# Patient Record
Sex: FEMALE | Race: BLACK OR AFRICAN AMERICAN | Employment: FULL TIME | URBAN - METROPOLITAN AREA
[De-identification: names, ages, dates, MRNs, and addresses within clinical notes are randomized per-mention and may not be internally consistent; named-entity substitution may affect disease eponyms.]

---

## 2023-07-14 LAB
ALANINE AMINOTRANSFERASE (SGPT) (U/L) IN SER/PLAS: 15 U/L (ref 7–45)
ALBUMIN (G/DL) IN SER/PLAS: 4.3 G/DL (ref 3.4–5)
ALKALINE PHOSPHATASE (U/L) IN SER/PLAS: 73 U/L (ref 33–136)
ANION GAP IN SER/PLAS: 12 MMOL/L (ref 10–20)
ASPARTATE AMINOTRANSFERASE (SGOT) (U/L) IN SER/PLAS: 20 U/L (ref 9–39)
BILIRUBIN TOTAL (MG/DL) IN SER/PLAS: 0.5 MG/DL (ref 0–1.2)
CALCIDIOL (25 OH VITAMIN D3) (NG/ML) IN SER/PLAS: 29 NG/ML
CALCIUM (MG/DL) IN SER/PLAS: 10 MG/DL (ref 8.6–10.6)
CARBON DIOXIDE, TOTAL (MMOL/L) IN SER/PLAS: 26 MMOL/L (ref 21–32)
CHLORIDE (MMOL/L) IN SER/PLAS: 106 MMOL/L (ref 98–107)
CHOLESTEROL (MG/DL) IN SER/PLAS: 320 MG/DL (ref 0–199)
CHOLESTEROL IN HDL (MG/DL) IN SER/PLAS: 95 MG/DL
CHOLESTEROL/HDL RATIO: 3.4
CREATININE (MG/DL) IN SER/PLAS: 1.01 MG/DL (ref 0.5–1.05)
ERYTHROCYTE DISTRIBUTION WIDTH (RATIO) BY AUTOMATED COUNT: 13.7 % (ref 11.5–14.5)
ERYTHROCYTE MEAN CORPUSCULAR HEMOGLOBIN CONCENTRATION (G/DL) BY AUTOMATED: 31.1 G/DL (ref 32–36)
ERYTHROCYTE MEAN CORPUSCULAR VOLUME (FL) BY AUTOMATED COUNT: 88 FL (ref 80–100)
ERYTHROCYTES (10*6/UL) IN BLOOD BY AUTOMATED COUNT: 5.09 X10E12/L (ref 4–5.2)
GFR FEMALE: 62 ML/MIN/1.73M2
GLUCOSE (MG/DL) IN SER/PLAS: 82 MG/DL (ref 74–99)
HEMATOCRIT (%) IN BLOOD BY AUTOMATED COUNT: 45 % (ref 36–46)
HEMOGLOBIN (G/DL) IN BLOOD: 14 G/DL (ref 12–16)
LDL: 212 MG/DL (ref 0–99)
LEUKOCYTES (10*3/UL) IN BLOOD BY AUTOMATED COUNT: 2.4 X10E9/L (ref 4.4–11.3)
NRBC (PER 100 WBCS) BY AUTOMATED COUNT: 0 /100 WBC (ref 0–0)
PLATELETS (10*3/UL) IN BLOOD AUTOMATED COUNT: 257 X10E9/L (ref 150–450)
POTASSIUM (MMOL/L) IN SER/PLAS: 4.3 MMOL/L (ref 3.5–5.3)
PROTEIN TOTAL: 6.8 G/DL (ref 6.4–8.2)
SODIUM (MMOL/L) IN SER/PLAS: 140 MMOL/L (ref 136–145)
THYROTROPIN (MIU/L) IN SER/PLAS BY DETECTION LIMIT <= 0.05 MIU/L: 0.91 MIU/L (ref 0.44–3.98)
TRIGLYCERIDE (MG/DL) IN SER/PLAS: 65 MG/DL (ref 0–149)
UREA NITROGEN (MG/DL) IN SER/PLAS: 19 MG/DL (ref 6–23)
VLDL: 13 MG/DL (ref 0–40)

## 2023-10-11 ENCOUNTER — TELEPHONE (OUTPATIENT)
Dept: PHYSICAL MEDICINE AND REHAB | Facility: CLINIC | Age: 63
End: 2023-10-11
Payer: COMMERCIAL

## 2023-10-12 NOTE — PROGRESS NOTES
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 Physical exam is reassuring for lack of neurological compromise. Symptoms and physical exam findings in this complex patient and her of unclear etiology at the moment, but most likely a result of sacroiliac joint dysfunction and reactive myofascial pain/tension, possibly stemming from right knee osteoarthritis. However, lumbar radiculopathy and spondylosis are on the differential, as well as referral from ovarian distention into the posterior right knee, given her history of cervical cancer in the past.    -Left ultrasound-guided SI joint injection and bilateral thoracic trigger point injections performed as above.  There were no complications and tolerated the procedures well.  She was provided with postprocedure instructions.  -Continue Robaxin up to 4 times daily as needed, continue meloxicam as needed  -Continue Topamax 50 mg twice a day  -Consider other medications in the future including Lyrica, Cymbalta, nortriptyline, Medrol Dosepak  -Consider knee injection  in the future if needed  -Continue daily home exercises  -Follow-up 4-6 weeks    The patient expressed understanding and agreement with the assessment and plan. Patient encouraged to contact us should they have any questions, concerns, or any changes in symptoms.     Thank you for allowing me to participate in the care of your patient.      ** Dictated with voice recognition software, please forgive any errors in grammar and/or spelling **      Chief Complaint    Follow up on back, bilateral shoulder and right knee pain      History of Present IllnessThis is a pleasant 63-year-old right-handed woman with past medical history significant for BPPV, HLD, cervical carcinoma in situ 2012 s/p hysterectomy,  who presents for follow-up chronic back, shoulder, and right knee pain.    She was last seen here on 6/5/2023, at which point I advised her to continue Robaxin and Topamax.    I advised her to continue daily home exercises.    She messaged me recently saying that her pain is flared up.  In the beginning of September, she started feeling some left lower back and buttock pain.  I advised her to wait and see if it would subside.  It started subsiding, but then on 9/23/2023, her heel got caught in her dress and she ended up falling and landing on her left side and this flared up all of her pain.  She called me again and I ordered some prednisone, this helped only temporarily before the pain all came back.  At this point, it is all in her left lower back, iliac crest, buttock, radiating to the left groin and left lateral thigh to the knee.  No pain beyond the knee.  Pain matches and SI joint distribution.  Worse with walking and standing, no pain with sitting.    She would like to try an ultrasound-guided SI joint injection today.  She would also like repeat bilateral thoracic trigger point injections    She rates her pain as a 8/10, previously 2/10.    Otherwise, there've been no changes to her medications or past medical history since the last visit.    ___________________  4/1/2020: Continue home exercises, take meloxicam as needed, Voltaren gel as needed, follow up as needed  12/7/2020: Thoracolumbar trigger point injections, resume exercises, try diclofenac instead of meloxicam and ibuprofen, try Robaxin instead of Skelaxin  1/11/2021: Start gabapentin, continue home exercises, consider MRI, follow-up in 2 months  5/10/2021: Continue exercises, continue diclofenac and Robaxin as needed, consider other medications in the future, consider injections, avoid sitting crosslegged  8/23/2021: Continue exercises, medications as needed, consider MRIs and injections in the future  1/12/2022: Bilateral upper back and lower  "back trigger point injections, try wedge to sleep on at night, diclofenac ordered, resume exercises  2/21/2022: Lumbar and thoracic MRI ordered, continue exercises  5/10/2022: Thoracic and lumbar MRI reviewed,, thoracic trigger point injections, referral for lumbar MJ versus facet block/RFA, continue exercises, meloxicam instead of diclofenac, continue Robaxin.  6/14/2022: Core exercises, continue medications as needed, consider repeat injections  8/29/2022: Continue medications as needed, daily home exercises focusing on active strengthening, consider repeat injections with Dr. Artis if needed  2/27/2023: Core exercises provided, continue meloxicam and Robaxin as needed, consider other medications, consider ultrasound-guided SI joint injections  6/5/2023: continue core exercises, continue meloxicam and Robaxin as needed, topamax 50 mg daily consider other medications, consider ultrasound-guided SI joint injections  10/13/2023: Left ultrasound-guided SI joint injection, bilateral thoracic trigger point injections, continue medications and exercises.  _______________  As a reminder:    TIMELINE OF COMPLAINT(S):  The patient had back surgery back in 2002, she is not sure what level, may be something involving L4, and she is not sure if there was fusion. This was for \"sciatica or arthritis\", and at the time she had tingling and weakness during down her right foot. Surgery helped, and she had no issues until this past June 2019. There are x-rays in June 2018, but the patient is adamant that it only started in 2019. She said that she was doing well from 2002 until 2019. At some point in the middle, about 6 years ago, she was told that she has scoliosis, but she had no main issues.     When she started feeling the pain come back in June 2019, she had some x-rays done and was told that her \"bones were deteriorating\". Then a few months later in August, she fell when she missed a step as she ran down the stairs, and landed " "on the top of her right hand, and since then it cramps up with certain movements, like when she wipes herself. The back of her knee also started hurting in June 2019, and is worse with prolonged bending and then getting up to stand.     Also in the past month, she felt the tingling \"like warm ants tingling\" going from her lower back to her right shoulder blade. This past weekend she felt pain in both posterior shoulders and neck. The left side is fine now, but she still has a dull ache in the right posterior shoulder and neck area.    The low back is the most bothersome along with the right knee. She is also had tingling in the right foot since June .    Pain:  LOCATION- low back LSJ, L=R but alternates, L groin, bilateral posterior shoulder pain , post neck and right knee and thigh posteriorly  RADIATION- Down R upper arm to elbow. no pain below knee or elbow.  ASSOCIATED WITH- no falls  NUMBNESS/TINGLING- Yes, right foot  WEAKNESS- No  CONSTANT or INTERMITTENT- Intermittent  SEVERITY/QUANTITY- 8, sitting here now 6/10 in shoulder, but none in back  QUALITY- Dull, achy  EXACERBATED BY- Nothing  BETTER WITH- Cold or warm compress  TRIED- Naproxen doesn’t help, flexeril doesn’t help just sleepy. , Tylenol Arthritis takes edge off, meloxicam hasn’t taken in while, medrol dose packs for respiratory issues and also helped with pain. No other meds for this    PHYSICAL THERAPY: Yes, last time was in 2002, does some HEP, no TENS  CHIROPRACTIC MANIPULATION: No  ACUPUNCTURE TREATMENTS: No  DEEP TISSUE MASSAGE THERAPY: Yes, helps temporarily  OSTEOPATHIC MANIPULATION THERAPY: No  INJECTIONS: No  EMG/NCS: No    IMAGING: Yes, LS xray 8/2018 L5-S1 disc space narrowing, no knee or neck imaging in our system.    FUNCTIONAL HISTORY: The patient is independent in all ADLs, mobility, and driving. The patient does not use any assistive device.    SOCIAL HISTORY:  L[facility] in: [facility]  Lives with: Spouse  Occupation: " Clergy  Smoking:No  Alcohol: No  Drugs: No    ____________  ROS: The patient denies any bowel or bladder incontinence/accidents, night sweats, fevers, chills, recent significant weight loss. A 14 point review of systems was reviewed with the patient and is as above and otherwise negative. ROS questionnaire positive for muscle pain/tightness, spasms, joint pain, back pain      Physical Exam  GEN - Alert, well-developed, well-nourished, no apparent distress  PSYCH - Cooperative, appropriate mood and affect  HEENT - NC/AT  RESP - Non-labored respirations, equal expansion  CV - well-perfused, No cyanosis or edema in extremities.   ABD- soft, ND  SKIN - No rash.    Positive Yolette and Gaenslen test on the left, significant tenderness over the left SI joint and surrounding paraspinal and gluteal musculature.  Hip range of motion itself was okay and did not reproduce hip pain.    Previous BACK/SPINE - Symmetric posture, no erythema, edema, or swelling. Full lumbar range of motion without provocation of pain symptoms. There was tenderness over both SI joints significantly, as well as bilateral gluteal muscles. No significant tenderness over the greater trochanteric bursa areas. Mild tenderness over the lumbar paraspinal muscles.. Straight leg raise negative bilaterally. Sitting slump test negative bilaterally. Femoral stretch test negative bilaterally.     Previous HIPS/PELVIS - Symmetric in standing and lying Passive hip flexion, internal rotation, and external rotation within functional normal limits bilaterally without provocation of pain symptoms. No tenderness over iliopsoas tendon.positive FABERs on the left, positive Gaenslen's on the right. Negative hip clicking. Negative hip impingement test. Negative log roll. Negative resisted active SLR. No pain with deep hip flexion.patient was not able to do single leg sit to stand on either side    Previous NEURO - UE strength 5/5 - including shoulder abduction, biceps,  triceps, wrist extensors, finger flexors, interossei, and    LE strength 5/5 - including hip flexors, knee flexors, knee extensors, ankle dorsiflexors, ankle plantar flexors, and EHL   Sensation - intact to light touch in bilateral lower and upper extremities.   Reflexes - diminished but equal biceps, brachioradialis, triceps, patellar and Achilles reflexes bilaterally No Clonus, No Babinski, Renee's negative bilaterally  GAIT - Normal base, normal stride length, non-antalgic. Able to toe walk and heel walk without difficulty.     ____________________________________  Lidocaine 1%- 4 cc's used, 0 cc's wasted  200mg/20mL (10mg/mL)  NDC 6326-6448-10  LOT [id number]  EXP 02/01/2025  Manuf: [facility]      Kenalog 40- 1 cc's used, 0 cc's wasted  NDC 9948-1044-23  LOT [id number]  EXP 09/2024  Manuf: [employer]   _______________________        Procedure:  Left SI joint corticosteroid injection:  Please see saved images in the ultrasound tablet    Informed consent obtained. Site confirmed by patient. Time out taken. Ultrasound transmission gel applied and ultrasound images obtained of pre-injection sites.    Longitudinal and transverse views of left SI joints    Description of the Procedure: The procedure, risks and alternative treatments were discussed with the patient. After written informed consent was obtained, the area of most tenderness was identified over the left SI joint while the patient was supine on top of the exam table. The area was marked. Site prepped sterile with chlorhexidine scrub. Ethyl chloride spray used to anesthetize the skin. Using a 27 gauge 3.5 inch spinal needle, after negative aspiration, the area was injected with a total of 4 cc of 1% lidocaine and 1 cc of Kenalog 40 mg/mL was injected under direct US-guidance using in plane transverse approach. Location of medication confirmed by ultrasound in correct site. The patient tolerated the procedure well with no immediate complications or  bleeding. No complications occurred.     Plan:   1. The patient was instructed in post-procedural care.  2. The patient was asked to apply moist heat and or ice for the next 24 hours and to perform daily gentle stretching exercises.     _______________  Lidocaine 1%- 6 cc's used, 0 cc's wasted  200mg/20mL (10mg/mL)  NDC 6076-4397-53  LOT [id number]  EXP 02/01/2025  Manuf: [facility]     Thoracic trigger point injections:    Description of the Procedure: The procedure, risks and alternative treatments were discussed with the patient. After written informed consent was obtained, the trigger points in the thoracic paraspinal muscles were palpated and marked. The skin was prepped three times with alcohol. Using a 27 gauge 1.5 inch needle, after negative aspiration, the trigger points in each muscle were injected with a total of 6 cc's of 1% lidocaine, spread equally into 6 sites. Twitch responses were observed in the musculature. The patient tolerated the procedure well with no immediate complications or bleeding.      Plan:   1. The patient was instructed in post-procedural care.  2. The patient was asked to apply moist heat and or ice for the next 24 hours and to perform daily gentle stretching exercises.   Physical Exam  Constitutional:

## 2023-10-12 NOTE — PATIENT INSTRUCTIONS
-Ice on and off for the next 24 hours if injection sites are sore. Do gentle range of motion exercises in each area that was injected. Try to do them every hour for about half a minute or so, in every direction that the affected part goes. No pool, bath, or hot tub today. Avoid heavy lifting for the next 2 days.    -Continue Robaxin up to 4 times daily as needed, continue meloxicam as needed  -Continue Topamax 50 mg twice a day  -Consider other medications in the future including Lyrica, Cymbalta, nortriptyline, Medrol Dosepak  -Consider knee injection  in the future if needed  -Continue daily home exercises  -Follow-up 4-6 weeks

## 2023-10-13 ENCOUNTER — OFFICE VISIT (OUTPATIENT)
Dept: PHYSICAL MEDICINE AND REHAB | Facility: CLINIC | Age: 63
End: 2023-10-13
Payer: COMMERCIAL

## 2023-10-13 VITALS
OXYGEN SATURATION: 100 % | SYSTOLIC BLOOD PRESSURE: 146 MMHG | TEMPERATURE: 97.2 F | BODY MASS INDEX: 22.5 KG/M2 | DIASTOLIC BLOOD PRESSURE: 89 MMHG | HEART RATE: 62 BPM | WEIGHT: 140 LBS | HEIGHT: 66 IN

## 2023-10-13 DIAGNOSIS — G89.29 CHRONIC LOW BACK PAIN, UNSPECIFIED BACK PAIN LATERALITY, UNSPECIFIED WHETHER SCIATICA PRESENT: ICD-10-CM

## 2023-10-13 DIAGNOSIS — M54.16 LUMBAR RADICULOPATHY: ICD-10-CM

## 2023-10-13 DIAGNOSIS — M46.1 SACROILIITIS (CMS-HCC): ICD-10-CM

## 2023-10-13 DIAGNOSIS — M54.50 CHRONIC LOW BACK PAIN, UNSPECIFIED BACK PAIN LATERALITY, UNSPECIFIED WHETHER SCIATICA PRESENT: ICD-10-CM

## 2023-10-13 DIAGNOSIS — G89.29 CHRONIC PAIN OF RIGHT KNEE: ICD-10-CM

## 2023-10-13 DIAGNOSIS — M25.561 CHRONIC PAIN OF RIGHT KNEE: ICD-10-CM

## 2023-10-13 DIAGNOSIS — M54.6 CHRONIC THORACIC BACK PAIN, UNSPECIFIED BACK PAIN LATERALITY: ICD-10-CM

## 2023-10-13 DIAGNOSIS — M79.18 MYOFASCIAL PAIN: ICD-10-CM

## 2023-10-13 DIAGNOSIS — M53.3 SACROILIAC JOINT DYSFUNCTION OF BOTH SIDES: ICD-10-CM

## 2023-10-13 DIAGNOSIS — G89.29 CHRONIC THORACIC BACK PAIN, UNSPECIFIED BACK PAIN LATERALITY: ICD-10-CM

## 2023-10-13 PROCEDURE — 1036F TOBACCO NON-USER: CPT | Performed by: PHYSICAL MEDICINE & REHABILITATION

## 2023-10-13 PROCEDURE — 20553 NJX 1/MLT TRIGGER POINTS 3/>: CPT | Performed by: PHYSICAL MEDICINE & REHABILITATION

## 2023-10-13 PROCEDURE — 99213 OFFICE O/P EST LOW 20 MIN: CPT | Performed by: PHYSICAL MEDICINE & REHABILITATION

## 2023-10-13 PROCEDURE — 20611 DRAIN/INJ JOINT/BURSA W/US: CPT | Performed by: PHYSICAL MEDICINE & REHABILITATION

## 2023-10-13 RX ORDER — TOPIRAMATE 50 MG/1
50 TABLET, FILM COATED ORAL 2 TIMES DAILY
COMMUNITY
End: 2023-10-16 | Stop reason: SDUPTHER

## 2023-10-13 RX ORDER — MELOXICAM 15 MG/1
15 TABLET ORAL AS NEEDED
COMMUNITY
End: 2024-02-13 | Stop reason: SDUPTHER

## 2023-10-13 RX ADMIN — TRIAMCINOLONE ACETONIDE 40 MG: 40 INJECTION, SUSPENSION INTRA-ARTICULAR; INTRAMUSCULAR at 15:01

## 2023-10-13 ASSESSMENT — PAIN SCALES - GENERAL: PAINLEVEL: 8

## 2023-10-16 DIAGNOSIS — Z12.11 SPECIAL SCREENING FOR MALIGNANT NEOPLASMS, COLON: ICD-10-CM

## 2023-10-16 DIAGNOSIS — M54.16 LUMBAR RADICULOPATHY: Primary | ICD-10-CM

## 2023-10-16 DIAGNOSIS — M54.16 LUMBAR RADICULOPATHY: ICD-10-CM

## 2023-10-16 RX ORDER — POLYETHYLENE GLYCOL 3350, SODIUM SULFATE ANHYDROUS, SODIUM BICARBONATE, SODIUM CHLORIDE, POTASSIUM CHLORIDE 236; 22.74; 6.74; 5.86; 2.97 G/4L; G/4L; G/4L; G/4L; G/4L
POWDER, FOR SOLUTION ORAL
Qty: 4000 ML | Refills: 0 | Status: SHIPPED | OUTPATIENT
Start: 2023-10-16 | End: 2023-11-15 | Stop reason: ALTCHOICE

## 2023-10-16 RX ORDER — TOPIRAMATE 50 MG/1
50 TABLET, FILM COATED ORAL 2 TIMES DAILY
Qty: 60 TABLET | Refills: 5 | Status: SHIPPED | OUTPATIENT
Start: 2023-10-16 | End: 2023-10-16

## 2023-10-16 RX ORDER — TOPIRAMATE 50 MG/1
50 TABLET, FILM COATED ORAL 2 TIMES DAILY
Qty: 180 TABLET | Refills: 1 | Status: SHIPPED | OUTPATIENT
Start: 2023-10-16 | End: 2024-02-13 | Stop reason: SDUPTHER

## 2023-11-01 PROBLEM — N64.4 BREAST TENDERNESS: Status: ACTIVE | Noted: 2023-11-01

## 2023-11-01 PROBLEM — M53.3 SACROILIAC JOINT DYSFUNCTION OF BOTH SIDES: Status: ACTIVE | Noted: 2023-11-01

## 2023-11-01 PROBLEM — R44.8 FACIAL PRESSURE: Status: ACTIVE | Noted: 2023-11-01

## 2023-11-01 PROBLEM — Z85.41 HISTORY OF CERVICAL CANCER: Status: ACTIVE | Noted: 2023-11-01

## 2023-11-01 PROBLEM — J32.8 OTHER CHRONIC SINUSITIS: Status: ACTIVE | Noted: 2022-03-23

## 2023-11-01 PROBLEM — M17.11 PRIMARY OSTEOARTHRITIS OF RIGHT KNEE: Status: ACTIVE | Noted: 2023-11-01

## 2023-11-01 PROBLEM — B35.1 ONYCHOMYCOSIS: Status: ACTIVE | Noted: 2023-11-01

## 2023-11-01 PROBLEM — J34.89 SINUS PRESSURE: Status: ACTIVE | Noted: 2023-11-01

## 2023-11-01 PROBLEM — H52.203 ASTIGMATISM OF BOTH EYES: Status: ACTIVE | Noted: 2023-11-01

## 2023-11-01 PROBLEM — H69.93 EUSTACHIAN TUBE DYSFUNCTION, BILATERAL: Status: ACTIVE | Noted: 2023-11-01

## 2023-11-01 PROBLEM — S63.599A TFCC (TRIANGULAR FIBROCARTILAGE COMPLEX) TEAR: Status: ACTIVE | Noted: 2023-11-01

## 2023-11-01 PROBLEM — R35.0 INCREASED FREQUENCY OF URINATION: Status: ACTIVE | Noted: 2023-02-14

## 2023-11-01 PROBLEM — M79.602 CHRONIC PAIN OF LEFT UPPER EXTREMITY: Status: ACTIVE | Noted: 2023-11-01

## 2023-11-01 PROBLEM — R20.8 DYSESTHESIA: Status: ACTIVE | Noted: 2023-11-01

## 2023-11-01 PROBLEM — R07.0 THROAT DISCOMFORT: Status: ACTIVE | Noted: 2023-11-01

## 2023-11-01 PROBLEM — Z86.2 HISTORY OF NEUTROPENIA: Status: ACTIVE | Noted: 2023-11-01

## 2023-11-01 PROBLEM — J32.2 CHRONIC ETHMOIDAL SINUSITIS: Status: ACTIVE | Noted: 2023-11-01

## 2023-11-01 PROBLEM — H52.4 HYPEROPIA WITH PRESBYOPIA OF BOTH EYES: Status: ACTIVE | Noted: 2023-11-01

## 2023-11-01 PROBLEM — U07.1 DISEASE DUE TO SEVERE ACUTE RESPIRATORY SYNDROME CORONAVIRUS 2 (SARS-COV-2): Status: ACTIVE | Noted: 2023-11-01

## 2023-11-01 PROBLEM — M62.89 PELVIC FLOOR DYSFUNCTION: Status: ACTIVE | Noted: 2023-07-28

## 2023-11-01 PROBLEM — R20.8 DYSESTHESIA OF FACE: Status: ACTIVE | Noted: 2023-11-01

## 2023-11-01 PROBLEM — R53.83 FEELING TIRED: Status: ACTIVE | Noted: 2023-11-01

## 2023-11-01 PROBLEM — R20.8: Status: ACTIVE | Noted: 2023-11-01

## 2023-11-01 PROBLEM — Z85.41 HISTORY OF MALIGNANT NEOPLASM OF CERVIX: Status: ACTIVE | Noted: 2023-11-01

## 2023-11-01 PROBLEM — J34.89 NASAL AND SINUS DISCHARGE: Status: ACTIVE | Noted: 2023-11-01

## 2023-11-01 PROBLEM — K21.9 GERD (GASTROESOPHAGEAL REFLUX DISEASE): Status: ACTIVE | Noted: 2023-11-01

## 2023-11-01 PROBLEM — J45.30 MILD PERSISTENT ASTHMA WITHOUT COMPLICATION (HHS-HCC): Status: ACTIVE | Noted: 2023-11-01

## 2023-11-01 PROBLEM — R53.83 TIRED: Status: ACTIVE | Noted: 2023-11-01

## 2023-11-01 PROBLEM — K62.5 RECTAL HEMORRHAGE: Status: ACTIVE | Noted: 2023-11-01

## 2023-11-01 PROBLEM — H43.811 PVD (POSTERIOR VITREOUS DETACHMENT), RIGHT EYE: Status: ACTIVE | Noted: 2023-11-01

## 2023-11-01 PROBLEM — K57.32 DIVERTICULITIS OF COLON: Status: ACTIVE | Noted: 2022-12-19

## 2023-11-01 PROBLEM — M46.1 INFLAMMATION OF SACROILIAC JOINT (CMS-HCC): Status: ACTIVE | Noted: 2023-11-01

## 2023-11-01 PROBLEM — G89.29 CHRONIC PAIN OF LEFT UPPER EXTREMITY: Status: ACTIVE | Noted: 2023-11-01

## 2023-11-01 PROBLEM — N95.2 ATROPHIC VAGINITIS: Status: ACTIVE | Noted: 2023-07-28

## 2023-11-01 PROBLEM — H52.03 HYPEROPIA WITH PRESBYOPIA OF BOTH EYES: Status: ACTIVE | Noted: 2023-11-01

## 2023-11-01 PROBLEM — R35.1 NOCTURIA: Status: ACTIVE | Noted: 2023-11-01

## 2023-11-01 PROBLEM — E78.2 MIXED HYPERLIPIDEMIA: Status: ACTIVE | Noted: 2023-11-01

## 2023-11-01 PROBLEM — M20.10 VALGUS DEFORMITY OF GREAT TOE: Status: ACTIVE | Noted: 2023-11-01

## 2023-11-01 PROBLEM — J34.3 HYPERTROPHY OF BOTH INFERIOR NASAL TURBINATES: Status: ACTIVE | Noted: 2023-11-01

## 2023-11-01 PROBLEM — H81.13 BENIGN PAROXYSMAL POSITIONAL VERTIGO DUE TO BILATERAL VESTIBULAR DISORDER: Status: ACTIVE | Noted: 2023-03-28

## 2023-11-01 PROBLEM — R39.15 URINARY URGENCY: Status: ACTIVE | Noted: 2023-11-01

## 2023-11-01 PROBLEM — K62.5 BLOOD PER RECTUM: Status: ACTIVE | Noted: 2023-11-01

## 2023-11-01 PROBLEM — H43.391 VITREOUS SYNERESIS OF RIGHT EYE: Status: ACTIVE | Noted: 2023-11-01

## 2023-11-01 PROBLEM — M54.16 LUMBAR RADICULOPATHY: Status: ACTIVE | Noted: 2023-11-01

## 2023-11-01 PROBLEM — J31.0 CHRONIC RHINITIS: Status: ACTIVE | Noted: 2023-11-01

## 2023-11-01 PROBLEM — H10.45 CHRONIC ALLERGIC CONJUNCTIVITIS: Status: ACTIVE | Noted: 2023-11-01

## 2023-11-01 PROBLEM — G89.29 CHRONIC PAIN OF RIGHT KNEE: Status: ACTIVE | Noted: 2023-11-01

## 2023-11-01 PROBLEM — M25.561 CHRONIC PAIN OF RIGHT KNEE: Status: ACTIVE | Noted: 2023-11-01

## 2023-11-01 PROBLEM — M54.42 LOW BACK PAIN WITH LEFT-SIDED SCIATICA: Status: ACTIVE | Noted: 2022-03-17

## 2023-11-01 PROBLEM — M46.1 SACROILIITIS (CMS-HCC): Status: ACTIVE | Noted: 2023-11-01

## 2023-11-01 PROBLEM — E78.5 HYPERLIPIDEMIA: Status: ACTIVE | Noted: 2023-11-01

## 2023-11-01 PROBLEM — N81.89 PELVIC FLOOR WEAKNESS: Status: ACTIVE | Noted: 2023-11-01

## 2023-11-01 RX ORDER — ESTRADIOL 10 UG/1
1 INSERT VAGINAL 2 TIMES WEEKLY
COMMUNITY
Start: 2022-09-16 | End: 2023-11-15 | Stop reason: ALTCHOICE

## 2023-11-01 RX ORDER — NAPROXEN 500 MG/1
1 TABLET ORAL
COMMUNITY
Start: 2019-05-30 | End: 2023-11-15 | Stop reason: ALTCHOICE

## 2023-11-01 RX ORDER — OXYCODONE AND ACETAMINOPHEN 5; 325 MG/1; MG/1
1-2 TABLET ORAL EVERY 6 HOURS PRN
COMMUNITY
Start: 2010-11-23 | End: 2023-11-15 | Stop reason: ALTCHOICE

## 2023-11-01 RX ORDER — ALBUTEROL SULFATE 90 UG/1
AEROSOL, METERED RESPIRATORY (INHALATION) EVERY 4 HOURS PRN
COMMUNITY
Start: 2015-09-15 | End: 2023-11-15 | Stop reason: SDUPTHER

## 2023-11-01 RX ORDER — MIRABEGRON 50 MG/1
1 TABLET, FILM COATED, EXTENDED RELEASE ORAL DAILY
COMMUNITY
Start: 2022-09-16 | End: 2023-11-15 | Stop reason: ALTCHOICE

## 2023-11-01 RX ORDER — FLUTICASONE PROPIONATE 50 MCG
2 SPRAY, SUSPENSION (ML) NASAL DAILY
COMMUNITY
Start: 2015-11-20 | End: 2023-11-15 | Stop reason: ALTCHOICE

## 2023-11-01 RX ORDER — PREDNISONE 20 MG/1
TABLET ORAL
COMMUNITY
Start: 2023-03-01 | End: 2023-11-15 | Stop reason: ALTCHOICE

## 2023-11-01 RX ORDER — TRIAMCINOLONE ACETONIDE 55 UG/1
SPRAY, METERED NASAL
COMMUNITY
Start: 2022-03-09 | End: 2023-11-15 | Stop reason: ALTCHOICE

## 2023-11-01 RX ORDER — CYCLOBENZAPRINE HCL 10 MG
1 TABLET ORAL EVERY 8 HOURS PRN
COMMUNITY
Start: 2010-11-23 | End: 2023-11-15 | Stop reason: ALTCHOICE

## 2023-11-01 RX ORDER — IBUPROFEN 800 MG/1
1 TABLET ORAL EVERY 8 HOURS
COMMUNITY
Start: 2010-11-23 | End: 2023-11-15 | Stop reason: ALTCHOICE

## 2023-11-01 RX ORDER — DEXTROMETHORPHAN HYDROBROMIDE, GUAIFENESIN 5; 100 MG/5ML; MG/5ML
1 LIQUID ORAL
COMMUNITY
Start: 2018-08-18

## 2023-11-01 RX ORDER — VIT C/ZN GLUC/HERBAL NO.325 90 MG-15MG
1 LOZENGE MUCOUS MEMBRANE DAILY
COMMUNITY
Start: 2021-09-02

## 2023-11-01 RX ORDER — LORATADINE 10 MG/1
1 TABLET ORAL DAILY
COMMUNITY

## 2023-11-01 RX ORDER — CHLORPHENIRAMINE MALEATE 4 MG
1 TABLET ORAL
COMMUNITY
Start: 2017-05-11

## 2023-11-01 RX ORDER — ACETAMINOPHEN 325 MG/1
2 TABLET ORAL EVERY 6 HOURS PRN
COMMUNITY
Start: 2018-10-30

## 2023-11-01 RX ORDER — SULFAMETHOXAZOLE AND TRIMETHOPRIM 400; 80 MG/1; MG/1
1 TABLET ORAL 2 TIMES DAILY
COMMUNITY
Start: 2022-03-08 | End: 2023-11-15 | Stop reason: ALTCHOICE

## 2023-11-01 RX ORDER — AZELASTINE HYDROCHLORIDE, FLUTICASONE PROPIONATE 137; 50 UG/1; UG/1
1 SPRAY, METERED NASAL 2 TIMES DAILY
COMMUNITY
Start: 2021-10-19 | End: 2023-11-15 | Stop reason: ALTCHOICE

## 2023-11-01 RX ORDER — OMEPRAZOLE 20 MG/1
1 CAPSULE, DELAYED RELEASE ORAL
COMMUNITY
Start: 2022-01-14

## 2023-11-01 RX ORDER — METHOCARBAMOL 500 MG/1
1-2 TABLET, FILM COATED ORAL 4 TIMES DAILY
COMMUNITY
Start: 2020-12-07

## 2023-11-01 RX ORDER — TOPIRAMATE 25 MG/1
1 TABLET ORAL 2 TIMES DAILY
COMMUNITY
Start: 2022-05-17 | End: 2023-11-15 | Stop reason: ALTCHOICE

## 2023-11-01 RX ORDER — FLUNISOLIDE 0.25 MG/ML
2 SOLUTION NASAL 2 TIMES DAILY
COMMUNITY
Start: 2017-05-11

## 2023-11-01 RX ORDER — BUDESONIDE AND FORMOTEROL FUMARATE DIHYDRATE 80; 4.5 UG/1; UG/1
AEROSOL RESPIRATORY (INHALATION)
COMMUNITY
Start: 2021-11-29 | End: 2023-11-15 | Stop reason: ALTCHOICE

## 2023-11-01 RX ORDER — PSYLLIUM HUSK 0.4 G
CAPSULE ORAL
COMMUNITY
Start: 2021-09-02

## 2023-11-01 RX ORDER — CICLOPIROX 80 MG/ML
SOLUTION TOPICAL
COMMUNITY
Start: 2023-03-29 | End: 2023-11-15 | Stop reason: ALTCHOICE

## 2023-11-01 RX ORDER — DICLOFENAC SODIUM 50 MG/1
TABLET, DELAYED RELEASE ORAL
COMMUNITY
Start: 2020-02-19 | End: 2023-11-15 | Stop reason: ALTCHOICE

## 2023-11-01 RX ORDER — ALBUTEROL SULFATE 90 UG/1
2 AEROSOL, METERED RESPIRATORY (INHALATION) EVERY 6 HOURS PRN
COMMUNITY
Start: 2015-11-19

## 2023-11-01 RX ORDER — ACETAMINOPHEN 500 MG
1 TABLET ORAL DAILY
COMMUNITY
Start: 2021-09-02 | End: 2023-11-15 | Stop reason: SDUPTHER

## 2023-11-01 RX ORDER — MECLIZINE HCL 12.5 MG 12.5 MG/1
1 TABLET ORAL 3 TIMES DAILY PRN
COMMUNITY
Start: 2018-04-06

## 2023-11-02 ENCOUNTER — OFFICE VISIT (OUTPATIENT)
Dept: GASTROENTEROLOGY | Facility: EXTERNAL LOCATION | Age: 63
End: 2023-11-02
Payer: COMMERCIAL

## 2023-11-02 DIAGNOSIS — Z12.11 ENCOUNTER FOR SCREENING FOR MALIGNANT NEOPLASM OF COLON: ICD-10-CM

## 2023-11-02 DIAGNOSIS — K57.30 DIVERTICULOSIS OF LARGE INTESTINE WITHOUT DIVERTICULITIS: ICD-10-CM

## 2023-11-02 DIAGNOSIS — Z86.010 PERSONAL HISTORY OF COLONIC POLYPS: Primary | ICD-10-CM

## 2023-11-02 PROCEDURE — 1036F TOBACCO NON-USER: CPT | Performed by: INTERNAL MEDICINE

## 2023-11-02 PROCEDURE — G0105 COLORECTAL SCRN; HI RISK IND: HCPCS | Performed by: INTERNAL MEDICINE

## 2023-11-08 ENCOUNTER — ANCILLARY PROCEDURE (OUTPATIENT)
Dept: RADIOLOGY | Facility: CLINIC | Age: 63
End: 2023-11-08
Payer: COMMERCIAL

## 2023-11-08 DIAGNOSIS — J34.9: ICD-10-CM

## 2023-11-08 PROCEDURE — 70220 X-RAY EXAM OF SINUSES: CPT | Mod: FY,FR

## 2023-11-08 PROCEDURE — 70220 X-RAY EXAM OF SINUSES: CPT | Mod: FOREIGN READ | Performed by: RADIOLOGY

## 2023-11-15 ENCOUNTER — OFFICE VISIT (OUTPATIENT)
Dept: OTOLARYNGOLOGY | Facility: CLINIC | Age: 63
End: 2023-11-15
Payer: COMMERCIAL

## 2023-11-15 VITALS — BODY MASS INDEX: 21.86 KG/M2 | WEIGHT: 136 LBS | HEIGHT: 66 IN

## 2023-11-15 DIAGNOSIS — J34.9: ICD-10-CM

## 2023-11-15 DIAGNOSIS — J34.89 NASAL VESTIBULITIS: Primary | ICD-10-CM

## 2023-11-15 PROCEDURE — 99213 OFFICE O/P EST LOW 20 MIN: CPT | Performed by: OTOLARYNGOLOGY

## 2023-11-15 PROCEDURE — 1036F TOBACCO NON-USER: CPT | Performed by: OTOLARYNGOLOGY

## 2023-11-15 RX ORDER — MUPIROCIN CALCIUM 20 MG/G
CREAM TOPICAL 2 TIMES DAILY
Qty: 15 G | Refills: 0 | Status: SHIPPED | OUTPATIENT
Start: 2023-11-15 | End: 2023-11-29

## 2023-11-15 ASSESSMENT — PATIENT HEALTH QUESTIONNAIRE - PHQ9
1. LITTLE INTEREST OR PLEASURE IN DOING THINGS: NOT AT ALL
2. FEELING DOWN, DEPRESSED OR HOPELESS: NOT AT ALL
SUM OF ALL RESPONSES TO PHQ9 QUESTIONS 1 AND 2: 0

## 2023-11-15 NOTE — PROGRESS NOTES
"Chief Complaint:  Nasal discomfort    Referring Provider: Kim Snowden MD    History of Present Illness:    Lilli Aldridge is a 63 y.o. female, presenting for evaluation of nasal discomfort.  She is a patient of Samreen Tony who has been managing her for allergic rhinitis nasal obstruction and other associated symptoms.  Per events report she had been doing quite well for months but then recently went for colonoscopy.  She states that when the nasal cannula was inserted into her nose she felt like there is something was not right and that there was some discomfort.  Following her colonoscopy she has had intermittent tenderness and discomfort of the external skeleton of her nose as well as the sensation of \"being too open\".  She saw an urgent care who performed sinus x-ray which was unremarkable.  She was prescribed Bactrim for presumed sinus infection.  She was asked to stop all of her nasal sprays for some reason as well.  No other pertinent symptoms.  She has not had any epistaxis.  There is been no mucopurulence.  No nasal discharge whatsoever.    Review of Systems:    Review of symptoms was negative except for those stated including Cardiopulmonary, Genitourinary, Gastrointestinal, Psychological, Sleep pattern, Endocrine, Eyes, Neurologic, Musculoskeletal, Skin, Hematologic/Lymphatic and Allergic/Immunologic.     Medical History:    I have reviewed the patient's updated past medical history, surgical history, family history, social history, as well as current medications and allergies as of 11/15/2023. Changes to these items have been updated and marked as reviewed in the electronic medical record.    Physical Exam:    Vitals:  height is 1.676 m (5' 6\") and weight is 61.7 kg (136 lb).   General: Patient doing well overall and is in no apparent distress.  Psych: Pleasant affect, and answers questions appropriately.  Head & Face: Symmetric facial movements  Eyes: Pupils equal, round, reactive.  " Extraocular movements intact without gaze restrictions or nystagmus. No epiphora.  Ears:  External auditory canals are normal.  Tympanic membranes are clear.  No middle ear effusion is seen.  All middle ear landmarks are normal.  Nose: Anterior rhinoscopy revealed normal sinonasal mucosa. More posterior areas of the nasal cavity could not be completely examined.  Oral Cavity/Oropharynx:  Without lesions or masses to visual exam.  Neck: Supple without lymphadenopathy.  Lungs: Non-labored, and without evidence of stridor.  Cardiac: Pulses are strong, well-perfused.  Extremities: Without gross evidence of clubbing, cyanosis, or edema.  Neuro: Cranial nerves II-XII grossly intact; Intact facial movements.    Assessment:     Lilli Aldridge is a 63 y.o. female with nasal discomfort following a recent procedure    Plan:      I advised her exam was normal today.  There is no evidence of any injury or mucosal abnormality.  Her nasal airway actually looks surprisingly patent given her previous history of rhinitis.  Of asked her to trial mupirocin ointment twice daily to see if it improves her discomfort since she may have had a vestibulitis which cleared with Bactrim prior to her presentation today.  She will plan to follow-up with us as needed.      Sander Contreras MD, M.Eng.   of Otolaryngology - Head & Neck Surgery  Division of Rhinology and Endoscopic Skull Base Surgery  [facility]

## 2023-11-21 ENCOUNTER — OFFICE VISIT (OUTPATIENT)
Dept: PHYSICAL MEDICINE AND REHAB | Facility: CLINIC | Age: 63
End: 2023-11-21
Payer: COMMERCIAL

## 2023-11-21 VITALS
SYSTOLIC BLOOD PRESSURE: 147 MMHG | HEIGHT: 66 IN | OXYGEN SATURATION: 100 % | DIASTOLIC BLOOD PRESSURE: 91 MMHG | WEIGHT: 141 LBS | BODY MASS INDEX: 22.66 KG/M2 | TEMPERATURE: 96.8 F | HEART RATE: 74 BPM

## 2023-11-21 DIAGNOSIS — M25.561 CHRONIC PAIN OF RIGHT KNEE: Primary | ICD-10-CM

## 2023-11-21 DIAGNOSIS — G89.29 CHRONIC PAIN OF LEFT UPPER EXTREMITY: ICD-10-CM

## 2023-11-21 DIAGNOSIS — M54.42 LOW BACK PAIN WITH LEFT-SIDED SCIATICA, UNSPECIFIED BACK PAIN LATERALITY, UNSPECIFIED CHRONICITY: ICD-10-CM

## 2023-11-21 DIAGNOSIS — M79.602 CHRONIC PAIN OF LEFT UPPER EXTREMITY: ICD-10-CM

## 2023-11-21 DIAGNOSIS — G89.29 CHRONIC PAIN OF RIGHT KNEE: Primary | ICD-10-CM

## 2023-11-21 DIAGNOSIS — M17.11 PRIMARY OSTEOARTHRITIS OF RIGHT KNEE: ICD-10-CM

## 2023-11-21 DIAGNOSIS — Z98.890 HISTORY OF LUMBAR SURGERY: ICD-10-CM

## 2023-11-21 DIAGNOSIS — M46.1 SACROILIITIS (CMS-HCC): ICD-10-CM

## 2023-11-21 DIAGNOSIS — M54.16 LUMBAR RADICULOPATHY: ICD-10-CM

## 2023-11-21 DIAGNOSIS — M53.3 SACROILIAC JOINT DYSFUNCTION OF BOTH SIDES: ICD-10-CM

## 2023-11-21 DIAGNOSIS — M41.9 SCOLIOSIS, UNSPECIFIED SCOLIOSIS TYPE, UNSPECIFIED SPINAL REGION: Chronic | ICD-10-CM

## 2023-11-21 DIAGNOSIS — M46.1 INFLAMMATION OF SACROILIAC JOINT (CMS-HCC): ICD-10-CM

## 2023-11-21 PROCEDURE — 99214 OFFICE O/P EST MOD 30 MIN: CPT | Performed by: PHYSICAL MEDICINE & REHABILITATION

## 2023-11-21 PROCEDURE — 1036F TOBACCO NON-USER: CPT | Performed by: PHYSICAL MEDICINE & REHABILITATION

## 2023-11-21 ASSESSMENT — PAIN SCALES - GENERAL: PAINLEVEL: 5

## 2023-11-21 NOTE — PATIENT INSTRUCTIONS
-Continue Robaxin up to 4 times daily as needed, continue meloxicam as needed  -Continue Topamax as it works for you  -Consider other medications in the future including Lyrica, Cymbalta, nortriptyline, Medrol Dosepak  -Consider knee injection  in the future if needed  -Continue daily home exercises  -SIJ brace ordered.  Do not wear it for more than 2 to 3 hours/day.  -Handicap placard script provided  -Consider referral for MJ's, cornerloc or transloc procedures, iFuse procedure.   -Follow-up next available US guided SIJ injection, next few weeks

## 2023-11-21 NOTE — PROGRESS NOTES
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 Physical exam is reassuring for lack of neurological compromise. Symptoms and physical exam findings in this complex patient and her of unclear etiology at the moment, but most likely a result of sacroiliac joint dysfunction and reactive myofascial pain/tension, possibly stemming from right knee osteoarthritis. However, lumbar radiculopathy and spondylosis are on the differential, as well as referral from ovarian distention into the posterior right knee, given her history of cervical cancer in the past.    -Continue Robaxin up to 4 times daily as needed, continue meloxicam as needed  -Continue Topamax as it works for you  -Consider other medications in the future including Lyrica, Cymbalta, nortriptyline, Medrol Dosepak  -Consider knee injection  in the future if needed  -Continue daily home exercises  -SIJ brace ordered.  Do not wear it for more than 2 to 3 hours/day.  -Handicap placard script provided  -Consider referral for MJ's, cornerloc or transloc procedures, iFuse procedure.   -Follow-up next available US guided SIJ injection, next few weeks    The patient expressed understanding and agreement with the assessment and plan. Patient encouraged to contact us should they have any questions, concerns, or any changes in symptoms.     Thank you for allowing me to participate in the care of your patient.      ** Dictated with voice recognition software, please forgive any errors in grammar and/or spelling **      Chief Complaint    Follow up on back, bilateral shoulder and right knee pain      History of Present Illness    This is a pleasant 63-year-old right-handed woman with past medical history significant for BPPV, HLD, cervical carcinoma in situ 2012 s/p  hysterectomy, who presents for follow-up chronic back, shoulder, and right knee pain.    She was last seen here on 10/13/2023, at which point I did a left SI joint injection and bilateral thoracic trigger point injections.  This did help significantly at first, thoracic pain still gone and trigger point injections helped significantly. SIJ injection helped 80% at first but only lasted about 4 weeks, started coming back in the past week.  She would like to be scheduled for another injection.    I advised her to continue Topamax 50 mg twice daily and Robaxin as needed.  She stopped topamax at first because she was feeling better but she re started it again recently because her pain has been getting worse.     I advised her to continue daily home exercises.    She rates her pain as a 5/10, previously 8/10.    Otherwise, there've been no changes to her medications or past medical history since the last visit.    ___________________  4/1/2020: Continue home exercises, take meloxicam as needed, Voltaren gel as needed, follow up as needed  12/7/2020: Thoracolumbar trigger point injections, resume exercises, try diclofenac instead of meloxicam and ibuprofen, try Robaxin instead of Skelaxin  1/11/2021: Start gabapentin, continue home exercises, consider MRI, follow-up in 2 months  5/10/2021: Continue exercises, continue diclofenac and Robaxin as needed, consider other medications in the future, consider injections, avoid sitting crosslegged  8/23/2021: Continue exercises, medications as needed, consider MRIs and injections in the future  1/12/2022: Bilateral upper back and lower back trigger point injections, try wedge to sleep on at night, diclofenac ordered, resume exercises  2/21/2022: Lumbar and thoracic MRI ordered, continue exercises  5/10/2022: Thoracic and lumbar MRI reviewed,, thoracic trigger point injections, referral for lumbar MJ versus facet block/RFA, continue exercises, meloxicam instead of diclofenac,  "continue Robaxin.  6/14/2022: Core exercises, continue medications as needed, consider repeat injections  8/29/2022: Continue medications as needed, daily home exercises focusing on active strengthening, consider repeat injections with Dr. Artis if needed  2/27/2023: Core exercises provided, continue meloxicam and Robaxin as needed, consider other medications, consider ultrasound-guided SI joint injections  6/5/2023: continue core exercises, continue meloxicam and Robaxin as needed, topamax 50 mg daily consider other medications, consider ultrasound-guided SI joint injections  10/13/2023: Left ultrasound-guided SI joint injection, bilateral thoracic trigger point injections, continue medications and exercises.  11/21/23: Continue medications, exercises, SI joint brace ordered, had a Licart provided, consider other SI joint options, follow-up for another injection in the meantime  _______________  As a reminder:    TIMELINE OF COMPLAINT(S):  The patient had back surgery back in 2002, she is not sure what level, may be something involving L4, and she is not sure if there was fusion. This was for \"sciatica or arthritis\", and at the time she had tingling and weakness during down her right foot. Surgery helped, and she had no issues until this past June 2019. There are x-rays in June 2018, but the patient is adamant that it only started in 2019. She said that she was doing well from 2002 until 2019. At some point in the middle, about 6 years ago, she was told that she has scoliosis, but she had no main issues.     When she started feeling the pain come back in June 2019, she had some x-rays done and was told that her \"bones were deteriorating\". Then a few months later in August, she fell when she missed a step as she ran down the stairs, and landed on the top of her right hand, and since then it cramps up with certain movements, like when she wipes herself. The back of her knee also started hurting in June 2019, and is " "worse with prolonged bending and then getting up to stand.     Also in the past month, she felt the tingling \"like warm ants tingling\" going from her lower back to her right shoulder blade. This past weekend she felt pain in both posterior shoulders and neck. The left side is fine now, but she still has a dull ache in the right posterior shoulder and neck area.    The low back is the most bothersome along with the right knee. She is also had tingling in the right foot since June .    Pain:  LOCATION- low back LSJ, L=R but alternates, L groin, bilateral posterior shoulder pain , post neck and right knee and thigh posteriorly  RADIATION- Down R upper arm to elbow. no pain below knee or elbow.  ASSOCIATED WITH- no falls  NUMBNESS/TINGLING- Yes, right foot  WEAKNESS- No  CONSTANT or INTERMITTENT- Intermittent  SEVERITY/QUANTITY- 8, sitting here now 6/10 in shoulder, but none in back  QUALITY- Dull, achy  EXACERBATED BY- Nothing  BETTER WITH- Cold or warm compress  TRIED- Naproxen doesn’t help, flexeril doesn’t help just sleepy. , Tylenol Arthritis takes edge off, meloxicam hasn’t taken in while, medrol dose packs for respiratory issues and also helped with pain. No other meds for this    PHYSICAL THERAPY: Yes, last time was in 2002, does some HEP, no TENS  CHIROPRACTIC MANIPULATION: No  ACUPUNCTURE TREATMENTS: No  DEEP TISSUE MASSAGE THERAPY: Yes, helps temporarily  OSTEOPATHIC MANIPULATION THERAPY: No  INJECTIONS: No  EMG/NCS: No    IMAGING: Yes, LS xray 8/2018 L5-S1 disc space narrowing, no knee or neck imaging in our system.    FUNCTIONAL HISTORY: The patient is independent in all ADLs, mobility, and driving. The patient does not use any assistive device.    SOCIAL HISTORY:  Lives in: [facility]  Lives with: Spouse  Occupation: Clergy  Smoking:No  Alcohol: No  Drugs: No    ____________  ROS: The patient denies any bowel or bladder incontinence/accidents, night sweats, fevers, chills, recent significant weight " loss. A 14 point review of systems was reviewed with the patient and is as above and otherwise negative. ROS questionnaire positive for muscle pain/tightness, spasms, joint pain, back pain      Physical Exam  GEN - Alert, well-developed, well-nourished, no apparent distress  PSYCH - Cooperative, appropriate mood and affect  HEENT - NC/AT  RESP - Non-labored respirations, equal expansion  CV - well-perfused, No cyanosis or edema in extremities.   ABD- soft, ND  SKIN - No rash.    Positive Yolette and Gaenslen test on the left, significant tenderness over the left SI joint and surrounding paraspinal and gluteal musculature.  Hip range of motion itself was okay and did not reproduce hip pain.    Previous BACK/SPINE - Symmetric posture, no erythema, edema, or swelling. Full lumbar range of motion without provocation of pain symptoms. There was tenderness over both SI joints significantly, as well as bilateral gluteal muscles. No significant tenderness over the greater trochanteric bursa areas. Mild tenderness over the lumbar paraspinal muscles.. Straight leg raise negative bilaterally. Sitting slump test negative bilaterally. Femoral stretch test negative bilaterally.     Previous HIPS/PELVIS - Symmetric in standing and lying Passive hip flexion, internal rotation, and external rotation within functional normal limits bilaterally without provocation of pain symptoms. No tenderness over iliopsoas tendon.positive FABERs on the left, positive Gaenslen's on the right. Negative hip clicking. Negative hip impingement test. Negative log roll. Negative resisted active SLR. No pain with deep hip flexion.patient was not able to do single leg sit to stand on either side    Previous NEURO - UE strength 5/5 - including shoulder abduction, biceps, triceps, wrist extensors, finger flexors, interossei, and    LE strength 5/5 - including hip flexors, knee flexors, knee extensors, ankle dorsiflexors, ankle plantar flexors, and EHL    Sensation - intact to light touch in bilateral lower and upper extremities.   Reflexes - diminished but equal biceps, brachioradialis, triceps, patellar and Achilles reflexes bilaterally No Clonus, No Babinski, Renee's negative bilaterally  GAIT - Normal base, normal stride length, non-antalgic. Able to toe walk and heel walk without difficulty.     ____________________________________  Lidocaine 1%- 4 cc's used, 0 cc's wasted  200mg/20mL (10mg/mL)  NDC 2991-4359-41  LOT [id number]  EXP 02/01/2025  Manuf: [facility]      Kenalog 40- 1 cc's used, 0 cc's wasted  NDC 6125-2701-97  LOT [id number]  EXP 09/2024  Manuf: [place] [employer]   _______________________        Procedure:  Left SI joint corticosteroid injection:  Please see saved images in the ultrasound tablet    Informed consent obtained. Site confirmed by patient. Time out taken. Ultrasound transmission gel applied and ultrasound images obtained of pre-injection sites.    Longitudinal and transverse views of left SI joints    Description of the Procedure: The procedure, risks and alternative treatments were discussed with the patient. After written informed consent was obtained, the area of most tenderness was identified over the left SI joint while the patient was supine on top of the exam table. The area was marked. Site prepped sterile with chlorhexidine scrub. Ethyl chloride spray used to anesthetize the skin. Using a 27 gauge 3.5 inch spinal needle, after negative aspiration, the area was injected with a total of 4 cc of 1% lidocaine and 1 cc of Kenalog 40 mg/mL was injected under direct US-guidance using in plane transverse approach. Location of medication confirmed by ultrasound in correct site. The patient tolerated the procedure well with no immediate complications or bleeding. No complications occurred.     Plan:   1. The patient was instructed in post-procedural care.  2. The patient was asked to apply moist heat and or ice for the next 24 hours and to  perform daily gentle stretching exercises.     _______________  Lidocaine 1%- 6 cc's used, 0 cc's wasted  200mg/20mL (10mg/mL)  NDC 1531-9837-08  LOT [id number]  EXP 02/01/2025  Manuf: [facility]     Thoracic trigger point injections:    Description of the Procedure: The procedure, risks and alternative treatments were discussed with the patient. After written informed consent was obtained, the trigger points in the thoracic paraspinal muscles were palpated and marked. The skin was prepped three times with alcohol. Using a 27 gauge 1.5 inch needle, after negative aspiration, the trigger points in each muscle were injected with a total of 6 cc's of 1% lidocaine, spread equally into 6 sites. Twitch responses were observed in the musculature. The patient tolerated the procedure well with no immediate complications or bleeding.      Plan:   1. The patient was instructed in post-procedural care.  2. The patient was asked to apply moist heat and or ice for the next 24 hours and to perform daily gentle stretching exercises.   Physical Exam  Constitutional:

## 2023-11-24 DIAGNOSIS — M53.3 SACROILIAC JOINT DYSFUNCTION OF BOTH SIDES: ICD-10-CM

## 2023-11-24 DIAGNOSIS — M46.1 SACROILIITIS (CMS-HCC): Primary | ICD-10-CM

## 2023-11-24 DIAGNOSIS — N95.2 ATROPHIC VAGINITIS: ICD-10-CM

## 2023-12-04 ENCOUNTER — APPOINTMENT (OUTPATIENT)
Dept: PHYSICAL MEDICINE AND REHAB | Facility: CLINIC | Age: 63
End: 2023-12-04
Payer: COMMERCIAL

## 2023-12-05 ENCOUNTER — PROCEDURE VISIT (OUTPATIENT)
Dept: PHYSICAL MEDICINE AND REHAB | Facility: CLINIC | Age: 63
End: 2023-12-05
Payer: COMMERCIAL

## 2023-12-05 VITALS
WEIGHT: 142 LBS | BODY MASS INDEX: 22.82 KG/M2 | SYSTOLIC BLOOD PRESSURE: 130 MMHG | TEMPERATURE: 96.6 F | HEIGHT: 66 IN | DIASTOLIC BLOOD PRESSURE: 82 MMHG | HEART RATE: 63 BPM | OXYGEN SATURATION: 100 %

## 2023-12-05 DIAGNOSIS — G89.29 CHRONIC PAIN OF RIGHT KNEE: ICD-10-CM

## 2023-12-05 DIAGNOSIS — M25.561 CHRONIC PAIN OF RIGHT KNEE: ICD-10-CM

## 2023-12-05 DIAGNOSIS — M54.50 CHRONIC LOW BACK PAIN, UNSPECIFIED BACK PAIN LATERALITY, UNSPECIFIED WHETHER SCIATICA PRESENT: ICD-10-CM

## 2023-12-05 DIAGNOSIS — Z98.890 HISTORY OF LUMBAR SURGERY: ICD-10-CM

## 2023-12-05 DIAGNOSIS — M53.3 SACROILIAC JOINT DYSFUNCTION OF BOTH SIDES: ICD-10-CM

## 2023-12-05 DIAGNOSIS — M54.6 CHRONIC THORACIC BACK PAIN, UNSPECIFIED BACK PAIN LATERALITY: ICD-10-CM

## 2023-12-05 DIAGNOSIS — G89.29 CHRONIC LOW BACK PAIN, UNSPECIFIED BACK PAIN LATERALITY, UNSPECIFIED WHETHER SCIATICA PRESENT: ICD-10-CM

## 2023-12-05 DIAGNOSIS — G89.29 CHRONIC PAIN OF LEFT UPPER EXTREMITY: ICD-10-CM

## 2023-12-05 DIAGNOSIS — M41.9 SCOLIOSIS, UNSPECIFIED SCOLIOSIS TYPE, UNSPECIFIED SPINAL REGION: ICD-10-CM

## 2023-12-05 DIAGNOSIS — G89.29 CHRONIC THORACIC BACK PAIN, UNSPECIFIED BACK PAIN LATERALITY: ICD-10-CM

## 2023-12-05 DIAGNOSIS — M79.602 CHRONIC PAIN OF LEFT UPPER EXTREMITY: ICD-10-CM

## 2023-12-05 DIAGNOSIS — M17.11 PRIMARY OSTEOARTHRITIS OF RIGHT KNEE: ICD-10-CM

## 2023-12-05 DIAGNOSIS — M54.42 LOW BACK PAIN WITH LEFT-SIDED SCIATICA, UNSPECIFIED BACK PAIN LATERALITY, UNSPECIFIED CHRONICITY: ICD-10-CM

## 2023-12-05 DIAGNOSIS — M46.1 INFLAMMATION OF SACROILIAC JOINT (CMS-HCC): ICD-10-CM

## 2023-12-05 DIAGNOSIS — M54.16 LUMBAR RADICULOPATHY: ICD-10-CM

## 2023-12-05 DIAGNOSIS — M79.18 MYOFASCIAL PAIN: ICD-10-CM

## 2023-12-05 DIAGNOSIS — M46.1 SACROILIITIS (CMS-HCC): Primary | ICD-10-CM

## 2023-12-05 PROCEDURE — 20611 DRAIN/INJ JOINT/BURSA W/US: CPT | Performed by: PHYSICAL MEDICINE & REHABILITATION

## 2023-12-05 RX ORDER — TRIAMCINOLONE ACETONIDE 40 MG/ML
40 INJECTION, SUSPENSION INTRA-ARTICULAR; INTRAMUSCULAR ONCE
Status: COMPLETED | OUTPATIENT
Start: 2023-12-05 | End: 2023-12-05

## 2023-12-05 RX ADMIN — TRIAMCINOLONE ACETONIDE 40 MG: 40 INJECTION, SUSPENSION INTRA-ARTICULAR; INTRAMUSCULAR at 14:10

## 2023-12-05 ASSESSMENT — PAIN SCALES - GENERAL: PAINLEVEL: 5

## 2023-12-05 NOTE — PATIENT INSTRUCTIONS
-Ice on and off for the next 24 hours if injection sites are sore. Do gentle range of motion exercises in each area that was injected. Try to do them every hour for about half a minute or so, in every direction that the affected part goes. No pool, bath, or hot tub today. Avoid heavy lifting for the next 2 days.    -Continue Robaxin up to 4 times daily as needed, continue meloxicam as needed  -Continue Topamax as it works for you  -Consider other medications in the future including Lyrica, Cymbalta, nortriptyline, Medrol Dosepak  -Consider knee injection  in the future if needed  -Continue daily home exercises  -Do not wear SIJ brace for more than 2 to 3 hours/day.  -Proceed with appointment with Dr. Artis, consider MJ's, cornerloc or transloc procedures, iFuse procedure (with Dr. Matute).   -Follow-up 2-3 months

## 2023-12-05 NOTE — PROGRESS NOTES
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 Physical exam is reassuring for lack of neurological compromise. Symptoms and physical exam findings in this complex patient and her of unclear etiology at the moment, but most likely a result of sacroiliac joint dysfunction and reactive myofascial pain/tension, possibly stemming from right knee osteoarthritis. However, lumbar radiculopathy and spondylosis are on the differential, as well as referral from ovarian distention into the posterior right knee, given her history of cervical cancer in the past.    -Left ultrasound-guided SI joint injections performed as above.  There were no complications and she tolerated the procedure well.  She was provided with postprocedure instructions.  -Continue Robaxin up to 4 times daily as needed, continue meloxicam as needed  -Continue Topamax as it works for you  -Consider other medications in the future including Lyrica, Cymbalta, nortriptyline, Medrol Dosepak  -Consider knee injection  in the future if needed  -Continue daily home exercises  -Do not wear SIJ brace for more than 2 to 3 hours/day.  -Proceed with appointment with Dr. Artis, consider MJ's, cornerloc or transloc procedures, iFuse procedure (with Dr. Matute).   -Follow-up 2-3 months    The patient expressed understanding and agreement with the assessment and plan. Patient encouraged to contact us should they have any questions, concerns, or any changes in symptoms.     Thank you for allowing me to participate in the care of your patient.      ** Dictated with voice recognition software, please forgive any errors in grammar and/or spelling **      Chief Complaint    Follow up on back, bilateral shoulder and right knee pain      History of Present Illness    This is  a pleasant 63-year-old right-handed woman with past medical history significant for BPPV, HLD, cervical carcinoma in situ 2012 s/p hysterectomy, who presents for follow-up chronic back, shoulder, and right knee pain.    She was last seen here on 11/21/2023, at which point I did advised her to continue her medications.    I ordered SI joint brace and handicap placard. She got the brace but hasn't tried it yet.     I advised her to continue her exercises    She will see Dr. Artis this week for consideration of minimally invasive SIJ options.     She is here today for left ultrasound-guided SI joint injections.    She rates her pain as a 4-5/10, previously 5/10.    Otherwise, there've been no changes to her medications or past medical history since the last visit.    ___________________  4/1/2020: Continue home exercises, take meloxicam as needed, Voltaren gel as needed, follow up as needed  12/7/2020: Thoracolumbar trigger point injections, resume exercises, try diclofenac instead of meloxicam and ibuprofen, try Robaxin instead of Skelaxin  1/11/2021: Start gabapentin, continue home exercises, consider MRI, follow-up in 2 months  5/10/2021: Continue exercises, continue diclofenac and Robaxin as needed, consider other medications in the future, consider injections, avoid sitting crosslegged  8/23/2021: Continue exercises, medications as needed, consider MRIs and injections in the future  1/12/2022: Bilateral upper back and lower back trigger point injections, try wedge to sleep on at night, diclofenac ordered, resume exercises  2/21/2022: Lumbar and thoracic MRI ordered, continue exercises  5/10/2022: Thoracic and lumbar MRI reviewed,, thoracic trigger point injections, referral for lumbar MJ versus facet block/RFA, continue exercises, meloxicam instead of diclofenac, continue Robaxin.  6/14/2022: Core exercises, continue medications as needed, consider repeat injections  8/29/2022: Continue medications as needed,  "daily home exercises focusing on active strengthening, consider repeat injections with Dr. Artis if needed  2/27/2023: Core exercises provided, continue meloxicam and Robaxin as needed, consider other medications, consider ultrasound-guided SI joint injections  6/5/2023: continue core exercises, continue meloxicam and Robaxin as needed, topamax 50 mg daily consider other medications, consider ultrasound-guided SI joint injections  10/13/2023: Left ultrasound-guided SI joint injection, bilateral thoracic trigger point injections, continue medications and exercises.  11/21/23: Continue medications, exercises, SI joint brace ordered, had a Licart provided, consider other SI joint options, follow-up for another injection in the meantime  12/5/2023: left US guided SIJ injection. Continue medications, exercises, SI joint brace ordered, had a Licart provided, consider other SI joint options    _______________  As a reminder:    TIMELINE OF COMPLAINT(S):  The patient had back surgery back in 2002, she is not sure what level, may be something involving L4, and she is not sure if there was fusion. This was for \"sciatica or arthritis\", and at the time she had tingling and weakness during down her right foot. Surgery helped, and she had no issues until this past June 2019. There are x-rays in June 2018, but the patient is adamant that it only started in 2019. She said that she was doing well from 2002 until 2019. At some point in the middle, about 6 years ago, she was told that she has scoliosis, but she had no main issues.     When she started feeling the pain come back in June 2019, she had some x-rays done and was told that her \"bones were deteriorating\". Then a few months later in August, she fell when she missed a step as she ran down the stairs, and landed on the top of her right hand, and since then it cramps up with certain movements, like when she wipes herself. The back of her knee also started hurting in June 2019, " "and is worse with prolonged bending and then getting up to stand.     Also in the past month, she felt the tingling \"like warm ants tingling\" going from her lower back to her right shoulder blade. This past weekend she felt pain in both posterior shoulders and neck. The left side is fine now, but she still has a dull ache in the right posterior shoulder and neck area.    The low back is the most bothersome along with the right knee. She is also had tingling in the right foot since June .    Pain:  LOCATION- low back LSJ, L=R but alternates, L groin, bilateral posterior shoulder pain , post neck and right knee and thigh posteriorly  RADIATION- Down R upper arm to elbow. no pain below knee or elbow.  ASSOCIATED WITH- no falls  NUMBNESS/TINGLING- Yes, right foot  WEAKNESS- No  CONSTANT or INTERMITTENT- Intermittent  SEVERITY/QUANTITY- 8, sitting here now 6/10 in shoulder, but none in back  QUALITY- Dull, achy  EXACERBATED BY- Nothing  BETTER WITH- Cold or warm compress  TRIED- Naproxen doesn’t help, flexeril doesn’t help just sleepy. , Tylenol Arthritis takes edge off, meloxicam hasn’t taken in while, medrol dose packs for respiratory issues and also helped with pain. No other meds for this    PHYSICAL THERAPY: Yes, last time was in 2002, does some HEP, no TENS  CHIROPRACTIC MANIPULATION: No  ACUPUNCTURE TREATMENTS: No  DEEP TISSUE MASSAGE THERAPY: Yes, helps temporarily  OSTEOPATHIC MANIPULATION THERAPY: No  INJECTIONS: No  EMG/NCS: No    IMAGING: Yes, LS xray 8/2018 L5-S1 disc space narrowing, no knee or neck imaging in our system.    FUNCTIONAL HISTORY: The patient is independent in all ADLs, mobility, and driving. The patient does not use any assistive device.    SOCIAL HISTORY:  Lives in: [facility]  Lives with: Spouse  Occupation: Clergy  Smoking:No  Alcohol: No  Drugs: No    ____________  ROS: The patient denies any bowel or bladder incontinence/accidents, night sweats, fevers, chills, recent significant " weight loss. A 14 point review of systems was reviewed with the patient and is as above and otherwise negative. ROS questionnaire positive for joint pain      Physical Exam  GEN - Alert, well-developed, well-nourished, no apparent distress  PSYCH - Cooperative, appropriate mood and affect  HEENT - NC/AT  RESP - Non-labored respirations, equal expansion  CV - well-perfused, No cyanosis or edema in extremities.   ABD- soft, ND  SKIN - No rash.    Positive Yolette and Gaenslen test on the left, significant tenderness over the left SI joint and surrounding paraspinal and gluteal musculature.  Hip range of motion itself was okay and did not reproduce hip pain.    Previous BACK/SPINE - Symmetric posture, no erythema, edema, or swelling. Full lumbar range of motion without provocation of pain symptoms. There was tenderness over both SI joints significantly, as well as bilateral gluteal muscles. No significant tenderness over the greater trochanteric bursa areas. Mild tenderness over the lumbar paraspinal muscles.. Straight leg raise negative bilaterally. Sitting slump test negative bilaterally. Femoral stretch test negative bilaterally.     Previous HIPS/PELVIS - Symmetric in standing and lying Passive hip flexion, internal rotation, and external rotation within functional normal limits bilaterally without provocation of pain symptoms. No tenderness over iliopsoas tendon.positive FABERs on the left, positive Gaenslen's on the right. Negative hip clicking. Negative hip impingement test. Negative log roll. Negative resisted active SLR. No pain with deep hip flexion.patient was not able to do single leg sit to stand on either side    Previous NEURO - UE strength 5/5 - including shoulder abduction, biceps, triceps, wrist extensors, finger flexors, interossei, and    LE strength 5/5 - including hip flexors, knee flexors, knee extensors, ankle dorsiflexors, ankle plantar flexors, and EHL   Sensation - intact to light touch in  bilateral lower and upper extremities.   Reflexes - diminished but equal biceps, brachioradialis, triceps, patellar and Achilles reflexes bilaterally No Clonus, No Babinski, Renee's negative bilaterally  GAIT - Normal base, normal stride length, non-antalgic. Able to toe walk and heel walk without difficulty.     ____________________________________  Lidocaine 1%- 4 cc's used, 0 cc's wasted  200mg/20mL (10mg/mL)  NDC 1608-8682-72  LOT [id number]  EXP 03/01/2025  Manuf: [facility]      Kenalog 40- 1 cc's used, 0 cc's wasted  NDC 8720-3595-43  LOT [id number]  EXP 10/2025  Manuf: [facility]     Procedure:  Left SI joint corticosteroid injection:  Please see saved images in the ultrasound tablet    Informed consent obtained. Site confirmed by patient. Time out taken. Ultrasound transmission gel applied and ultrasound images obtained of pre-injection sites.    Longitudinal and transverse views of Left SI joints    Description of the Procedure: The procedure, risks and alternative treatments were discussed with the patient. After written informed consent was obtained, the area of most tenderness was identified over the left SI joint while the patient was supine on top of the exam table. The area was marked. Site prepped sterile with chlorhexidine scrub. Ethyl chloride spray used to anesthetize the skin. Using a 27 gauge 3.5 inch spinal needle, after negative aspiration, the area was injected with a total of 4 cc of 1% lidocaine and 1 cc of Kenalog 40 mg/mL was injected under direct US-guidance using in plane transverse approach. Location of medication confirmed by ultrasound in correct site. The patient tolerated the procedure well with no immediate complications or bleeding. No complications occurred.     Plan:   1. The patient was instructed in post-procedural care.  2. The patient was asked to apply moist heat and or ice for the next 24 hours and to perform daily gentle stretching exercises.      Physical Exam

## 2023-12-08 ENCOUNTER — OFFICE VISIT (OUTPATIENT)
Dept: PAIN MEDICINE | Facility: HOSPITAL | Age: 63
End: 2023-12-08
Payer: COMMERCIAL

## 2023-12-08 DIAGNOSIS — M53.3 SACROILIAC JOINT DYSFUNCTION OF BOTH SIDES: Primary | ICD-10-CM

## 2023-12-08 DIAGNOSIS — M54.16 LUMBAR RADICULITIS: ICD-10-CM

## 2023-12-08 DIAGNOSIS — M46.1 SACROILIITIS (CMS-HCC): ICD-10-CM

## 2023-12-08 DIAGNOSIS — N95.2 ATROPHIC VAGINITIS: ICD-10-CM

## 2023-12-08 PROCEDURE — 99214 OFFICE O/P EST MOD 30 MIN: CPT | Performed by: ANESTHESIOLOGY

## 2023-12-08 PROCEDURE — 1036F TOBACCO NON-USER: CPT | Performed by: ANESTHESIOLOGY

## 2023-12-08 ASSESSMENT — PAIN SCALES - GENERAL: PAINLEVEL: 6

## 2023-12-08 NOTE — PROGRESS NOTES
Subjective   Patient ID: Lilli Aldridge is a 63 y.o. female with a past medical history of HLD, scoliosis and BPPV.    HPI:   Presents to the clinic with complaints of low back and groin pain mostly on the left side. Occasionally radiates down the front of her thigh and doesn't go further than her knee. Pain started a few years ago, without an inciting event. Pain is always present, made worse by standing, walking and climbing stairs. Has trouble walking around a grocery store, with or without a shopping cart. So far, she has found the meloxicam and topamax help control her pain. Robaxin helps during her flare ups. Currently pain is 4-5/10, but can get up to an 8-9.     Has done a course of PT, is continuing her exercises at home.     Underwent a left sided SI injection by PM&R on 12/5, so far has not noticed a significant improvement. Underwent a bilateral L5 transforaminal epidural steroid injection in May of 2022 without any relief from it.     MRI from April '22 notable for moderate bilateral neural foraminal narrowing at L3-4, moderate to severe bilateral neural foraminal narrowing at L4-5 and moderate to severe right and moderate left-sided neuroforaminal narrowing at L5-S1.    Cannot tolerate gabapentin and duloxetine due to the side effects.     Physical Therapy: The patient has done formal physical therapy in the past and as continued with physician-directed exercises at home for more than 3 days per week.     Classes of medications tried in the past: Acetaminophen, NSAIDs, Antiepileptic agents, and Muscle Relaxants    Review of Systems   13-point ROS done and negative except for HPI.     Current Outpatient Medications   Medication Instructions    acetaminophen (Tylenol 8 HOUR) 650 mg ER tablet 1 tablet, oral, 3-4 times daily as needed for pain    acetaminophen (TylenoL) 325 mg tablet 2 tablets, oral, Every 6 hours PRN    albuterol 90 mcg/actuation inhaler 2 puffs, inhalation, Every 6 hours PRN    calcium  carbonate-vitamin D3 1,000 mg-20 mcg (800 unit) tablet Calcium 1000 + D 1000-800 MG-UNIT Oral Tablet Take 1 tablet daily Quantity: 0 Refills: 0 Ordered: 2-Sep-2021 Miracle Velasco MD Start : 2-Sep-2021 Active    chlorpheniramine (Chlor-Trimeton) 4 mg tablet 1 tablet, oral, Every 4-6 hours as needed.    flunisolide (Nasalide) 25 mcg (0.025 %) spray,non-aerosol 2 sprays, Each Nostril, 2 times daily    loratadine (Claritin) 10 mg tablet 1 tablet, oral, Daily    meclizine (Antivert) 12.5 mg tablet 1 tablet, oral, 3 times daily PRN    meloxicam (MOBIC) 15 mg, oral, As needed    methocarbamol (Robaxin) 500 mg tablet 1-2 tablets, oral, 4 times daily, As needed    omeprazole (PriLOSEC) 20 mg DR capsule 1 capsule, oral, Daily before breakfast    topiramate (TOPAMAX) 50 mg, oral, 2 times daily    vit C-Zn gluc-herbal no.325 (Elderberry Zinc Vit C) 90-15 mg lozenge 1 lozenge, oral, Daily       Past Medical History:   Diagnosis Date    Cervical high risk human papillomavirus (HPV) DNA test positive     Cervical high risk HPV (human papillomavirus) test positive    Cough variant asthma 11/25/2017    Cough variant asthma    Lateral epicondylitis, unspecified elbow     Lateral epicondylitis (tennis elbow)    Personal history of diseases of the blood and blood-forming organs and certain disorders involving the immune mechanism     History of neutropenia    Personal history of other diseases of the musculoskeletal system and connective tissue     History of scoliosis    Urge incontinence 09/16/2022    Urge incontinence    Urinary tract infection, site not specified 09/15/2022    Recurrent UTI        Past Surgical History:   Procedure Laterality Date    BACK SURGERY  07/06/2016    Back Surgery    HYSTERECTOMY  07/06/2016    Hysterectomy    MOUTH SURGERY  11/09/2017    Oral Surgery Tooth Extraction    OTHER SURGICAL HISTORY  11/09/2017    Cervical Conization By Cold Knife    TONSILLECTOMY  11/09/2017    Tonsillectomy    TUBAL LIGATION   07/06/2016    Tubal Ligation        Family History   Problem Relation Name Age of Onset    Other (Diabetes mellitus) Mother      Hypertension Mother      Other (Diabetes mellitus) Father      Hypertension Father      Liver cancer Father      Decreased libido Sister          Allergies   Allergen Reactions    Penicillins Hives and Unknown     Nausea    Cat Dander Unknown    Dog Dander Unknown    Erythromycin Other, Nausea And Vomiting, Nausea Only and Nausea/vomiting        Objective     There were no vitals filed for this visit.     Physical Exam      General: NAD, well groomed, well nourished  Eyes: Non-icteric sclera, EOMI  Ears, Nose, Mouth, and Throat: External ears and nose appear to be without deformity or rash. No lesions or masses noted. Hearing is grossly intact.   Neck: Trachea midline  Respiratory: Nonlabored breathing   Cardiovascular: no peripheral edema   Skin: No rashes or open lesions/ulcers identified on skin.    Back:   Palpation: Tenderness to palpation over lumbar paraspinous area L1-L3  Straight leg raise: does not reproduce their pain, bilaterally   SI Joint: Pain with NEDA, Pain with sacral spring, Evan finger sign present, and on the LEFT  Hip: No pain over the greater trochanters, pain with external rotation on the left side.    Neurologic:   Cranial nerves grossly intact.   Strength 5/5 with the exception of 4/5 knee flexion bilaterally.   Sensation: Normal to light touch throughout, pinprick intact throughout.  DTRs:normal and symmetric throughout  Renee: absent    Psychiatric: Alert, orientation to person, place, and time. Cooperative.    Assessment/Plan   Lilli Aldridge is a 63 y.o. female with a past medical history of HLD, scoliosis and BPPV who presents with left back pain which radiates into her left groin. Pain is exacerbated by standing or walking for too long. Underwent a bilateral L5 transforaminal in May of 22 without significant relief. Recently received a left sided SI  injection with Dr. Keith a few days ago, but time frame is too recent to evaluate its efficacy just yet. Physical exam notable for positive NEDA, sacral spring and Evan finger on the left as well as left paraspinal tenderness around L1-L3. Notes that she has more pain with walking for a prolonged distance than getting up from a seated position. Likely has a component of SI as well as pain due to foraminal stenosis.     Plan:  -Schedule for L3 and L4 transforaminal epidural steroid injection on the left.  Risks, benefits and alternatives of the procedure were discussed with the patient who expressed understanding and agrees to proceed.   -Based on the results of the epidural, we can consider ablation of the lateral branches of the dorsal rami for SI pain       We discussed  the risks, benefits and alternatives of the procedure including but not limited to: , Lack of efficacy , Transiently worsening pain , Bleeding, Infection , and Nerve Damage    Follow up:  After procedure    The patient was invited to contact us back anytime with any questions or concerns and follow-up with us in the office as needed.     Diagnoses and all orders for this visit:  Sacroiliitis (CMS/[facility])  -     Referral to Pain Medicine  Sacroiliac joint dysfunction of both sides  -     Referral to Pain Medicine  Atrophic vaginitis  -     Referral to Pain Medicine

## 2023-12-26 ENCOUNTER — HOSPITAL ENCOUNTER (OUTPATIENT)
Dept: RADIOLOGY | Facility: HOSPITAL | Age: 63
Discharge: HOME | End: 2023-12-26
Payer: COMMERCIAL

## 2023-12-26 VITALS
SYSTOLIC BLOOD PRESSURE: 128 MMHG | WEIGHT: 140 LBS | DIASTOLIC BLOOD PRESSURE: 71 MMHG | HEIGHT: 66 IN | OXYGEN SATURATION: 100 % | BODY MASS INDEX: 22.5 KG/M2 | HEART RATE: 56 BPM | RESPIRATION RATE: 18 BRPM | TEMPERATURE: 98.1 F

## 2023-12-26 DIAGNOSIS — M54.16 LUMBAR RADICULITIS: ICD-10-CM

## 2023-12-26 DIAGNOSIS — M53.3 SACROILIAC JOINT DYSFUNCTION OF BOTH SIDES: ICD-10-CM

## 2023-12-26 PROCEDURE — 64483 NJX AA&/STRD TFRM EPI L/S 1: CPT | Mod: LT | Performed by: ANESTHESIOLOGY

## 2023-12-26 PROCEDURE — 99152 MOD SED SAME PHYS/QHP 5/>YRS: CPT | Performed by: ANESTHESIOLOGY

## 2023-12-26 PROCEDURE — 64483 NJX AA&/STRD TFRM EPI L/S 1: CPT | Performed by: ANESTHESIOLOGY

## 2023-12-26 PROCEDURE — 77003 FLUOROGUIDE FOR SPINE INJECT: CPT

## 2023-12-26 PROCEDURE — 64484 NJX AA&/STRD TFRM EPI L/S EA: CPT | Mod: LT | Performed by: ANESTHESIOLOGY

## 2023-12-26 PROCEDURE — 2500000004 HC RX 250 GENERAL PHARMACY W/ HCPCS (ALT 636 FOR OP/ED): Performed by: ANESTHESIOLOGY

## 2023-12-26 PROCEDURE — 64484 NJX AA&/STRD TFRM EPI L/S EA: CPT | Performed by: ANESTHESIOLOGY

## 2023-12-26 RX ORDER — MIDAZOLAM HYDROCHLORIDE 1 MG/ML
INJECTION INTRAMUSCULAR; INTRAVENOUS
Status: COMPLETED | OUTPATIENT
Start: 2023-12-26 | End: 2023-12-26

## 2023-12-26 RX ADMIN — MIDAZOLAM HYDROCHLORIDE 2 MG: 1 INJECTION INTRAMUSCULAR; INTRAVENOUS at 08:31

## 2023-12-26 ASSESSMENT — PAIN SCALES - GENERAL
PAINLEVEL_OUTOF10: 5 - MODERATE PAIN
PAINLEVEL_OUTOF10: 4
PAINLEVEL_OUTOF10: 5 - MODERATE PAIN
PAINLEVEL_OUTOF10: 0 - NO PAIN
PAINLEVEL_OUTOF10: 0 - NO PAIN

## 2023-12-26 ASSESSMENT — PAIN - FUNCTIONAL ASSESSMENT
PAIN_FUNCTIONAL_ASSESSMENT: 0-10

## 2023-12-26 NOTE — H&P
Pain Management H&P    History Of Present Illness  Lilli Adlridge is a 63 y.o. female with a past medical history of left back pain who presents for left L3 and L4 transforaminal epidural steroid injection.     Past Medical History  She has a past medical history of Cervical high risk human papillomavirus (HPV) DNA test positive, Cough variant asthma (11/25/2017), Lateral epicondylitis, unspecified elbow, Personal history of diseases of the blood and blood-forming organs and certain disorders involving the immune mechanism, Personal history of other diseases of the musculoskeletal system and connective tissue, Urge incontinence (09/16/2022), and Urinary tract infection, site not specified (09/15/2022).    Surgical History  She has a past surgical history that includes Other surgical history (11/09/2017); Mouth surgery (11/09/2017); Tonsillectomy (11/09/2017); Back surgery (07/06/2016); Hysterectomy (07/06/2016); and Tubal ligation (07/06/2016).     Social History  She reports that she has never smoked. She has never been exposed to tobacco smoke. She has never used smokeless tobacco. She reports that she does not drink alcohol and does not use drugs.    Family History  Family History   Problem Relation Name Age of Onset    Other (Diabetes mellitus) Mother      Hypertension Mother      Other (Diabetes mellitus) Father      Hypertension Father      Liver cancer Father      Decreased libido Sister          Allergies  Penicillins, Cat dander, Dog dander, and Erythromycin    Review of Symptoms:   Constitutional: Negative for chills, diaphoresis or fever  HENT: Negative for neck swelling  Eyes:.  Negative for eye pain  Respiratory:.  Negative for cough, shortness of breath or wheezing    Cardiovascular:.  Negative for chest pain or palpitations  Gastrointestinal:.  Negative for abdominal pain, nausea and vomiting  Genitourinary:.  Negative for urgency  Musculoskeletal:  Positive for back pain. Positive for joint pain.  Denies falls within the past 3 months.  Skin: Negative for wounds or itching   Neurological: Negative for dizziness, seizures, loss of consciousness and weakness  Endo/Heme/Allergies: Does not bruise/bleed easily  Psychiatric/Behavioral: Negative for depression. The patient does not appear anxious.       PHYSICAL EXAM  Vitals signs reviewed  Constitutional:       General: Not in acute distress     Appearance: Normal appearance. Not ill-appearing.  HENT:     Head: Normocephalic and atraumatic  Eyes:     Conjunctiva/sclera: Conjunctivae normal  Cardiovascular:     Rate and Rhythm: Normal rate and regular rhythm  Pulmonary:     Effort: No respiratory distress  Abdominal:     Palpations: Abdomen is soft  Musculoskeletal: DIAMOND  Skin:     General: Skin is warm and dry  Neurological:     General: No focal deficit present  Psychiatric:         Mood and Affect: Mood normal         Behavior: Behavior normal     Last Recorded Vitals  There were no vitals taken for this visit.    Relevant Results  Current Outpatient Medications   Medication Instructions    acetaminophen (Tylenol 8 HOUR) 650 mg ER tablet 1 tablet, oral, 3-4 times daily as needed for pain    acetaminophen (TylenoL) 325 mg tablet 2 tablets, oral, Every 6 hours PRN    albuterol 90 mcg/actuation inhaler 2 puffs, inhalation, Every 6 hours PRN    calcium carbonate-vitamin D3 1,000 mg-20 mcg (800 unit) tablet Calcium 1000 + D 1000-800 MG-UNIT Oral Tablet Take 1 tablet daily Quantity: 0 Refills: 0 Ordered: 2-Sep-2021 Miracle Velasco MD Start : 2-Sep-2021 Active    chlorpheniramine (Chlor-Trimeton) 4 mg tablet 1 tablet, oral, Every 4-6 hours as needed.    flunisolide (Nasalide) 25 mcg (0.025 %) spray,non-aerosol 2 sprays, Each Nostril, 2 times daily    loratadine (Claritin) 10 mg tablet 1 tablet, oral, Daily    meclizine (Antivert) 12.5 mg tablet 1 tablet, oral, 3 times daily PRN    meloxicam (MOBIC) 15 mg, oral, As needed    methocarbamol (Robaxin) 500 mg tablet 1-2  tablets, oral, 4 times daily, As needed    omeprazole (PriLOSEC) 20 mg DR capsule 1 capsule, oral, Daily before breakfast    topiramate (TOPAMAX) 50 mg, oral, 2 times daily    vit C-Zn gluc-herbal no.325 (Elderberry Zinc Vit C) 90-15 mg lozenge 1 lozenge, oral, Daily         MR LUMBAR SPINE WO IV CONTRAST 04/21/2022    Narrative  MRN: [id number]  Patient Name: SILVANA MARTIN    STUDY:  MRI L-SPINE WO; MRI T-SPINE WO;  4/21/2022 11:27 am    INDICATION:  Ongoing thoracic and lumbar, RLE pain depsite conservative management  including PT, daily home exercises  M54.6: Thoracic back pain.    COMPARISON:  None.    ACCESSION NUMBER(S):  [id number]; [id number]    ORDERING CLINICIAN:  GEORGE PERSAUD    TECHNIQUE:  Sagittal T2, sagittal STIR, sagittal T1, axial T2, axial T1 weighted  MRI images of the lumbar spine were obtained without intravenous  contrast administration.    FINDINGS:  The coronal  images demonstrate a mild levocurvature of the  thoracic and lumbar spine.    There is minimal 1-2 mm of retrolisthesis of L2 on L3, L1 on L2, and  T12 on L1.    There are degenerative signal changes noted along endplates within  the thoracic and lumbar region.    The visualized spinal cord demonstrates no signal abnormality within  it.  The conus medullaris is normally positioned terminating at the  inferior L1 level.    There is a small perineural cyst/Tarlov cyst noted within the spinal  canal in sacral region the largest of which measures approximate 12  mm in greatest sagittal dimension at the S3 level as seen on sagittal  slice 13 of 25.    At the L5/S1 level,  there is posterior osteophytic spurring along  with degenerative facet changes. There is mild encroachment upon the  ventral spinal canal without significant narrowing of the thecal sac  within the spinal canal. There is moderate to severe right and  moderate left-sided neural foraminal narrowing.    At the L4/L5 level,  there is a posterior disc bulge along  with  hypertrophic degenerative facet changes and mild ligamentum flavum  hypertrophy contributing to mild overall narrowing of the thecal sac  within the spinal canal. There is moderate to severe bilateral neural  foraminal narrowing.    At the L3/L4 level,  there is a posterior disc bulge along with  degenerative facet changes and ligamentum flavum hypertrophy  contributing to mild overall narrowing of the thecal sac within the  spinal canal. There is moderate bilateral neural foraminal narrowing.    At the L2/L3 level,  there is 1-2 mm of retrolisthesis of L2 on L3,  posterior disc bulge, along with degenerative facet changes  contribute to mild spinal canal narrowing. There is mild-to-moderate  encroachment upon the neural foramen bilaterally.    At the L1/L2 level,  there is 1-2 mm of retrolisthesis of L1 on L2,  mild posterior osteophytic spurring and posterior disc bulge along  with mild degenerative facet changes contribute to mild encroachment  upon the spinal canal. There is mild encroachment upon the inferior  recesses of the neural foramen bilaterally.    At the T12/L1 level,  there is a mild posterior disc bulge and  degenerative facet changes contribute to mild encroachment on the  spinal canal. There is mild encroachment upon the inferior recess of  the right neural foramen. There is no significant left-sided neural  foraminal narrowing.    At the T11/12 level, there is a mild posterior disc bulge and  degenerative facet changes contribute to mild encroachment upon the  spinal canal. There is a small perineural cyst/Tarlov cyst extending  through the right neural foramen.    At the T10/11 level, there is a minimal posterior disc osteophyte  complex along with mild degenerative facet changes contribute to mild  encroachment upon the spinal canal without spinal cord deformity.    At the T9/10 level, there are mild degenerative facet changes without  significant spinal canal narrowing.    At the T8/9  level, there are mild degenerative facet changes without  significant spinal canal narrowing.    At the T7/8 level, there is no significant spinal canal narrowing.    At the T6/7 level, there is a minimal posterior disc bulge without  significant spinal canal narrowing.    At the T5/6 level, there are mild degenerative facet changes without  significant spinal canal narrowing.    At the T4/5 level, there are mild degenerative facet changes without  significant spinal canal narrowing.    At the T3/4 level, there is a minimal posterior disc and/or disc  osteophyte complex and mild degenerative facet changes without  significant spinal canal narrowing.    At the T2/3 level, there is no significant spinal canal narrowing.    At the T1/2 level, there is no significant spinal canal narrowing.    At the C7/T1 level, there is a mild posterior disc/osteophyte complex  contribute to mild flattening of ventral subarachnoid space without  significant spinal canal narrowing or spinal cord deformity.    There are a few small subcentimeter foci of bright signal on the T2  weighted images noted within the left kidney raising the possibility  of small nonspecific cystic foci within the left kidney.    Impression  The coronal  images demonstrate a mild levocurvature of the  thoracic and lumbar spine.    There is minimal 1-2 mm of retrolisthesis of L2 on L3, L1 on L2, and  T12 on L1.    There is multilevel spondylosis within the thoracic and lumbar region  with varying degrees of spinal canal and neural foraminal narrowing  as described above.    There are a few small subcentimeter foci of bright signal on the T2  weighted images noted within the left kidney raising the possibility  of small nonspecific cystic foci within the left kidney.    The study was interpreted at [facility].     No image results found.       No diagnosis found.     ASSESSMENT/PLAN  Lilli Aldridge is a 63 y.o. female with  a past medical history of left back pain who presents for left L3 and L4 transforaminal epidural steroid injection.    Our plan is as follows:  - Proceed with aforementioned procedure          DO RUSS Turcios Pain fellow

## 2023-12-26 NOTE — PROCEDURES
Pre-Op Diagnosis: Lumbar neuroforaminal stenosis with radicular pain  Post-Procedure Diagnosis: Same as preop diagnosis  Procedure: Left L3 and left L4 transforaminal epidural steroid injection using fluoroscopic guidance  Surgeon: Elisabet Artis MD, PhD   Resident/Fellow/Other Assistant: Julio Ling MD     Procedure Note:     Ms. Lilli Aldridge is a 63 y.o. female with symptoms of neurogenic claudication on the left side and evidence on MRI of moderate bilateral neuroforaminal stenosis at L3/4 and moderate to severe bilateral neuroforaminal stenosis at L4/5 presents today for left L3 and left L4 transforaminal epidural steroid injection.    The patient was identified in the preoperative area and informed consent was obtained.  The patient was then brought to the procedure room and placed in the prone position.  Using fluoroscopic guidance, the skin and subcutaneous tissue overlying needle trajectories to the left L3/4 and L4/5 foramina were anesthetized with a total of 3 cc of Lidocaine.  22-gauge pencil point needles were then advanced under fluoroscopic guidance into the foramina where the L3 and L4 nerve roots exit on the left.  Needle positions were confirmed in AP and lateral views.  Injection of iohexol contrast under live fluoroscopy revealed appropriate epidural spread without vascular uptake.  Thereafter, 0.5 cc of 0.5% lidocaine with 5 mg dexamethasone was injected into each needle tip and the needles removed.  Patient was then transferred to the recovery room in stable condition and will update us on outpatient basis on the response to the procedure.

## 2023-12-28 RX ORDER — TRIAMCINOLONE ACETONIDE 40 MG/ML
40 INJECTION, SUSPENSION INTRA-ARTICULAR; INTRAMUSCULAR ONCE
Status: COMPLETED | OUTPATIENT
Start: 2023-10-13 | End: 2023-10-13

## 2023-12-28 NOTE — ADDENDUM NOTE
Addended by: GEORGE PERSAUD on: 12/28/2023 03:02 PM     Modules accepted: Orders     Per Dr. Radford Collet: \"    GI staff:  Please recall for EGD in 1 year. \"

## 2024-01-09 DIAGNOSIS — M54.16 LUMBAR RADICULOPATHY: ICD-10-CM

## 2024-01-09 DIAGNOSIS — M53.3 SACROILIAC JOINT DYSFUNCTION OF BOTH SIDES: Primary | ICD-10-CM

## 2024-01-18 ENCOUNTER — OFFICE VISIT (OUTPATIENT)
Dept: OPHTHALMOLOGY | Facility: CLINIC | Age: 64
End: 2024-01-18
Payer: COMMERCIAL

## 2024-01-18 DIAGNOSIS — H43.811 PVD (POSTERIOR VITREOUS DETACHMENT), RIGHT EYE: Primary | ICD-10-CM

## 2024-01-18 DIAGNOSIS — H43.391 VITREOUS SYNERESIS OF RIGHT EYE: ICD-10-CM

## 2024-01-18 DIAGNOSIS — H43.392 VITREOUS SYNERESIS OF LEFT EYE: ICD-10-CM

## 2024-01-18 PROCEDURE — 99213 OFFICE O/P EST LOW 20 MIN: CPT | Performed by: STUDENT IN AN ORGANIZED HEALTH CARE EDUCATION/TRAINING PROGRAM

## 2024-01-18 ASSESSMENT — CUP TO DISC RATIO
OD_RATIO: .3
OS_RATIO: .3

## 2024-01-18 ASSESSMENT — ENCOUNTER SYMPTOMS
CONSTITUTIONAL NEGATIVE: 0
HEMATOLOGIC/LYMPHATIC NEGATIVE: 0
CARDIOVASCULAR NEGATIVE: 0
MUSCULOSKELETAL NEGATIVE: 0
ALLERGIC/IMMUNOLOGIC NEGATIVE: 0
ENDOCRINE NEGATIVE: 0
RESPIRATORY NEGATIVE: 0
EYES NEGATIVE: 0
PSYCHIATRIC NEGATIVE: 0
GASTROINTESTINAL NEGATIVE: 0
NEUROLOGICAL NEGATIVE: 0

## 2024-01-18 ASSESSMENT — VISUAL ACUITY
OD_CC: 20/20
OS_CC: 20/20-2
METHOD: SNELLEN - LINEAR
CORRECTION_TYPE: GLASSES

## 2024-01-18 ASSESSMENT — CONF VISUAL FIELD
OD_INFERIOR_NASAL_RESTRICTION: 0
OD_INFERIOR_TEMPORAL_RESTRICTION: 0
OS_SUPERIOR_TEMPORAL_RESTRICTION: 0
METHOD: COUNTING FINGERS
OS_SUPERIOR_NASAL_RESTRICTION: 0
OS_NORMAL: 1
OD_SUPERIOR_NASAL_RESTRICTION: 0
OD_SUPERIOR_TEMPORAL_RESTRICTION: 0
OS_INFERIOR_NASAL_RESTRICTION: 0
OS_INFERIOR_TEMPORAL_RESTRICTION: 0
OD_NORMAL: 1

## 2024-01-18 ASSESSMENT — SLIT LAMP EXAM - LIDS
COMMENTS: GOOD POSITION
COMMENTS: GOOD POSITION

## 2024-01-18 ASSESSMENT — REFRACTION_WEARINGRX
OS_AXIS: 015
OD_AXIS: 090
OS_ADD: +2.50
OS_SPHERE: +0.75
OD_CYLINDER: -0.50
OD_ADD: +2.50
OD_SPHERE: PLANO
OS_CYLINDER: -0.50

## 2024-01-18 ASSESSMENT — TONOMETRY
OD_IOP_MMHG: 18
IOP_METHOD: GOLDMANN APPLANATION
OS_IOP_MMHG: 18

## 2024-01-18 ASSESSMENT — EXTERNAL EXAM - LEFT EYE: OS_EXAM: NORMAL

## 2024-01-18 ASSESSMENT — EXTERNAL EXAM - RIGHT EYE: OD_EXAM: NORMAL

## 2024-01-18 NOTE — PROGRESS NOTES
Assessment/Plan   Diagnoses and all orders for this visit:  PVD (posterior vitreous detachment), right eye  Vitreous syneresis of right eye  Vitreous syneresis of left eye  -patient with continued flashes of light OU; flashes are noticeable in the inferior portion of visual field OU and last for seconds; no new floaters.   -on exam stable ocular health  -posterior segment wnl and retina intact without holes or tears  -PVD noted prior OD and syneresis noted OU with likely impending PVD OS  -patient ed on etiology; Discussed signs and symtpoms of retinal hole, tear, detachment. Patient educated that retinal detachment can lead to permanent vision loss. Patient consents to return if they notice new floaters, flashes, loss of vision, curtain or veil covering vision.    RTC for annual exams with DFE and MRX or sooner prn

## 2024-01-23 ENCOUNTER — APPOINTMENT (OUTPATIENT)
Dept: RADIOLOGY | Facility: CLINIC | Age: 64
End: 2024-01-23
Payer: COMMERCIAL

## 2024-02-12 NOTE — PROGRESS NOTES
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 Physical exam is reassuring for lack of neurological compromise. Symptoms and physical exam findings in this complex patient and her of unclear etiology at the moment, but most likely a result of sacroiliac joint dysfunction and reactive myofascial pain/tension, possibly stemming from right knee osteoarthritis. However, lumbar radiculopathy and spondylosis are on the differential, as well as referral from ovarian distention into the posterior right knee, given her history of cervical cancer in the past.    -Prednisone taper ordered. The medication mechanism, side effects as well as the risks, benefits, and alternatives were discussed. Goals of therapy discussed. The patient expressed understanding of this.    -Do not take meloxicam while on prednisone, but if you still have pain after the prednisone taper then you can restart meloxicam daily with food  -Increase Topamax to 100 mg twice a day.  Do so slowly by increasing 50 mg at a time every week or 2.  -Continue Robaxin up to 4 times daily as needed,  -Consider other medications in the future including Lyrica, Cymbalta, nortriptyline  -Consider knee injection  in the future if needed  -Continue daily home exercises  -Do not wear SIJ brace for more than 2 to 3 hours/day.  -Proceed with repeat MRI.  -Consider repeat ultrasound-guided SI joint injection in the future once this new pain calms down and then if it helps, then consider cornerloc or transloc procedures with Dr. Artis versus iFuse procedure (with Dr. Matute).   -Follow-up after MRI    The patient expressed understanding and agreement with the assessment and plan. Patient encouraged to contact us should they have any questions, concerns, or any changes in  symptoms.     Thank you for allowing me to participate in the care of your patient.      ** Dictated with voice recognition software, please forgive any errors in grammar and/or spelling **      Chief Complaint    Follow up on back, bilateral shoulder and right knee pain      History of Present Illness    This is a pleasant 63-year-old right-handed woman with past medical history significant for BPPV, HLD, cervical carcinoma in situ 2012 s/p hysterectomy, who presents for follow-up chronic back, shoulder, and right knee pain.    She was last seen here 12/5/2023, at which point I did a left ultrasound-guided SI joint injection. This did not help unfortunately.    I advised her to continue Topamax and Robaxin.    I advised her to continue her daily home exercises and not to wear the SI joint brace for more than 2 to 3 hours/day.      I advised her to follow-up with Dr. Artis for options on minimally invasive SI joint fusion procedures.  She saw him on 12/8/2023, note personally reviewed today. She underwent a left L3 and L4 transforaminal MJ with him on 12/26/2023, procedure note personally reviewed today. This did not help and she has had persistent L3 distribution pain on her left since then, new pain. Dr. Artis wants to do another MRI, but she needs sedation. So its not scheduled until April.    She rates her pain as a 5/10, previously 4-5/10.    Otherwise, there've been no changes to her medications or past medical history since the last visit.    ___________________  4/1/2020: Continue home exercises, take meloxicam as needed, Voltaren gel as needed, follow up as needed  12/7/2020: Thoracolumbar trigger point injections, resume exercises, try diclofenac instead of meloxicam and ibuprofen, try Robaxin instead of Skelaxin  1/11/2021: Start gabapentin, continue home exercises, consider MRI, follow-up in 2 months  5/10/2021: Continue exercises, continue diclofenac and Robaxin as needed, consider other medications in  the future, consider injections, avoid sitting crosslegged  8/23/2021: Continue exercises, medications as needed, consider MRIs and injections in the future  1/12/2022: Bilateral upper back and lower back trigger point injections, try wedge to sleep on at night, diclofenac ordered, resume exercises  2/21/2022: Lumbar and thoracic MRI ordered, continue exercises  5/10/2022: Thoracic and lumbar MRI reviewed,, thoracic trigger point injections, referral for lumbar MJ versus facet block/RFA, continue exercises, meloxicam instead of diclofenac, continue Robaxin.  6/14/2022: Core exercises, continue medications as needed, consider repeat injections  8/29/2022: Continue medications as needed, daily home exercises focusing on active strengthening, consider repeat injections with Dr. Artis if needed  2/27/2023: Core exercises provided, continue meloxicam and Robaxin as needed, consider other medications, consider ultrasound-guided SI joint injections  6/5/2023: continue core exercises, continue meloxicam and Robaxin as needed, topamax 50 mg daily consider other medications, consider ultrasound-guided SI joint injections  10/13/2023: Left ultrasound-guided SI joint injection, bilateral thoracic trigger point injections, continue medications and exercises.  11/21/23: Continue medications, exercises, SI joint brace ordered, had a Licart provided, consider other SI joint options, follow-up for another injection in the meantime  12/5/2023: left US guided SIJ injection. Continue medications, exercises, SI joint brace ordered, had a Licart provided, consider other SI joint options  2/13/24: Prednisone taper, increase Topamax, restart meloxicam, continue exercises, do not wear brace for more than 2 to 3 hours, proceed with MRI, follow-up for tremor  _______________  As a reminder:    TIMELINE OF COMPLAINT(S):  The patient had back surgery back in 2002, she is not sure what level, may be something involving L4, and she is not sure  "if there was fusion. This was for \"sciatica or arthritis\", and at the time she had tingling and weakness during down her right foot. Surgery helped, and she had no issues until this past June 2019. There are x-rays in June 2018, but the patient is adamant that it only started in 2019. She said that she was doing well from 2002 until 2019. At some point in the middle, about 6 years ago, she was told that she has scoliosis, but she had no main issues.     When she started feeling the pain come back in June 2019, she had some x-rays done and was told that her \"bones were deteriorating\". Then a few months later in August, she fell when she missed a step as she ran down the stairs, and landed on the top of her right hand, and since then it cramps up with certain movements, like when she wipes herself. The back of her knee also started hurting in June 2019, and is worse with prolonged bending and then getting up to stand.     Also in the past month, she felt the tingling \"like warm ants tingling\" going from her lower back to her right shoulder blade. This past weekend she felt pain in both posterior shoulders and neck. The left side is fine now, but she still has a dull ache in the right posterior shoulder and neck area.    The low back is the most bothersome along with the right knee. She is also had tingling in the right foot since June .    Pain:  LOCATION- low back LSJ, L=R but alternates, L groin, bilateral posterior shoulder pain , post neck and right knee and thigh posteriorly  RADIATION- Down R upper arm to elbow. no pain below knee or elbow.  ASSOCIATED WITH- no falls  NUMBNESS/TINGLING- Yes, right foot  WEAKNESS- No  CONSTANT or INTERMITTENT- Intermittent  SEVERITY/QUANTITY- 8, sitting here now 6/10 in shoulder, but none in back  QUALITY- Dull, achy  EXACERBATED BY- Nothing  BETTER WITH- Cold or warm compress  TRIED- Naproxen doesn’t help, flexeril doesn’t help just sleepy. , Tylenol Arthritis takes edge off, " meloxicam hasn’t taken in while, medrol dose packs for respiratory issues and also helped with pain. No other meds for this    PHYSICAL THERAPY: Yes, last time was in 2002, does some HEP, no TENS  CHIROPRACTIC MANIPULATION: No  ACUPUNCTURE TREATMENTS: No  DEEP TISSUE MASSAGE THERAPY: Yes, helps temporarily  OSTEOPATHIC MANIPULATION THERAPY: No  INJECTIONS: No  EMG/NCS: No    IMAGING: Yes, LS xray 8/2018 L5-S1 disc space narrowing, no knee or neck imaging in our system.    FUNCTIONAL HISTORY: The patient is independent in all ADLs, mobility, and driving. The patient does not use any assistive device.    SOCIAL HISTORY:  Lives in: [facility]  Lives with: Spouse  Occupation: Clergy  Smoking:No  Alcohol: No  Drugs: No    ____________  ROS: The patient denies any bowel or bladder incontinence/accidents, night sweats, fevers, chills, recent significant weight loss. A 14 point review of systems was reviewed with the patient and is as above and otherwise negative. ROS questionnaire positive for joint pain, back pain, sleep       Physical Exam  GEN - Alert, well-developed, well-nourished, no apparent distress  PSYCH - Cooperative, appropriate mood and affect  HEENT - NC/AT  RESP - Non-labored respirations, equal expansion  CV - well-perfused, No cyanosis or edema in extremities.   ABD- soft, ND  SKIN - No rash.    Positive Yolette and Gaenslen test on the left, significant tenderness over the left SI joint and surrounding paraspinal and gluteal musculature.  Hip range of motion itself was okay and did not reproduce hip pain.    Previous BACK/SPINE - Symmetric posture, no erythema, edema, or swelling. Full lumbar range of motion without provocation of pain symptoms. There was tenderness over both SI joints significantly, as well as bilateral gluteal muscles. No significant tenderness over the greater trochanteric bursa areas. Mild tenderness over the lumbar paraspinal muscles.. Straight leg raise negative bilaterally.  Sitting slump test negative bilaterally. Femoral stretch test negative bilaterally.     Previous HIPS/PELVIS - Symmetric in standing and lying Passive hip flexion, internal rotation, and external rotation within functional normal limits bilaterally without provocation of pain symptoms. No tenderness over iliopsoas tendon.positive FABERs on the left, positive Gaenslen's on the right. Negative hip clicking. Negative hip impingement test. Negative log roll. Negative resisted active SLR. No pain with deep hip flexion.patient was not able to do single leg sit to stand on either side    Previous NEURO - UE strength 5/5 - including shoulder abduction, biceps, triceps, wrist extensors, finger flexors, interossei, and    LE strength 5/5 - including hip flexors, knee flexors, knee extensors, ankle dorsiflexors, ankle plantar flexors, and EHL   Sensation - intact to light touch in bilateral lower and upper extremities.   Reflexes - diminished but equal biceps, brachioradialis, triceps, patellar and Achilles reflexes bilaterally No Clonus, No Babinski, Renee's negative bilaterally  GAIT - Normal base, normal stride length, non-antalgic. Able to toe walk and heel walk without difficulty.

## 2024-02-12 NOTE — PATIENT INSTRUCTIONS
-Prednisone taper ordered  -Do not take meloxicam while on prednisone, but if you still have pain after the prednisone taper then you can restart meloxicam daily with food  -Increase Topamax to 100 mg twice a day.  Do so slowly by increasing 50 mg at a time every week or 2.  -Continue Robaxin up to 4 times daily as needed,  -Consider other medications in the future including Lyrica, Cymbalta, nortriptyline  -Consider knee injection  in the future if needed  -Continue daily home exercises  -Do not wear SIJ brace for more than 2 to 3 hours/day.  -Proceed with repeat MRI.  -Consider repeat ultrasound-guided SI joint injection in the future once this new pain calms down and then if it helps, then consider cornerloc or transloc procedures with Dr. Artis versus iFuse procedure (with Dr. Matute).   -Follow-up after MRI

## 2024-02-13 ENCOUNTER — OFFICE VISIT (OUTPATIENT)
Dept: PHYSICAL MEDICINE AND REHAB | Facility: CLINIC | Age: 64
End: 2024-02-13
Payer: COMMERCIAL

## 2024-02-13 VITALS
TEMPERATURE: 97.2 F | SYSTOLIC BLOOD PRESSURE: 148 MMHG | DIASTOLIC BLOOD PRESSURE: 93 MMHG | HEART RATE: 78 BPM | WEIGHT: 143 LBS | BODY MASS INDEX: 22.98 KG/M2 | HEIGHT: 66 IN | OXYGEN SATURATION: 100 %

## 2024-02-13 DIAGNOSIS — M54.42 LOW BACK PAIN WITH LEFT-SIDED SCIATICA, UNSPECIFIED BACK PAIN LATERALITY, UNSPECIFIED CHRONICITY: ICD-10-CM

## 2024-02-13 DIAGNOSIS — M46.1 SACROILIITIS (CMS-HCC): ICD-10-CM

## 2024-02-13 DIAGNOSIS — G89.29 CHRONIC THORACIC BACK PAIN, UNSPECIFIED BACK PAIN LATERALITY: ICD-10-CM

## 2024-02-13 DIAGNOSIS — M41.9 SCOLIOSIS, UNSPECIFIED SCOLIOSIS TYPE, UNSPECIFIED SPINAL REGION: ICD-10-CM

## 2024-02-13 DIAGNOSIS — M17.11 PRIMARY OSTEOARTHRITIS OF RIGHT KNEE: ICD-10-CM

## 2024-02-13 DIAGNOSIS — M46.1 INFLAMMATION OF SACROILIAC JOINT (CMS-HCC): ICD-10-CM

## 2024-02-13 DIAGNOSIS — M79.602 CHRONIC PAIN OF LEFT UPPER EXTREMITY: ICD-10-CM

## 2024-02-13 DIAGNOSIS — G89.29 CHRONIC LOW BACK PAIN, UNSPECIFIED BACK PAIN LATERALITY, UNSPECIFIED WHETHER SCIATICA PRESENT: ICD-10-CM

## 2024-02-13 DIAGNOSIS — G89.29 CHRONIC PAIN OF RIGHT KNEE: ICD-10-CM

## 2024-02-13 DIAGNOSIS — M54.6 CHRONIC THORACIC BACK PAIN, UNSPECIFIED BACK PAIN LATERALITY: ICD-10-CM

## 2024-02-13 DIAGNOSIS — M54.50 CHRONIC LOW BACK PAIN, UNSPECIFIED BACK PAIN LATERALITY, UNSPECIFIED WHETHER SCIATICA PRESENT: ICD-10-CM

## 2024-02-13 DIAGNOSIS — G89.29 CHRONIC PAIN OF LEFT UPPER EXTREMITY: ICD-10-CM

## 2024-02-13 DIAGNOSIS — M54.16 LUMBAR RADICULOPATHY: Primary | ICD-10-CM

## 2024-02-13 DIAGNOSIS — M25.561 CHRONIC PAIN OF RIGHT KNEE: ICD-10-CM

## 2024-02-13 DIAGNOSIS — M79.18 MYOFASCIAL PAIN: ICD-10-CM

## 2024-02-13 DIAGNOSIS — Z98.890 HISTORY OF LUMBAR SURGERY: ICD-10-CM

## 2024-02-13 DIAGNOSIS — M53.3 SACROILIAC JOINT DYSFUNCTION OF BOTH SIDES: ICD-10-CM

## 2024-02-13 PROCEDURE — 99214 OFFICE O/P EST MOD 30 MIN: CPT | Performed by: PHYSICAL MEDICINE & REHABILITATION

## 2024-02-13 PROCEDURE — 1036F TOBACCO NON-USER: CPT | Performed by: PHYSICAL MEDICINE & REHABILITATION

## 2024-02-13 RX ORDER — SCOLOPAMINE TRANSDERMAL SYSTEM 1 MG/1
PATCH, EXTENDED RELEASE TRANSDERMAL
COMMUNITY
Start: 2024-02-08 | End: 2024-03-09

## 2024-02-13 RX ORDER — PREDNISONE 20 MG/1
TABLET ORAL
Qty: 30 TABLET | Refills: 0 | Status: SHIPPED | OUTPATIENT
Start: 2024-02-13 | End: 2024-02-28

## 2024-02-13 RX ORDER — MELOXICAM 15 MG/1
15 TABLET ORAL AS NEEDED
Qty: 30 TABLET | Refills: 2 | Status: SHIPPED | OUTPATIENT
Start: 2024-02-13 | End: 2024-04-13

## 2024-02-13 RX ORDER — TOPIRAMATE 100 MG/1
100 TABLET, FILM COATED ORAL 2 TIMES DAILY
Qty: 60 TABLET | Refills: 3 | Status: SHIPPED | OUTPATIENT
Start: 2024-02-13 | End: 2024-06-12

## 2024-02-13 ASSESSMENT — PAIN SCALES - GENERAL: PAINLEVEL: 5

## 2024-03-04 ENCOUNTER — OFFICE VISIT (OUTPATIENT)
Dept: OPHTHALMOLOGY | Facility: CLINIC | Age: 64
End: 2024-03-04
Payer: COMMERCIAL

## 2024-03-04 DIAGNOSIS — H43.811 POSTERIOR VITREOUS DETACHMENT OF RIGHT EYE: Primary | ICD-10-CM

## 2024-03-04 DIAGNOSIS — H53.19 VITREOUS FLASHES OF BOTH EYES: ICD-10-CM

## 2024-03-04 DIAGNOSIS — H52.223 REGULAR ASTIGMATISM OF BOTH EYES: ICD-10-CM

## 2024-03-04 DIAGNOSIS — H52.4 PRESBYOPIA: ICD-10-CM

## 2024-03-04 DIAGNOSIS — H52.03 HYPERMETROPIA OF BOTH EYES: ICD-10-CM

## 2024-03-04 PROCEDURE — 92015 DETERMINE REFRACTIVE STATE: CPT | Performed by: OPTOMETRIST

## 2024-03-04 PROCEDURE — 99214 OFFICE O/P EST MOD 30 MIN: CPT | Performed by: OPTOMETRIST

## 2024-03-04 ASSESSMENT — ENCOUNTER SYMPTOMS
MUSCULOSKELETAL NEGATIVE: 0
ALLERGIC/IMMUNOLOGIC NEGATIVE: 0
NEUROLOGICAL NEGATIVE: 0
HEMATOLOGIC/LYMPHATIC NEGATIVE: 0
EYES NEGATIVE: 0
CONSTITUTIONAL NEGATIVE: 0
GASTROINTESTINAL NEGATIVE: 0
CARDIOVASCULAR NEGATIVE: 0
RESPIRATORY NEGATIVE: 0
ENDOCRINE NEGATIVE: 0
PSYCHIATRIC NEGATIVE: 0

## 2024-03-04 ASSESSMENT — CONF VISUAL FIELD
OD_INFERIOR_NASAL_RESTRICTION: 0
OS_NORMAL: 1
OD_INFERIOR_TEMPORAL_RESTRICTION: 0
METHOD: COUNTING FINGERS
OD_SUPERIOR_NASAL_RESTRICTION: 0
OS_SUPERIOR_NASAL_RESTRICTION: 0
OS_INFERIOR_TEMPORAL_RESTRICTION: 0
OD_NORMAL: 1
OS_INFERIOR_NASAL_RESTRICTION: 0
OS_SUPERIOR_TEMPORAL_RESTRICTION: 0
OD_SUPERIOR_TEMPORAL_RESTRICTION: 0

## 2024-03-04 ASSESSMENT — CUP TO DISC RATIO
OS_RATIO: .3
OD_RATIO: .3

## 2024-03-04 ASSESSMENT — REFRACTION
OD_SPHERE: +0.25
OS_AXIS: 005
OS_CYLINDER: -0.75
OD_AXIS: 080
OD_CYLINDER: -0.50
OD_ADD: +2.50
OS_ADD: +2.50
OS_SPHERE: +1.00

## 2024-03-04 ASSESSMENT — REFRACTION_MANIFEST
OD_AXIS: 080
OS_CYLINDER: -0.75
OS_AXIS: 005
OD_CYLINDER: -0.50
OS_ADD: +2.50
OD_ADD: +2.50
OD_SPHERE: +0.25
OS_SPHERE: +1.00

## 2024-03-04 ASSESSMENT — EXTERNAL EXAM - RIGHT EYE: OD_EXAM: NORMAL

## 2024-03-04 ASSESSMENT — EXTERNAL EXAM - LEFT EYE: OS_EXAM: NORMAL

## 2024-03-04 ASSESSMENT — REFRACTION_WEARINGRX
OS_CYLINDER: -0.75
OS_SPHERE: +0.75
OS_AXIS: 019
OD_CYLINDER: -0.50
OD_SPHERE: PLANO
OD_AXIS: 090

## 2024-03-04 ASSESSMENT — SLIT LAMP EXAM - LIDS
COMMENTS: GOOD POSITION
COMMENTS: GOOD POSITION

## 2024-03-04 ASSESSMENT — TONOMETRY
OS_IOP_MMHG: 19
IOP_METHOD: GOLDMANN APPLANATION
OD_IOP_MMHG: 17

## 2024-03-04 NOTE — Clinical Note
Hello,  Patient reports semi-persistent flashing OD, OS. Ocular health is WNL OD, OS. There is no sign of any retinal defect. Pt reports that sometimes BP will be elevated during episodes of chronic pain. I have previously had patients with uncontrolled HTN who presented with symptoms of periodic flashing lights without any ocular disease correlation. I recommended she follow up with you for a check of BP.  Thank you!  -Cher

## 2024-03-04 NOTE — PROGRESS NOTES
Assessment/Plan   Diagnoses and all orders for this visit:  Posterior vitreous detachment of right eye  Vitreous flashes of both eyes  Discussed signs and symtpoms of retinal hole, tear, detachment. Patient educated that retinal detachment can lead to permanent vision loss. Patient consents to return if they notice new floaters, flashes, curtain or veil covering vision. Discussed. Patient does note that BP may be high during episodes of chronic pain. Recommend monitor BP at home and discuss w/ PCP. Possible that uncontrolled HTN may contribute to symptoms. Pt voiced understanding.     Regular astigmatism of both eyes  Presbyopia  Hypermetropia of both eyes  New spec rx released today per patient request. Ocular health wnl for age OU. Monitor 1 year or sooner prn. Refraction billed today.

## 2024-03-07 ENCOUNTER — HOSPITAL ENCOUNTER (OUTPATIENT)
Dept: RADIOLOGY | Facility: CLINIC | Age: 64
Discharge: HOME | End: 2024-03-07
Payer: COMMERCIAL

## 2024-03-07 DIAGNOSIS — Z12.31 ENCOUNTER FOR SCREENING MAMMOGRAM FOR MALIGNANT NEOPLASM OF BREAST: ICD-10-CM

## 2024-03-07 PROCEDURE — 77063 BREAST TOMOSYNTHESIS BI: CPT | Performed by: RADIOLOGY

## 2024-03-07 PROCEDURE — 77063 BREAST TOMOSYNTHESIS BI: CPT

## 2024-03-07 PROCEDURE — 77067 SCR MAMMO BI INCL CAD: CPT | Performed by: RADIOLOGY

## 2024-03-19 ENCOUNTER — OFFICE VISIT (OUTPATIENT)
Dept: PHYSICAL MEDICINE AND REHAB | Facility: CLINIC | Age: 64
End: 2024-03-19
Payer: COMMERCIAL

## 2024-03-19 ENCOUNTER — HOSPITAL ENCOUNTER (OUTPATIENT)
Dept: RADIOLOGY | Facility: CLINIC | Age: 64
Discharge: HOME | End: 2024-03-19
Payer: COMMERCIAL

## 2024-03-19 VITALS
SYSTOLIC BLOOD PRESSURE: 133 MMHG | BODY MASS INDEX: 23.46 KG/M2 | TEMPERATURE: 97 F | DIASTOLIC BLOOD PRESSURE: 89 MMHG | WEIGHT: 146 LBS | HEIGHT: 66 IN | OXYGEN SATURATION: 99 % | HEART RATE: 72 BPM

## 2024-03-19 DIAGNOSIS — G89.29 CHRONIC LOW BACK PAIN, UNSPECIFIED BACK PAIN LATERALITY, UNSPECIFIED WHETHER SCIATICA PRESENT: ICD-10-CM

## 2024-03-19 DIAGNOSIS — M54.16 LUMBAR RADICULOPATHY: ICD-10-CM

## 2024-03-19 DIAGNOSIS — M79.18 MYOFASCIAL PAIN: ICD-10-CM

## 2024-03-19 DIAGNOSIS — M53.3 SACROILIAC JOINT DYSFUNCTION OF BOTH SIDES: ICD-10-CM

## 2024-03-19 DIAGNOSIS — G89.29 CHRONIC PAIN OF LEFT UPPER EXTREMITY: ICD-10-CM

## 2024-03-19 DIAGNOSIS — M25.561 CHRONIC PAIN OF RIGHT KNEE: ICD-10-CM

## 2024-03-19 DIAGNOSIS — M41.9 SCOLIOSIS, UNSPECIFIED SCOLIOSIS TYPE, UNSPECIFIED SPINAL REGION: ICD-10-CM

## 2024-03-19 DIAGNOSIS — G89.29 CHRONIC THORACIC BACK PAIN, UNSPECIFIED BACK PAIN LATERALITY: ICD-10-CM

## 2024-03-19 DIAGNOSIS — M54.2 NECK PAIN: Primary | ICD-10-CM

## 2024-03-19 DIAGNOSIS — M46.1 INFLAMMATION OF SACROILIAC JOINT (CMS-HCC): ICD-10-CM

## 2024-03-19 DIAGNOSIS — M79.602 CHRONIC PAIN OF LEFT UPPER EXTREMITY: ICD-10-CM

## 2024-03-19 DIAGNOSIS — M54.6 CHRONIC THORACIC BACK PAIN, UNSPECIFIED BACK PAIN LATERALITY: ICD-10-CM

## 2024-03-19 DIAGNOSIS — M54.2 NECK PAIN: ICD-10-CM

## 2024-03-19 DIAGNOSIS — Z98.890 HISTORY OF LUMBAR SURGERY: ICD-10-CM

## 2024-03-19 DIAGNOSIS — M17.11 PRIMARY OSTEOARTHRITIS OF RIGHT KNEE: ICD-10-CM

## 2024-03-19 DIAGNOSIS — M54.50 CHRONIC LOW BACK PAIN, UNSPECIFIED BACK PAIN LATERALITY, UNSPECIFIED WHETHER SCIATICA PRESENT: ICD-10-CM

## 2024-03-19 DIAGNOSIS — G89.29 CHRONIC PAIN OF RIGHT KNEE: ICD-10-CM

## 2024-03-19 DIAGNOSIS — M54.42 LOW BACK PAIN WITH LEFT-SIDED SCIATICA, UNSPECIFIED BACK PAIN LATERALITY, UNSPECIFIED CHRONICITY: ICD-10-CM

## 2024-03-19 DIAGNOSIS — M46.1 SACROILIITIS (CMS-HCC): ICD-10-CM

## 2024-03-19 PROCEDURE — 72050 X-RAY EXAM NECK SPINE 4/5VWS: CPT | Performed by: RADIOLOGY

## 2024-03-19 PROCEDURE — 99214 OFFICE O/P EST MOD 30 MIN: CPT | Performed by: PHYSICAL MEDICINE & REHABILITATION

## 2024-03-19 PROCEDURE — 1036F TOBACCO NON-USER: CPT | Performed by: PHYSICAL MEDICINE & REHABILITATION

## 2024-03-19 PROCEDURE — 72040 X-RAY EXAM NECK SPINE 2-3 VW: CPT

## 2024-03-19 PROCEDURE — 20553 NJX 1/MLT TRIGGER POINTS 3/>: CPT | Performed by: PHYSICAL MEDICINE & REHABILITATION

## 2024-03-19 ASSESSMENT — PAIN SCALES - GENERAL: PAINLEVEL: 7

## 2024-03-19 NOTE — PATIENT INSTRUCTIONS
-Ice on and off for the next 24 hours if injection sites are sore. Do gentle range of motion exercises in each area that was injected. Try to do them every hour for about half a minute or so, in every direction that the affected part goes. No pool, bath, or hot tub today. Avoid heavy lifting for the next 2 days.    -Continue Topamax 100 mg twice a day  -Continue Robaxin and meloxicam as needed   -Consider other medications in the future including Lyrica, Cymbalta, nortriptyline  -Consider knee injection  in the future if needed  -Continue daily home exercises  -Do not wear SIJ brace for more than 2 to 3 hours/day.  -Proceed with repeat MRI  That Dr. Artis ordered  -Consider repeat ultrasound-guided SI joint injection in the future once this new pain calms down and then if it helps, then consider cornerloc or transloc procedures with Dr. Artis versus iFuse procedure (with Dr. Matute).   -Neck x-ray ordered, consider neck PT and MRI in the future  -Follow-up after MRI as scheduled

## 2024-03-19 NOTE — PROGRESS NOTES
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 Physical exam is reassuring for lack of neurological compromise. Symptoms and physical exam findings in this complex patient and her of unclear etiology at the moment, but most likely a result of sacroiliac joint dysfunction and reactive myofascial pain/tension, possibly stemming from right knee osteoarthritis. However, lumbar radiculopathy and spondylosis are on the differential, as well as referral from ovarian distention into the posterior right knee, given her history of cervical cancer in the past.    -Right upper back and neck trigger point injections performed as above.  There were no complications and she tolerated the procedure well.  She was provided with postprocedure instructions.  -Continue Topamax 100 mg twice a day  -Continue Robaxin and meloxicam as needed   -Consider other medications in the future including Lyrica, Cymbalta, nortriptyline  -Consider knee injection  in the future if needed  -Continue daily home exercises  -Do not wear SIJ brace for more than 2 to 3 hours/day.  -Proceed with repeat MRI  That Dr. Artis ordered  -Consider repeat ultrasound-guided SI joint injection in the future once this new pain calms down and then if it helps, then consider cornerloc or transloc procedures with Dr. Artis versus iFuse procedure (with Dr. Matute).   -Neck x-ray ordered, consider neck PT and MRI in the future  -Follow-up after MRI as scheduled    The patient expressed understanding and agreement with the assessment and plan. Patient encouraged to contact us should they have any questions, concerns, or any changes in symptoms.     Thank you for allowing me to participate in the care of your patient.      ** Dictated with voice  "recognition software, please forgive any errors in grammar and/or spelling **      Chief Complaint    Follow up on back, bilateral shoulder and right knee pain      History of Present Illness    This is a pleasant 63-year-old right-handed woman with past medical history significant for BPPV, HLD, cervical carcinoma in situ 2012 s/p hysterectomy, who presents for follow-up chronic back, shoulder, and right knee pain.    She was last seen here on 2/13/2024, at which point I gave her a prednisone taper. This helped with the pain going down the medial thigh on the left, but still L lower back, flank, buttock pain groin pain.    I advised her to increase Topamax, resume meloxicam after prednisone and continue Robaxin as needed. Going up on topamax somewhat helped as well, \"taken edge off\".    Advised to continue daily exercises.      I advised her to proceed with the repeat lumbar MRI that Dr. Artis ordered, this is coming up in April.    She messaged me last night saying that she was having ongoing right upper shoulder and neck pain for the past week, suddenly, no inciting or traumatic event, started feeling intermittent pain in the neck and R upper back, radiating down lateral upper arm to elbow. Worse with cervical extension and side bend to the right. No new pillow. No neck imaging, never before. No N/T, symptoms in the fingers.    She rates her pain as a 7/10, previously 5/10.    Otherwise, there've been no changes to her medications or past medical history since the last visit.    ___________________  4/1/2020: Continue home exercises, take meloxicam as needed, Voltaren gel as needed, follow up as needed  12/7/2020: Thoracolumbar trigger point injections, resume exercises, try diclofenac instead of meloxicam and ibuprofen, try Robaxin instead of Skelaxin  1/11/2021: Start gabapentin, continue home exercises, consider MRI, follow-up in 2 months  5/10/2021: Continue exercises, continue diclofenac and Robaxin as " needed, consider other medications in the future, consider injections, avoid sitting crosslegged  8/23/2021: Continue exercises, medications as needed, consider MRIs and injections in the future  2/21/2022: Lumbar and thoracic MRI ordered, continue exercises  5/10/2022: Thoracic and lumbar MRI reviewed,, thoracic trigger point injections, referral for lumbar MJ versus facet block/RFA, continue exercises, meloxicam instead of diclofenac, continue Robaxin.  6/14/2022: Core exercises, continue medications as needed, consider repeat injections  8/29/2022: Continue medications as needed, daily home exercises focusing on active strengthening, consider repeat injections with Dr. Artis if needed  2/27/2023: Core exercises provided, continue meloxicam and Robaxin as needed, consider other medications, consider ultrasound-guided SI joint injections  6/5/2023: continue core exercises, continue meloxicam and Robaxin as needed, topamax 50 mg daily consider other medications, consider ultrasound-guided SI joint injections  10/13/2023: Left ultrasound-guided SI joint injection, bilateral thoracic trigger point injections, continue medications and exercises.  11/21/23: Continue medications, exercises, SI joint brace ordered, had a Licart provided, consider other SI joint options, follow-up for another injection in the meantime  12/5/2023: left US guided SIJ injection. Continue medications, exercises, SI joint brace ordered, had a Licart provided, consider other SI joint options  2/13/24: Prednisone taper, increase Topamax, restart meloxicam, continue exercises, do not wear brace for more than 2 to 3 hours, proceed with MRI, follow-up for tremor  3/19/24: same day visit, right upper back and neck trigger point injections, continue medications, proceed with repeat MRI, consider neck MRI, continue exercises, neck x-ray ordered  _______________  As a reminder:    TIMELINE OF COMPLAINT(S):  The patient had back surgery back in 2002,  "she is not sure what level, may be something involving L4, and she is not sure if there was fusion. This was for \"sciatica or arthritis\", and at the time she had tingling and weakness during down her right foot. Surgery helped, and she had no issues until this past June 2019. There are x-rays in June 2018, but the patient is adamant that it only started in 2019. She said that she was doing well from 2002 until 2019. At some point in the middle, about 6 years ago, she was told that she has scoliosis, but she had no main issues.     When she started feeling the pain come back in June 2019, she had some x-rays done and was told that her \"bones were deteriorating\". Then a few months later in August, she fell when she missed a step as she ran down the stairs, and landed on the top of her right hand, and since then it cramps up with certain movements, like when she wipes herself. The back of her knee also started hurting in June 2019, and is worse with prolonged bending and then getting up to stand.     Also in the past month, she felt the tingling \"like warm ants tingling\" going from her lower back to her right shoulder blade. This past weekend she felt pain in both posterior shoulders and neck. The left side is fine now, but she still has a dull ache in the right posterior shoulder and neck area.    The low back is the most bothersome along with the right knee. She is also had tingling in the right foot since June .    Pain:  LOCATION- low back LSJ, L=R but alternates, L groin, bilateral posterior shoulder pain , post neck and right knee and thigh posteriorly  RADIATION- Down R upper arm to elbow. no pain below knee or elbow.  ASSOCIATED WITH- no falls  NUMBNESS/TINGLING- Yes, right foot  WEAKNESS- No  CONSTANT or INTERMITTENT- Intermittent  SEVERITY/QUANTITY- 8, sitting here now 6/10 in shoulder, but none in back  QUALITY- Dull, achy  EXACERBATED BY- Nothing  BETTER WITH- Cold or warm compress  TRIED- Naproxen doesn’t " help, flexeril doesn’t help just sleepy. , Tylenol Arthritis takes edge off, meloxicam hasn’t taken in while, medrol dose packs for respiratory issues and also helped with pain. No other meds for this    PHYSICAL THERAPY: Yes, last time was in 2002, does some HEP, no TENS  CHIROPRACTIC MANIPULATION: No  ACUPUNCTURE TREATMENTS: No  DEEP TISSUE MASSAGE THERAPY: Yes, helps temporarily  OSTEOPATHIC MANIPULATION THERAPY: No  INJECTIONS: No  EMG/NCS: No    IMAGING: Yes, LS xray 8/2018 L5-S1 disc space narrowing, no knee or neck imaging in our system.    FUNCTIONAL HISTORY: The patient is independent in all ADLs, mobility, and driving. The patient does not use any assistive device.    SOCIAL HISTORY:  L[facility] in: [facility]  Lives with: Spouse  Occupation: Clergy  Smoking:No  Alcohol: No  Drugs: No    ____________  ROS: The patient denies any bowel or bladder incontinence/accidents, night sweats, fevers, chills, recent significant weight loss. A 14 point review of systems was reviewed with the patient and is as above and otherwise negative. ROS questionnaire positive for muscle pain/tenderness/spasms, joint pain, back pain    Physical Exam  GEN - Alert, well-developed, well-nourished, no apparent distress  PSYCH - Cooperative, appropriate mood and affect  HEENT - NC/AT  RESP - Non-labored respirations, equal expansion  CV - well-perfused, No cyanosis or edema in extremities.   ABD- soft, ND  SKIN - No rash.    Positive Yolette and Gaenslen test on the left, significant tenderness over the left SI joint and surrounding paraspinal and gluteal musculature.  Hip range of motion itself was okay and did not reproduce hip pain.    Previous BACK/SPINE - Symmetric posture, no erythema, edema, or swelling. Full lumbar range of motion without provocation of pain symptoms. There was tenderness over both SI joints significantly, as well as bilateral gluteal muscles. No significant tenderness over the greater trochanteric bursa areas.  Mild tenderness over the lumbar paraspinal muscles.. Straight leg raise negative bilaterally. Sitting slump test negative bilaterally. Femoral stretch test negative bilaterally.     Previous HIPS/PELVIS - Symmetric in standing and lying Passive hip flexion, internal rotation, and external rotation within functional normal limits bilaterally without provocation of pain symptoms. No tenderness over iliopsoas tendon.positive FABERs on the left, positive Gaenslen's on the right. Negative hip clicking. Negative hip impingement test. Negative log roll. Negative resisted active SLR. No pain with deep hip flexion.patient was not able to do single leg sit to stand on either side    Previous NEURO - UE strength 5/5 - including shoulder abduction, biceps, triceps, wrist extensors, finger flexors, interossei, and    LE strength 5/5 - including hip flexors, knee flexors, knee extensors, ankle dorsiflexors, ankle plantar flexors, and EHL   Sensation - intact to light touch in bilateral lower and upper extremities.   Reflexes - diminished but equal biceps, brachioradialis, triceps, patellar and Achilles reflexes bilaterally No Clonus, No Babinski, Renee's negative bilaterally  GAIT - Normal base, normal stride length, non-antalgic. Able to toe walk and heel walk without difficulty.     PROCEDURE:    Lidocaine 1%- 5 cc's used, 0 cc's wasted  200mg/20mL (10mg/mL)  NDC 8632-1821-14  LOT [id number]  EXP 03/01/2025  Manuf: Hospira        Cervical and Thoracic trigger point injections:    Description of the Procedure: The procedure, risks and alternative treatments were discussed with the patient. After written informed consent was obtained, the trigger points in the cervical paraspinal,, posterior scalene, levator scapulae, upper trapezius, and rhomboid, teres muscles were palpated and marked. The skin was prepped three times with alcohol. Using a 27 gauge 1.5 inch needle, after negative aspiration, the trigger points in each muscle  were injected with a total of 6 cc's of 1% lidocaine, spread equally into 6 sites. Twitch responses were observed in the musculature. The patient tolerated the procedure well with no immediate complications or bleeding.      Plan:   1. The patient was instructed in post-procedural care.  2. The patient was asked to apply moist heat and or ice for the next 24 hours and to perform daily gentle stretching exercises.     -Ice on and off for the next 24 hours if injection sites are sore. Do gentle range of motion exercises in each area that was injected. Try to do them every hour for about half a minute or so, in every direction that the affected part goes. No pool, bath, or hot tub today. Avoid heavy lifting for the next 2 days.     Physical Exam  Pulmonary:

## 2024-03-20 ENCOUNTER — HOSPITAL ENCOUNTER (OUTPATIENT)
Dept: RADIOLOGY | Facility: CLINIC | Age: 64
Discharge: HOME | End: 2024-03-20
Payer: COMMERCIAL

## 2024-03-20 PROCEDURE — 72050 X-RAY EXAM NECK SPINE 4/5VWS: CPT

## 2024-03-21 ENCOUNTER — TELEPHONE (OUTPATIENT)
Dept: PHYSICAL MEDICINE AND REHAB | Facility: CLINIC | Age: 64
End: 2024-03-21
Payer: COMMERCIAL

## 2024-03-21 NOTE — TELEPHONE ENCOUNTER
----- Message from María Elena Keith MD sent at 3/21/2024 12:17 PM EDT -----   please let her know that her x-ray showed mild to moderate arthritis between C5 and C7 as expected.  She should continue with our plan and I will see her after the MRI.  Thank you  ----- Message -----  From: Interface, Radiology Results In  Sent: 3/21/2024   6:17 AM EDT  To: María Elena Keith MD

## 2024-04-04 ENCOUNTER — ANESTHESIA EVENT (OUTPATIENT)
Dept: RADIOLOGY | Facility: HOSPITAL | Age: 64
End: 2024-04-04
Payer: COMMERCIAL

## 2024-04-04 ENCOUNTER — HOSPITAL ENCOUNTER (OUTPATIENT)
Dept: RADIOLOGY | Facility: HOSPITAL | Age: 64
Discharge: HOME | End: 2024-04-04
Payer: COMMERCIAL

## 2024-04-04 ENCOUNTER — ANESTHESIA (OUTPATIENT)
Dept: RADIOLOGY | Facility: HOSPITAL | Age: 64
End: 2024-04-04
Payer: COMMERCIAL

## 2024-04-04 VITALS
HEART RATE: 54 BPM | DIASTOLIC BLOOD PRESSURE: 69 MMHG | OXYGEN SATURATION: 100 % | TEMPERATURE: 96.8 F | SYSTOLIC BLOOD PRESSURE: 124 MMHG

## 2024-04-04 DIAGNOSIS — M54.16 LUMBAR RADICULOPATHY: ICD-10-CM

## 2024-04-04 PROCEDURE — 2500000004 HC RX 250 GENERAL PHARMACY W/ HCPCS (ALT 636 FOR OP/ED): Performed by: NURSE ANESTHETIST, CERTIFIED REGISTERED

## 2024-04-04 PROCEDURE — 3700000001 HC GENERAL ANESTHESIA TIME - INITIAL BASE CHARGE

## 2024-04-04 PROCEDURE — A72195 CHG MRI, PELVIS, W/O CONTRAST: Performed by: ANESTHESIOLOGY

## 2024-04-04 PROCEDURE — 3700000002 HC GENERAL ANESTHESIA TIME - EACH INCREMENTAL 1 MINUTE

## 2024-04-04 PROCEDURE — A72195 CHG MRI, PELVIS, W/O CONTRAST: Performed by: NURSE ANESTHETIST, CERTIFIED REGISTERED

## 2024-04-04 PROCEDURE — 72195 MRI PELVIS W/O DYE: CPT

## 2024-04-04 PROCEDURE — 72195 MRI PELVIS W/O DYE: CPT | Performed by: RADIOLOGY

## 2024-04-04 RX ORDER — LIDOCAINE HYDROCHLORIDE 10 MG/ML
0.1 INJECTION, SOLUTION EPIDURAL; INFILTRATION; INTRACAUDAL; PERINEURAL ONCE
Status: DISCONTINUED | OUTPATIENT
Start: 2024-04-04 | End: 2024-04-05 | Stop reason: HOSPADM

## 2024-04-04 RX ORDER — MIDAZOLAM HYDROCHLORIDE 1 MG/ML
INJECTION INTRAMUSCULAR; INTRAVENOUS
Status: COMPLETED
Start: 2024-04-04 | End: 2024-04-04

## 2024-04-04 RX ORDER — PROPOFOL 10 MG/ML
INJECTION, EMULSION INTRAVENOUS CONTINUOUS PRN
Status: DISCONTINUED | OUTPATIENT
Start: 2024-04-04 | End: 2024-04-04

## 2024-04-04 RX ORDER — SODIUM CHLORIDE, SODIUM LACTATE, POTASSIUM CHLORIDE, CALCIUM CHLORIDE 600; 310; 30; 20 MG/100ML; MG/100ML; MG/100ML; MG/100ML
100 INJECTION, SOLUTION INTRAVENOUS CONTINUOUS
Status: DISCONTINUED | OUTPATIENT
Start: 2024-04-04 | End: 2024-04-05 | Stop reason: HOSPADM

## 2024-04-04 RX ORDER — MIDAZOLAM HYDROCHLORIDE 1 MG/ML
INJECTION INTRAMUSCULAR; INTRAVENOUS AS NEEDED
Status: DISCONTINUED | OUTPATIENT
Start: 2024-04-04 | End: 2024-04-04

## 2024-04-04 RX ORDER — ONDANSETRON HYDROCHLORIDE 2 MG/ML
4 INJECTION, SOLUTION INTRAVENOUS ONCE AS NEEDED
Status: DISCONTINUED | OUTPATIENT
Start: 2024-04-04 | End: 2024-04-05 | Stop reason: HOSPADM

## 2024-04-04 RX ORDER — SODIUM CHLORIDE, SODIUM LACTATE, POTASSIUM CHLORIDE, CALCIUM CHLORIDE 600; 310; 30; 20 MG/100ML; MG/100ML; MG/100ML; MG/100ML
INJECTION, SOLUTION INTRAVENOUS CONTINUOUS PRN
Status: DISCONTINUED | OUTPATIENT
Start: 2024-04-04 | End: 2024-04-04

## 2024-04-04 RX ORDER — PROPOFOL 10 MG/ML
INJECTION, EMULSION INTRAVENOUS
Status: DISPENSED
Start: 2024-04-04 | End: 2024-04-04

## 2024-04-04 RX ORDER — FENTANYL CITRATE 50 UG/ML
INJECTION, SOLUTION INTRAMUSCULAR; INTRAVENOUS
Status: COMPLETED
Start: 2024-04-04 | End: 2024-04-04

## 2024-04-04 RX ORDER — FENTANYL CITRATE 50 UG/ML
INJECTION, SOLUTION INTRAMUSCULAR; INTRAVENOUS AS NEEDED
Status: DISCONTINUED | OUTPATIENT
Start: 2024-04-04 | End: 2024-04-04

## 2024-04-04 RX ADMIN — SODIUM CHLORIDE, SODIUM LACTATE, POTASSIUM CHLORIDE, AND CALCIUM CHLORIDE: .6; .31; .03; .02 INJECTION, SOLUTION INTRAVENOUS at 08:35

## 2024-04-04 RX ADMIN — FENTANYL CITRATE 50 MCG: 50 INJECTION, SOLUTION INTRAMUSCULAR; INTRAVENOUS at 08:40

## 2024-04-04 RX ADMIN — MIDAZOLAM HYDROCHLORIDE 2 MG: 1 INJECTION, SOLUTION INTRAMUSCULAR; INTRAVENOUS at 08:36

## 2024-04-04 RX ADMIN — FENTANYL CITRATE 50 MCG: 50 INJECTION, SOLUTION INTRAMUSCULAR; INTRAVENOUS at 08:36

## 2024-04-04 NOTE — ANESTHESIA PREPROCEDURE EVALUATION
Patient: Lilli Aldridge    Procedure Information       Date/Time: 04/04/24 1817    Scheduled providers: Gil Herman MD; EVELYN Martinez-CRNA    Procedure: MR PELVIS WO CONTRAST    Location: [facility]            Relevant Problems   Anesthesia (within normal limits)      Cardiac   (+) Chronic thoracic back pain   (+) Hyperlipidemia   (+) Mixed hyperlipidemia      Pulmonary   (+) Mild persistent asthma without complication      Neuro   (+) Low back pain with left-sided sciatica   (+) Lumbar radiculopathy      GI   (+) Blood per rectum   (+) GERD (gastroesophageal reflux disease)   (+) Rectal hemorrhage      Musculoskeletal   (+) Chronic low back pain   (+) Primary osteoarthritis of right knee   (+) Scoliosis      HEENT   (+) Chronic ethmoidal sinusitis   (+) Other chronic sinusitis   (+) Sinus pressure      ID   (+) Disease due to severe acute respiratory syndrome coronavirus 2 (SARS-CoV-2)   (+) Onychomycosis       Clinical information reviewed:                   NPO Detail:  No data recorded     Physical Exam    Airway  Mallampati: II  TM distance: >3 FB  Neck ROM: full     Cardiovascular   Rhythm: regular  Rate: normal     Dental    Pulmonary - normal exam     Abdominal            Anesthesia Plan    History of general anesthesia?: yes  History of complications of general anesthesia?: no    ASA 2     MAC     Anesthetic plan and risks discussed with patient.    Plan discussed with CRNA.

## 2024-04-04 NOTE — ANESTHESIA POSTPROCEDURE EVALUATION
Patient: Lilli Aldridge    Procedure Summary       Date: 04/04/24 Room / Location: [facility]    Anesthesia Start: 0831 Anesthesia Stop: 0934    Procedure: MR PELVIS WO CONTRAST Diagnosis:       Lumbar radiculopathy      (pain)    Scheduled Providers: Gil Herman MD; EVELYN Martinez-CRNA Responsible Provider: Gil Herman MD    Anesthesia Type: MAC ASA Status: 2            Anesthesia Type: MAC    Vitals Value Taken Time   /74 04/04/24 0935   Temp 36.4 04/04/24 0935   Pulse 60 04/04/24 0935   Resp 14 04/04/24 0935   SpO2 99 04/04/24 0935       Anesthesia Post Evaluation    Patient location during evaluation: PACU  Patient participation: complete - patient participated  Level of consciousness: awake and awake and alert  Pain management: adequate  Airway patency: patent  Cardiovascular status: acceptable and stable  Respiratory status: acceptable  Hydration status: acceptable  Postoperative Nausea and Vomiting: none        No notable events documented.

## 2024-04-04 NOTE — ANESTHESIA PROCEDURE NOTES
Peripheral IV  Date/Time: 4/4/2024 8:32 AM  Inserted by: EVELYN Martinez-CRNA    Placement  Needle size: 22 G  Laterality: left  Location: antecubital  Site prep: alcohol  Technique: anatomical landmarks  Attempts: 2

## 2024-04-08 ENCOUNTER — OFFICE VISIT (OUTPATIENT)
Dept: PAIN MEDICINE | Facility: HOSPITAL | Age: 64
End: 2024-04-08
Payer: COMMERCIAL

## 2024-04-08 DIAGNOSIS — M46.1 SACROILIITIS (CMS-HCC): Primary | ICD-10-CM

## 2024-04-08 PROCEDURE — 99214 OFFICE O/P EST MOD 30 MIN: CPT | Performed by: ANESTHESIOLOGY

## 2024-04-08 NOTE — PROGRESS NOTES
"Pain Management Clinic Note     Chief Complaint: back pain     History Of Present Illness  Lilli Aldridge is a 64 y.o. female with a past medical history of HLD, scoliosis and BPPV who presents for follow-up of left back pain which radiates into her left groin.     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  She received a left L3 and L4 transforaminal epidural steroid injection without any relief.  Unfortunately she complains of the left anterior thigh burning since her injection. she also previously had an bilateral L5 transforaminal in 2022 without significant relief.  She follows with Dr. Keith for ultrasound-guided sacroiliac joint injections.  She has received 2 ultrasound-guided injections will provide relief but for only 4-6 weeks of relief. She would consider more definitive treatments.      Since last seen by us, she states that she has burning pain in her anterior left thigh since her injection.  She thinks that a nerve was hit.  She is frustrated that she is being with chronic pain and reports a \" pulling\" sensation in her groin.  She has both left SI pain that radiates into her groin. She is not able to elicit what makes her pain the worst.  She is currently taking Topamax which does help take some of the pain away.     Recent MRI of the pelvis does not reveal any significant findings or intra-articular hip pathologies.     The pain causes significant stress in the patient's life, specifically interferes with general activity, mood, walking ability, ability to perform tasks at home and/or work.  Patient participates in physical therapy and continues to perform physician directed exercises at home. Denies any bowel or bladder incontinence, saddle anesthesia, worsening pain, weakness or falls.     Past Medical History  She has a past medical history of Cervical high risk human papillomavirus (HPV) DNA test positive, Cough variant " asthma (11/25/2017), Lateral epicondylitis, unspecified elbow, Personal history of diseases of the blood and blood-forming organs and certain disorders involving the immune mechanism, Personal history of other diseases of the musculoskeletal system and connective tissue, Urge incontinence (09/16/2022), and Urinary tract infection, site not specified (09/15/2022).    Surgical History  She has a past surgical history that includes Other surgical history (11/09/2017); Mouth surgery (11/09/2017); Tonsillectomy (11/09/2017); Back surgery (07/06/2016); Hysterectomy (07/06/2016); and Tubal ligation (07/06/2016).     Social History  She reports that she has never smoked. She has never been exposed to tobacco smoke. She has never used smokeless tobacco. She reports that she does not drink alcohol and does not use drugs.    Family History  Family History   Problem Relation Name Age of Onset    Other (Diabetes mellitus) Mother      Hypertension Mother      Cataracts Mother      Glaucoma Mother      Other (Diabetes mellitus) Father      Hypertension Father      Liver cancer Father      Cataracts Father      Glaucoma Father      Decreased libido Sister          Allergies  Penicillins, Cat dander, Dog dander, and Erythromycin    Review of Symptoms:   Constitutional: Negative for chills, diaphoresis or fever  HENT: Negative for neck swelling  Eyes:.  Negative for eye pain  Respiratory:.  Negative for cough, shortness of breath or wheezing    Cardiovascular:.  Negative for chest pain or palpitations  Gastrointestinal:.  Negative for abdominal pain, nausea and vomiting  Genitourinary:.  Negative for urgency  Musculoskeletal:  Positive for back pain. Positive for joint pain. Denies falls within the past 3 months.  Skin: Negative for wounds or itching   Neurological: Negative for dizziness, seizures, loss of consciousness and weakness  Endo/Heme/Allergies: Does not bruise/bleed easily  Psychiatric/Behavioral: Negative for depression.  The patient does not appear anxious.       PHYSICAL EXAM  Vitals signs reviewed  Constitutional:       General: Not in acute distress     Appearance: Normal appearance. Not ill-appearing.  HENT:     Head: Normocephalic and atraumatic  Eyes:     Conjunctiva/sclera: Conjunctivae normal  Cardiovascular:     Rate and Rhythm: Normal rate and regular rhythm  Pulmonary:     Effort: No respiratory distress  Abdominal:     Palpations: Abdomen is soft  Musculoskeletal: DIAMOND  Skin:     General: Skin is warm and dry  Neurological:     General: No focal deficit present  Psychiatric:         Mood and Affect: Mood normal         Behavior: Behavior normal    Advanced Exam   Inspection: No gross deformities, no surgical scars  Palpation: TTP of lumbar midline, lumbar paraspinals, +bilateral SI joints  ROM: Normal range of motion of the lumbar flexion extension  Motor: 5/5 strength upper and lower extremities  Sensory: Negative for sensory abnormalities in upper and lower extremities to both pinprick and light touch   Reflexes: 2+ reflexes bilateral upper and lower extremities  Lumbar: Negative straight leg raising bilaterally, negative for facet loading  Sacral: Left SI with +gaenslan, +jenny, +ttp of facet joint, +compression, +distraction.   Hip: Negative for pain with anterior, lateral, posterior palpation of hip joints, negative FADIR     Last Recorded Vitals  There were no vitals taken for this visit.    Relevant Results  Current Outpatient Medications   Medication Instructions    acetaminophen (Tylenol 8 HOUR) 650 mg ER tablet 1 tablet, oral, 3-4 times daily as needed for pain    acetaminophen (TylenoL) 325 mg tablet 2 tablets, oral, Every 6 hours PRN    albuterol 90 mcg/actuation inhaler 2 puffs, inhalation, Every 6 hours PRN    calcium carbonate-vitamin D3 1,000 mg-20 mcg (800 unit) tablet Calcium 1000 + D 1000-800 MG-UNIT Oral Tablet Take 1 tablet daily Quantity: 0 Refills: 0 Ordered: 2-Sep-2021 Miracle Velasco MD Start :  2-Sep-2021 Active    chlorpheniramine (Chlor-Trimeton) 4 mg tablet 1 tablet, oral, Every 4-6 hours as needed.    flunisolide (Nasalide) 25 mcg (0.025 %) spray,non-aerosol 2 sprays, Each Nostril, 2 times daily    loratadine (Claritin) 10 mg tablet 1 tablet, oral, Daily    meclizine (Antivert) 12.5 mg tablet 1 tablet, oral, 3 times daily PRN    meloxicam (MOBIC) 15 mg, oral, As needed    methocarbamol (Robaxin) 500 mg tablet 1-2 tablets, oral, 4 times daily, As needed    omeprazole (PriLOSEC) 20 mg DR capsule 1 capsule, oral, Daily before breakfast    topiramate (TOPAMAX) 100 mg, oral, 2 times daily    vit C-Zn gluc-herbal no.325 (Elderberry Zinc Vit C) 90-15 mg lozenge 1 lozenge, oral, Daily         MR LUMBAR SPINE WO IV CONTRAST 04/21/2022    Narrative  MRN: [id number]  Patient Name: SILVANA MARTIN    STUDY:  MRI L-SPINE WO; MRI T-SPINE WO;  4/21/2022 11:27 am    INDICATION:  Ongoing thoracic and lumbar, RLE pain depsite conservative management  including PT, daily home exercises  M54.6: Thoracic back pain.    COMPARISON:  None.    ACCESSION NUMBER(S):  [id number]; [id number]    ORDERING CLINICIAN:  GEORGE PERSAUD    TECHNIQUE:  Sagittal T2, sagittal STIR, sagittal T1, axial T2, axial T1 weighted  MRI images of the lumbar spine were obtained without intravenous  contrast administration.    FINDINGS:  The coronal  images demonstrate a mild levocurvature of the  thoracic and lumbar spine.    There is minimal 1-2 mm of retrolisthesis of L2 on L3, L1 on L2, and  T12 on L1.    There are degenerative signal changes noted along endplates within  the thoracic and lumbar region.    The visualized spinal cord demonstrates no signal abnormality within  it.  The conus medullaris is normally positioned terminating at the  inferior L1 level.    There is a small perineural cyst/Tarlov cyst noted within the spinal  canal in sacral region the largest of which measures approximate 12  mm in greatest sagittal dimension at the S3 level as  seen on sagittal  slice 13 of 25.    At the L5/S1 level,  there is posterior osteophytic spurring along  with degenerative facet changes. There is mild encroachment upon the  ventral spinal canal without significant narrowing of the thecal sac  within the spinal canal. There is moderate to severe right and  moderate left-sided neural foraminal narrowing.    At the L4/L5 level,  there is a posterior disc bulge along with  hypertrophic degenerative facet changes and mild ligamentum flavum  hypertrophy contributing to mild overall narrowing of the thecal sac  within the spinal canal. There is moderate to severe bilateral neural  foraminal narrowing.    At the L3/L4 level,  there is a posterior disc bulge along with  degenerative facet changes and ligamentum flavum hypertrophy  contributing to mild overall narrowing of the thecal sac within the  spinal canal. There is moderate bilateral neural foraminal narrowing.    At the L2/L3 level,  there is 1-2 mm of retrolisthesis of L2 on L3,  posterior disc bulge, along with degenerative facet changes  contribute to mild spinal canal narrowing. There is mild-to-moderate  encroachment upon the neural foramen bilaterally.    At the L1/L2 level,  there is 1-2 mm of retrolisthesis of L1 on L2,  mild posterior osteophytic spurring and posterior disc bulge along  with mild degenerative facet changes contribute to mild encroachment  upon the spinal canal. There is mild encroachment upon the inferior  recesses of the neural foramen bilaterally.    At the T12/L1 level,  there is a mild posterior disc bulge and  degenerative facet changes contribute to mild encroachment on the  spinal canal. There is mild encroachment upon the inferior recess of  the right neural foramen. There is no significant left-sided neural  foraminal narrowing.    At the T11/12 level, there is a mild posterior disc bulge and  degenerative facet changes contribute to mild encroachment upon the  spinal canal. There  is a small perineural cyst/Tarlov cyst extending  through the right neural foramen.    At the T10/11 level, there is a minimal posterior disc osteophyte  complex along with mild degenerative facet changes contribute to mild  encroachment upon the spinal canal without spinal cord deformity.    At the T9/10 level, there are mild degenerative facet changes without  significant spinal canal narrowing.    At the T8/9 level, there are mild degenerative facet changes without  significant spinal canal narrowing.    At the T7/8 level, there is no significant spinal canal narrowing.    At the T6/7 level, there is a minimal posterior disc bulge without  significant spinal canal narrowing.    At the T5/6 level, there are mild degenerative facet changes without  significant spinal canal narrowing.    At the T4/5 level, there are mild degenerative facet changes without  significant spinal canal narrowing.    At the T3/4 level, there is a minimal posterior disc and/or disc  osteophyte complex and mild degenerative facet changes without  significant spinal canal narrowing.    At the T2/3 level, there is no significant spinal canal narrowing.    At the T1/2 level, there is no significant spinal canal narrowing.    At the C7/T1 level, there is a mild posterior disc/osteophyte complex  contribute to mild flattening of ventral subarachnoid space without  significant spinal canal narrowing or spinal cord deformity.    There are a few small subcentimeter foci of bright signal on the T2  weighted images noted within the left kidney raising the possibility  of small nonspecific cystic foci within the left kidney.    Impression  The coronal  images demonstrate a mild levocurvature of the  thoracic and lumbar spine.    There is minimal 1-2 mm of retrolisthesis of L2 on L3, L1 on L2, and  T12 on L1.    There is multilevel spondylosis within the thoracic and lumbar region  with varying degrees of spinal canal and neural foraminal  narrowing  as described above.    There are a few small subcentimeter foci of bright signal on the T2  weighted images noted within the left kidney raising the possibility  of small nonspecific cystic foci within the left kidney.    The study was interpreted at [facility].         ASSESSMENT/PLAN  Lilli Aldridge is a 64 y.o. female with a past medical history of HLD, scoliosis and BPPV who presents with left back pain which radiates into her left groin.   She received bilateral L5 transforaminal and most recently left L3 and L4 transforaminal without significant relief. Unfortunately had a new neuritis of left L3 in anterior thigh since her injection. She does get relief from US-guided left SI joint injections with Dr. Keith but only lasts 4-6 weeks. Physical exam notable for positive NEDA, Evan finger, Gaenslan, compression on the left as well as left paraspinal tenderness around L1-L3. Likely has a component of primarily left SI as well as pain due to foraminal stenosis. Hip MRI without significant findings. She has completed PT and has been taking topirimate with minimal relief of her pain. At this point it would be reasonable to pursue radiofrequency ablation of left sacroiliac joint.      Plan:  -Schedule for ablation of the lateral branches of the dorsal rami for left SI joint.   -Given information regarding SI fusion Transloc.   -Continue current pain medications including Topamax.  Explained to patient that she can increase her dose to 200 mg up to twice daily for her left L3 neuritis but she would like to stick to 100 mg twice daily.     The patient was invited to contact us back anytime with any questions or concerns and follow-up with us in the office as needed.      Stacy Kamara DO   Pain Fellow

## 2024-04-08 NOTE — PATIENT INSTRUCTIONS
-Ice on and off for the next 24 hours if injection sites are sore. Do gentle range of motion exercises in each area that was injected. Try to do them every hour for about half a minute or so, in every direction that the affected part goes. No pool, bath, or hot tub today. Avoid heavy lifting for the next 2 days.    -Continue Topamax 100 mg twice a day  -Continue Robaxin and meloxicam as needed   -Consider other medications in the future including Lyrica, Cymbalta, nortriptyline  -Consider knee injection  in the future if needed  -Continue daily home exercises  -Information about the anti-inflammatory lifestyle provided  -Do not wear SIJ brace for more than 2 to 3 hours/day.  -Proceed with SI joint RFA with Dr. Artis and consider cornerloc or transloc procedures with him versus iFuse procedure (with Dr. Matute).   - consider neck PT and MRI in the future  -Follow-up 2 to 3 months

## 2024-04-08 NOTE — PROGRESS NOTES
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 Physical exam is reassuring for lack of neurological compromise. Symptoms and physical exam findings in this complex patient and her of unclear etiology at the moment, but most likely a result of sacroiliac joint dysfunction and reactive myofascial pain/tension, possibly stemming from right knee osteoarthritis. However, lumbar radiculopathy and spondylosis are on the differential, as well as referral from ovarian distention into the posterior right knee, given her history of cervical cancer in the past.    -Left upper back and neck trigger point injections performed as above.  There were no complications and she tolerated the procedure well.  She was provided with postprocedure instructions.  -Continue Topamax 100 mg twice a day  -Continue Robaxin and meloxicam as needed   -Consider other medications in the future including Lyrica, Cymbalta, nortriptyline  -Consider knee injection  in the future if needed  -Continue daily home exercises  -Information about the anti-inflammatory lifestyle provided  -Do not wear SIJ brace for more than 2 to 3 hours/day.  -Proceed with SI joint RFA with Dr. Artis and consider cornerloc or transloc procedures with him versus iFuse procedure (with Dr. Matute).   - consider neck PT and MRI in the future  -Follow-up 2 to 3 months    The patient expressed understanding and agreement with the assessment and plan. Patient encouraged to contact us should they have any questions, concerns, or any changes in symptoms.     Thank you for allowing me to participate in the care of your patient.      ** Dictated with voice recognition software, please forgive any errors in grammar and/or spelling **      Chief Complaint    Follow up on back,  bilateral shoulder and right knee pain      History of Present Illness    This is a pleasant 64-year-old right-handed woman with past medical history significant for BPPV, HLD, cervical carcinoma in situ 2012 s/p hysterectomy, who presents for follow-up chronic back, shoulder, and right knee pain.    She was last seen here on 3/19/2024 as a same-day visit, at which point I did right upper back and neck trigger point injections. This helped 100% still lasting, but her left side started hurting about 5 days    I advised her to continue her medications.    I advised her to continue daily exercises.    I ordered a neck x-ray and this was done on 3/19/2024 and it showed mild to moderate multilevel cervical spondylosis especially at C5-C7.  Images and report personally reviewed and interpreted and discussed with the patient.    I advised her to proceed with a repeat MRI that Dr. Artis ordered.  [place] [facility] [place], personally reviewed and interpreted today and discussed with the patient.  IMPRESSION:  1.  Mild insertional tendinosis of bilateral gluteus medius tendons.  2. Mild bilateral trochanteric bursitis.  3. Mild bilateral hip osteoarthrosis.  4. Marked bilateral facet osteoarthropathy at L4-L5 and L5-S1 with  bilateral facet effusions at L4-L5. At least moderate grade bilateral  L4-5 osseous foraminal stenosis. Consider dedicated lumbar spine MRI  for further evaluation if clinically warranted.  5. Mild osteoarthrosis of the left sacroiliac joint.  6. Small amount of free fluid in the dependent pelvis, a nonspecific finding.      She saw Dr. Artis yesterday, note personally reviewed today. He will schedule her for the SIJ RFA (4/19/23) and he discussed the transloc procedure.    She rates her pain as a 8/10, previously 7/10.    Otherwise, there've been no changes to her medications or past medical history since the last visit.    ___________________  4/1/2020: Continue home exercises, take  meloxicam as needed, Voltaren gel as needed, follow up as needed  12/7/2020: Thoracolumbar trigger point injections, resume exercises, try diclofenac instead of meloxicam and ibuprofen, try Robaxin instead of Skelaxin  1/11/2021: Start gabapentin, continue home exercises, consider MRI, follow-up in 2 months  5/10/2021: Continue exercises, continue diclofenac and Robaxin as needed, consider other medications in the future, consider injections, avoid sitting crosslegged  8/23/2021: Continue exercises, medications as needed, consider MRIs and injections in the future  2/21/2022: Lumbar and thoracic MRI ordered, continue exercises  5/10/2022: Thoracic and lumbar MRI reviewed,, thoracic trigger point injections, referral for lumbar MJ versus facet block/RFA, continue exercises, meloxicam instead of diclofenac, continue Robaxin.  6/14/2022: Core exercises, continue medications as needed, consider repeat injections  8/29/2022: Continue medications as needed, daily home exercises focusing on active strengthening, consider repeat injections with Dr. Artis if needed  2/27/2023: Core exercises provided, continue meloxicam and Robaxin as needed, consider other medications, consider ultrasound-guided SI joint injections  6/5/2023: continue core exercises, continue meloxicam and Robaxin as needed, topamax 50 mg daily consider other medications, consider ultrasound-guided SI joint injections  10/13/2023: Left ultrasound-guided SI joint injection, bilateral thoracic trigger point injections, continue medications and exercises.  11/21/23: Continue medications, exercises, SI joint brace ordered, had a Licart provided, consider other SI joint options, follow-up for another injection in the meantime  12/5/2023: left US guided SIJ injection. Continue medications, exercises, SI joint brace ordered, had a Licart provided, consider other SI joint options  2/13/24: Prednisone taper, increase Topamax, restart meloxicam, continue exercises,  "do not wear brace for more than 2 to 3 hours, proceed with MRI, follow-up for tremor  3/19/24: same day visit, right upper back and neck trigger point injections, continue medications, proceed with repeat MRI, consider neck MRI, continue exercises, neck x-ray ordered   4/9/2024: Left upper back and neck trigger point injections, continue medications,  consider neck MRI, continue exercises, proceed with SI joint procedures with Dr. Artis  _______________  As a reminder:    TIMELINE OF COMPLAINT(S):  The patient had back surgery back in 2002, she is not sure what level, may be something involving L4, and she is not sure if there was fusion. This was for \"sciatica or arthritis\", and at the time she had tingling and weakness during down her right foot. Surgery helped, and she had no issues until this past June 2019. There are x-rays in June 2018, but the patient is adamant that it only started in 2019. She said that she was doing well from 2002 until 2019. At some point in the middle, about 6 years ago, she was told that she has scoliosis, but she had no main issues.     When she started feeling the pain come back in June 2019, she had some x-rays done and was told that her \"bones were deteriorating\". Then a few months later in August, she fell when she missed a step as she ran down the stairs, and landed on the top of her right hand, and since then it cramps up with certain movements, like when she wipes herself. The back of her knee also started hurting in June 2019, and is worse with prolonged bending and then getting up to stand.     Also in the past month, she felt the tingling \"like warm ants tingling\" going from her lower back to her right shoulder blade. This past weekend she felt pain in both posterior shoulders and neck. The left side is fine now, but she still has a dull ache in the right posterior shoulder and neck area.    The low back is the most bothersome along with the right knee. She is also had " tingling in the right foot since June .    Pain:  LOCATION- low back LSJ, L=R but alternates, L groin, bilateral posterior shoulder pain , post neck and right knee and thigh posteriorly  RADIATION- Down R upper arm to elbow. no pain below knee or elbow.  ASSOCIATED WITH- no falls  NUMBNESS/TINGLING- Yes, right foot  WEAKNESS- No  CONSTANT or INTERMITTENT- Intermittent  SEVERITY/QUANTITY- 8, sitting here now 6/10 in shoulder, but none in back  QUALITY- Dull, achy  EXACERBATED BY- Nothing  BETTER WITH- Cold or warm compress  TRIED- Naproxen doesn’t help, flexeril doesn’t help just sleepy. , Tylenol Arthritis takes edge off, meloxicam hasn’t taken in while, medrol dose packs for respiratory issues and also helped with pain. No other meds for this    PHYSICAL THERAPY: Yes, last time was in 2002, does some HEP, no TENS  CHIROPRACTIC MANIPULATION: No  ACUPUNCTURE TREATMENTS: No  DEEP TISSUE MASSAGE THERAPY: Yes, helps temporarily  OSTEOPATHIC MANIPULATION THERAPY: No  INJECTIONS: No  EMG/NCS: No    IMAGING: Yes, LS xray 8/2018 L5-S1 disc space narrowing, no knee or neck imaging in our system.    FUNCTIONAL HISTORY: The patient is independent in all ADLs, mobility, and driving. The patient does not use any assistive device.    SOCIAL HISTORY:  L[facility] in: [facility]  Lives with: Spouse  Occupation: Clergy  Smoking:No  Alcohol: No  Drugs: No    ____________  ROS: The patient denies any bowel or bladder incontinence/accidents, night sweats, fevers, chills, recent significant weight loss. A 14 point review of systems was reviewed with the patient and is as above and otherwise negative. ROS questionnaire positive for muscle pain/tenderness, joint pain, back pain    Physical Exam  GEN - Alert, well-developed, well-nourished, no apparent distress  PSYCH - Cooperative, appropriate mood and affect  HEENT - NC/AT  RESP - Non-labored respirations, equal expansion  CV - well-perfused, No cyanosis or edema in extremities.   ABD-  soft, ND  SKIN - No rash.    Positive Yolette and Gaenslen test on the left, significant tenderness over the left SI joint and surrounding paraspinal and gluteal musculature.  Hip range of motion itself was okay and did not reproduce hip pain.    Previous BACK/SPINE - Symmetric posture, no erythema, edema, or swelling. Full lumbar range of motion without provocation of pain symptoms. There was tenderness over both SI joints significantly, as well as bilateral gluteal muscles. No significant tenderness over the greater trochanteric bursa areas. Mild tenderness over the lumbar paraspinal muscles.. Straight leg raise negative bilaterally. Sitting slump test negative bilaterally. Femoral stretch test negative bilaterally.     Previous HIPS/PELVIS - Symmetric in standing and lying Passive hip flexion, internal rotation, and external rotation within functional normal limits bilaterally without provocation of pain symptoms. No tenderness over iliopsoas tendon.positive FABERs on the left, positive Gaenslen's on the right. Negative hip clicking. Negative hip impingement test. Negative log roll. Negative resisted active SLR. No pain with deep hip flexion.patient was not able to do single leg sit to stand on either side    Previous NEURO - UE strength 5/5 - including shoulder abduction, biceps, triceps, wrist extensors, finger flexors, interossei, and    LE strength 5/5 - including hip flexors, knee flexors, knee extensors, ankle dorsiflexors, ankle plantar flexors, and EHL   Sensation - intact to light touch in bilateral lower and upper extremities.   Reflexes - diminished but equal biceps, brachioradialis, triceps, patellar and Achilles reflexes bilaterally No Clonus, No Babinski, Renee's negative bilaterally  GAIT - Normal base, normal stride length, non-antalgic. Able to toe walk and heel walk without difficulty.     PROCEDURE:    Lidocaine 1%- 5 cc's used, 0 cc's wasted  200mg/20mL (10mg/mL)  NDC 2343-7969-03  LOT  [id number]  EXP 03/01/2025  [facility]        Cervical and Thoracic trigger point injections:    Description of the Procedure: The procedure, risks and alternative treatments were discussed with the patient. After written informed consent was obtained, the trigger points in the cervical paraspinal,, posterior scalene, levator scapulae, upper trapezius, and rhomboid muscles were palpated and marked. The skin was prepped three times with alcohol. Using a 27 gauge 1.5 inch needle, after negative aspiration, the trigger points in each muscle were injected with a total of 6 cc's of 1% lidocaine, spread equally into 6 sites. Twitch responses were observed in the musculature. The patient tolerated the procedure well with no immediate complications or bleeding.      Plan:   1. The patient was instructed in post-procedural care.  2. The patient was asked to apply moist heat and or ice for the next 24 hours and to perform daily gentle stretching exercises.     -Ice on and off for the next 24 hours if injection sites are sore. Do gentle range of motion exercises in each area that was injected. Try to do them every hour for about half a minute or so, in every direction that the affected part goes. No pool, bath, or hot tub today. Avoid heavy lifting for the next 2 days.     Physical Exam  Pulmonary:

## 2024-04-09 ENCOUNTER — OFFICE VISIT (OUTPATIENT)
Dept: PHYSICAL MEDICINE AND REHAB | Facility: CLINIC | Age: 64
End: 2024-04-09
Payer: COMMERCIAL

## 2024-04-09 VITALS
TEMPERATURE: 97.3 F | WEIGHT: 146 LBS | OXYGEN SATURATION: 100 % | HEIGHT: 66 IN | HEART RATE: 58 BPM | BODY MASS INDEX: 23.46 KG/M2

## 2024-04-09 DIAGNOSIS — M54.42 LOW BACK PAIN WITH LEFT-SIDED SCIATICA, UNSPECIFIED BACK PAIN LATERALITY, UNSPECIFIED CHRONICITY: ICD-10-CM

## 2024-04-09 DIAGNOSIS — G89.29 CHRONIC PAIN OF LEFT UPPER EXTREMITY: ICD-10-CM

## 2024-04-09 DIAGNOSIS — M54.2 NECK PAIN: ICD-10-CM

## 2024-04-09 DIAGNOSIS — M79.18 MYOFASCIAL PAIN: ICD-10-CM

## 2024-04-09 DIAGNOSIS — M17.11 PRIMARY OSTEOARTHRITIS OF RIGHT KNEE: ICD-10-CM

## 2024-04-09 DIAGNOSIS — G89.29 CHRONIC LOW BACK PAIN, UNSPECIFIED BACK PAIN LATERALITY, UNSPECIFIED WHETHER SCIATICA PRESENT: ICD-10-CM

## 2024-04-09 DIAGNOSIS — M53.3 SACROILIAC JOINT DYSFUNCTION OF BOTH SIDES: ICD-10-CM

## 2024-04-09 DIAGNOSIS — M54.50 CHRONIC LOW BACK PAIN, UNSPECIFIED BACK PAIN LATERALITY, UNSPECIFIED WHETHER SCIATICA PRESENT: ICD-10-CM

## 2024-04-09 DIAGNOSIS — M79.602 CHRONIC PAIN OF LEFT UPPER EXTREMITY: ICD-10-CM

## 2024-04-09 DIAGNOSIS — M54.6 CHRONIC THORACIC BACK PAIN, UNSPECIFIED BACK PAIN LATERALITY: ICD-10-CM

## 2024-04-09 DIAGNOSIS — M25.561 CHRONIC PAIN OF RIGHT KNEE: ICD-10-CM

## 2024-04-09 DIAGNOSIS — G89.29 CHRONIC PAIN OF RIGHT KNEE: ICD-10-CM

## 2024-04-09 DIAGNOSIS — Z98.890 HISTORY OF LUMBAR SURGERY: ICD-10-CM

## 2024-04-09 DIAGNOSIS — M41.9 SCOLIOSIS, UNSPECIFIED SCOLIOSIS TYPE, UNSPECIFIED SPINAL REGION: ICD-10-CM

## 2024-04-09 DIAGNOSIS — G89.29 CHRONIC THORACIC BACK PAIN, UNSPECIFIED BACK PAIN LATERALITY: ICD-10-CM

## 2024-04-09 DIAGNOSIS — M54.16 LUMBAR RADICULOPATHY: Primary | ICD-10-CM

## 2024-04-09 DIAGNOSIS — M46.1 INFLAMMATION OF SACROILIAC JOINT (CMS-HCC): ICD-10-CM

## 2024-04-09 DIAGNOSIS — M46.1 SACROILIITIS (CMS-HCC): ICD-10-CM

## 2024-04-09 PROCEDURE — 20553 NJX 1/MLT TRIGGER POINTS 3/>: CPT | Performed by: PHYSICAL MEDICINE & REHABILITATION

## 2024-04-09 PROCEDURE — 99214 OFFICE O/P EST MOD 30 MIN: CPT | Performed by: PHYSICAL MEDICINE & REHABILITATION

## 2024-04-09 ASSESSMENT — PAIN SCALES - GENERAL: PAINLEVEL: 8

## 2024-04-19 ENCOUNTER — HOSPITAL ENCOUNTER (OUTPATIENT)
Dept: RADIOLOGY | Facility: HOSPITAL | Age: 64
Discharge: HOME | End: 2024-04-19
Payer: COMMERCIAL

## 2024-04-19 VITALS
BODY MASS INDEX: 22.5 KG/M2 | WEIGHT: 140 LBS | HEIGHT: 66 IN | TEMPERATURE: 97.5 F | HEART RATE: 55 BPM | OXYGEN SATURATION: 100 % | RESPIRATION RATE: 16 BRPM | DIASTOLIC BLOOD PRESSURE: 76 MMHG | SYSTOLIC BLOOD PRESSURE: 126 MMHG

## 2024-04-19 DIAGNOSIS — M46.1 SACROILIITIS (CMS-HCC): ICD-10-CM

## 2024-04-19 PROCEDURE — 64625 RF ABLTJ NRV NRVTG SI JT: CPT | Performed by: ANESTHESIOLOGY

## 2024-04-19 PROCEDURE — 99152 MOD SED SAME PHYS/QHP 5/>YRS: CPT | Performed by: ANESTHESIOLOGY

## 2024-04-19 PROCEDURE — 99153 MOD SED SAME PHYS/QHP EA: CPT | Performed by: ANESTHESIOLOGY

## 2024-04-19 PROCEDURE — 64625 RF ABLTJ NRV NRVTG SI JT: CPT | Mod: LT | Performed by: ANESTHESIOLOGY

## 2024-04-19 PROCEDURE — 2500000004 HC RX 250 GENERAL PHARMACY W/ HCPCS (ALT 636 FOR OP/ED): Performed by: ANESTHESIOLOGY

## 2024-04-19 PROCEDURE — 2720000007 HC OR 272 NO HCPCS

## 2024-04-19 RX ORDER — FENTANYL CITRATE 50 UG/ML
INJECTION, SOLUTION INTRAMUSCULAR; INTRAVENOUS
Status: COMPLETED | OUTPATIENT
Start: 2024-04-19 | End: 2024-04-19

## 2024-04-19 RX ORDER — MIDAZOLAM HYDROCHLORIDE 1 MG/ML
INJECTION INTRAMUSCULAR; INTRAVENOUS
Status: COMPLETED | OUTPATIENT
Start: 2024-04-19 | End: 2024-04-19

## 2024-04-19 RX ADMIN — FENTANYL CITRATE 50 MCG: 50 INJECTION, SOLUTION INTRAMUSCULAR; INTRAVENOUS at 09:22

## 2024-04-19 RX ADMIN — FENTANYL CITRATE 50 MCG: 50 INJECTION, SOLUTION INTRAMUSCULAR; INTRAVENOUS at 09:09

## 2024-04-19 RX ADMIN — MIDAZOLAM HYDROCHLORIDE 1 MG: 1 INJECTION INTRAMUSCULAR; INTRAVENOUS at 09:09

## 2024-04-19 RX ADMIN — MIDAZOLAM HYDROCHLORIDE 1 MG: 1 INJECTION INTRAMUSCULAR; INTRAVENOUS at 09:22

## 2024-04-19 ASSESSMENT — PAIN SCALES - GENERAL
PAINLEVEL_OUTOF10: 5 - MODERATE PAIN
PAINLEVEL_OUTOF10: 6
PAINLEVEL_OUTOF10: 5 - MODERATE PAIN
PAINLEVEL_OUTOF10: 5 - MODERATE PAIN
PAINLEVEL_OUTOF10: 0 - NO PAIN
PAINLEVEL_OUTOF10: 4
PAINLEVEL_OUTOF10: 5 - MODERATE PAIN
PAINLEVEL_OUTOF10: 4

## 2024-04-19 ASSESSMENT — PAIN - FUNCTIONAL ASSESSMENT
PAIN_FUNCTIONAL_ASSESSMENT: 0-10

## 2024-04-19 ASSESSMENT — PAIN DESCRIPTION - DESCRIPTORS: DESCRIPTORS: TIGHTNESS

## 2024-04-19 NOTE — H&P
HISTORY AND PHYSICAL    History Of Present Illness  Lilli Aldridge is a 64 y.o. female presenting with chronic pain.  Here for  bilateral SI RFA    she denies any recent antibiotic use or infections, she denies any blood thinner use , and she denies contrast or local anesthetic allergies     Past Medical History  Past Medical History:   Diagnosis Date    Cervical high risk human papillomavirus (HPV) DNA test positive     Cervical high risk HPV (human papillomavirus) test positive    Cough variant asthma (HHS-HCC) 11/25/2017    Cough variant asthma    Lateral epicondylitis, unspecified elbow     Lateral epicondylitis (tennis elbow)    Personal history of diseases of the blood and blood-forming organs and certain disorders involving the immune mechanism     History of neutropenia    Personal history of other diseases of the musculoskeletal system and connective tissue     History of scoliosis    Urge incontinence 09/16/2022    Urge incontinence    Urinary tract infection, site not specified 09/15/2022    Recurrent UTI       Surgical History  Past Surgical History:   Procedure Laterality Date    BACK SURGERY  07/06/2016    Back Surgery    HYSTERECTOMY  07/06/2016    Hysterectomy    MOUTH SURGERY  11/09/2017    Oral Surgery Tooth Extraction    OTHER SURGICAL HISTORY  11/09/2017    Cervical Conization By Cold Knife    TONSILLECTOMY  11/09/2017    Tonsillectomy    TUBAL LIGATION  07/06/2016    Tubal Ligation        Social History  She reports that she has never smoked. She has never been exposed to tobacco smoke. She has never used smokeless tobacco. She reports that she does not drink alcohol and does not use drugs.    Family History  Family History   Problem Relation Name Age of Onset    Other (Diabetes mellitus) Mother      Hypertension Mother      Cataracts Mother      Glaucoma Mother      Other (Diabetes mellitus) Father      Hypertension Father      Liver cancer Father      Cataracts Father      Glaucoma Father       "Decreased libido Sister          Allergies  Penicillins, Cat dander, Dog dander, and Erythromycin    Review of Systems   12 point ROS done and negative except for the above.   Physical Exam     General: NAD, well groomed, well nourished  Eyes: Non-icteric sclera, EOMI  Ears, Nose, Mouth, and Throat: External ears and nose appear to be without deformity or rash. No lesions or masses noted. Hearing is grossly intact.   Neck: Trachea midline  Respiratory: Nonlabored breathing   Cardiovascular: No peripheral edema   Skin: No rashes or open lesions/ulcers identified on skin.    Last Recorded Vitals  Blood pressure 141/82, pulse 69, temperature 35.3 °C (95.5 °F), temperature source Temporal, resp. rate 16, height 1.676 m (5' 6\"), weight 63.5 kg (140 lb), SpO2 100%.    Relevant Results           Assessment/Plan       Risks, benefits, alternatives discussed. All questions answered to the best of my ability. Patient agrees to proceed.   -We will proceed with planned procedure        Reggie Umaña MD  Chronic Pain Fellow  [facility]    "

## 2024-04-19 NOTE — PROCEDURES
Pre-Op Diagnosis: Left sacroiliac joint chronic pain  Post-Procedure Diagnosis: Same as preop diagnosis  Procedure: Radiofrequency neurotomy of the left sacroiliac joint innervation  Surgeon: Elisabet Artis MD, PhD   Resident/Fellow/Other Assistant:     Procedure Note:     Ms. Lilli Aldridge is a 64 y.o. female with chronic left sacroiliac joint pain status post multiple injections by Dr. Keith with good short lasting relief presents today for radiofrequency neurotomy of the left sacroiliac joint denervation.  She has recently developed some right lower extremity pain and will be evaluated in the office for that.  Following informed consent, the patient was brought to the operating room, timeout was carried out and placed in the prone position.  The low back area was prepped and draped in usual sterile fashion.  Using fluoroscopic guidance, a template for the [place] procedure was appropriately sited medial to the left sacroiliac joint and lateral to the left sacral foramina.  The skin and subcutaneous tissue underneath each one of the template's seven needle openings were anesthetized with 0.5% lidocaine.   Seven 18-gauge radiofrequency needles with 10 mm active tips were then advanced under fluoroscopic guidance to the appropriate locations.  2% lidocaine was then applied at the needle tips and bipolar radiofrequency neurotomy was then carried out using the [place] protocol at 85 degrees.  At the conclusion of the procedure, the needles were removed and the patient was then transferred to recovery room in stable condition.  The patient will update us on an outpatient basis on the response to the procedure.

## 2024-05-06 ENCOUNTER — TELEPHONE (OUTPATIENT)
Dept: UROLOGY | Facility: CLINIC | Age: 64
End: 2024-05-06
Payer: COMMERCIAL

## 2024-05-09 ENCOUNTER — OFFICE VISIT (OUTPATIENT)
Dept: UROLOGY | Facility: CLINIC | Age: 64
End: 2024-05-09
Payer: COMMERCIAL

## 2024-05-09 VITALS — DIASTOLIC BLOOD PRESSURE: 77 MMHG | SYSTOLIC BLOOD PRESSURE: 115 MMHG

## 2024-05-09 DIAGNOSIS — N39.41 URGE URINARY INCONTINENCE: ICD-10-CM

## 2024-05-09 DIAGNOSIS — N81.89 PELVIC FLOOR WEAKNESS: ICD-10-CM

## 2024-05-09 DIAGNOSIS — N95.2 ATROPHIC VAGINITIS: ICD-10-CM

## 2024-05-09 DIAGNOSIS — R10.2 PELVIC PAIN: Primary | ICD-10-CM

## 2024-05-09 LAB
POC APPEARANCE, URINE: CLEAR
POC BILIRUBIN, URINE: NEGATIVE
POC BLOOD, URINE: NEGATIVE
POC COLOR, URINE: YELLOW
POC GLUCOSE, URINE: NEGATIVE MG/DL
POC KETONES, URINE: NEGATIVE MG/DL
POC LEUKOCYTES, URINE: ABNORMAL
POC NITRITE,URINE: NEGATIVE
POC PH, URINE: 6 PH
POC PROTEIN, URINE: NEGATIVE MG/DL
POC SPECIFIC GRAVITY, URINE: 1.01
POC UROBILINOGEN, URINE: 0.2 EU/DL

## 2024-05-09 PROCEDURE — 1036F TOBACCO NON-USER: CPT | Performed by: OBSTETRICS & GYNECOLOGY

## 2024-05-09 PROCEDURE — 81003 URINALYSIS AUTO W/O SCOPE: CPT | Mod: QW | Performed by: OBSTETRICS & GYNECOLOGY

## 2024-05-09 PROCEDURE — 99214 OFFICE O/P EST MOD 30 MIN: CPT | Performed by: OBSTETRICS & GYNECOLOGY

## 2024-05-09 NOTE — PATIENT INSTRUCTIONS
You have been provided a referral as well as a list of providers for pelvic floor physical therapy.  They should treat your left obturator pain.    Should this not be successful, please contact the clinic and we will discuss proceeding with pelvic floor injections to treat your pain.    [phone]

## 2024-05-09 NOTE — PROGRESS NOTES
Subjective   Patient ID: Lilli Aldridge is a 64 y.o. female who presents for No chief complaint on file..      HPI    64 - year-old patient presenting as a referral from 63-year-old with urinary urgency and frequency, nocturia, pelvic floor weakness and pain, and vaginal atrophy.    The patient frequency has resolved since previous visit. She is having constant pelvic pain in the left side. Prior to urinating she is feeling pain. She is taking tylenol every 4 hours for pain relief. She wakes up once a night to urinate. She denies any history of nephrolithiasis, gross hematuria or chronic recurrent UTIs.    She denies any bowel related complaints, no fecal or flatal incontinence.    She denies any vaginal complaints, no abnormal bleeding or discharge.     She is sexually active but denies any vaginal complaints, no abnormal vaginal bleeding or discharge. She denies any vaginal dryness or irritation. She denies any bulge complaints, no pressure or pulling.    She has no other complaints.    Previous visit note:    Patient reports she is using Myrbetriq and the estrogen cream. She is not having to urinate as frequently during the daytime. Patient advised the estrogen cream is safe to use and is beneficial for her vaginal health. Patient agrees with overall improvement of her bladder symptoms. She is extremely satisfied with her present therapy.     She denies any vaginal complaints, no bleeding or discharge.      She has no other complaints.     From previous note   This visit was performed through telemedicine   63-year-old with urinary urgency and frequency, nocturia, pelvic floor weakness and pain, and vaginal atrophy.     The patient states that Myrbetriq therapy has improved her urgency and frequency symptoms. However, she continues to note at least 2 episodes of nocturia but believes its because she takes fluid before going to bed. She is overall very satisfied with her medication therapy.     She continues to  "use her vaginal estrogen cream.     She has no other complaints     From previous note   Following visit was performed through telemedicine  63-year-old with urinary urgency and frequency, nocturia, pelvic floor weakness and pain, and vaginal atrophy presenting to discuss her voiding diary.     The patient notes 2 episodes of nocturia. She is urinating 2 to 4 ounces with each void during the nighttime. Daytime she is urinating roughly every 2 hours with capacity of 9 ounces. She denies any urge related incontinence complaints. She is drinking an appropriate amount of fluid.     She has noted some irritation associated with her vaginal estrogen cream.     She has no other complaints        From previous note  63-year-old presenting as referral from Dr. Jang with complaints of frequent urination and nocturia.     The patient has \"a couple of years\" of complaints of urinary urgency and frequency. She notes daytime urgency every 2 hours. She denies any urge related incontinence. She however is most bothered by her nocturia which occurs 3-5 times nightly. She denies enuresis. She was recently started on tolterodine which has caused significant dry mouth complaints as well as a headache. She otherwise denies any stress urinary incontinence. She denies a history of nephrolithiasis, chronic urinary tract infections, or any gross hematuria.     The patient underwent a hysterectomy roughly 13 years ago. She states that she has a history of cervical cancer but has never undergone any chemotherapy or pelvic radiation for this. She is sexually active and denies any abnormal vaginal bleeding or discharge. She denies any dyspareunia.     She denies any bowel related complaints. She denies any fecal or flatal incontinence.     She has no other complaints.       Review of Systems    Constitutional: No fever, No chills and No fatigue.   Eyes: No vision problems and No dryness of the eyes.   ENT: No dry mouth, No hearing loss and No " nosebleeds.   Cardiovascular: No chest pain, No palpitations and No orthopnea.   Respiratory: No shortness of breath, No cough and No wheezing.   Gastrointestinal: No abdominal pain, No constipation, No nausea, No diarrhea, No vomiting and No melena.   Genitourinary: As noted in HPI.   Musculoskeletal: No back pain, No myalgias, No muscle weakness, No joint swelling and No leg edema.   Integumentary: No rashes, No skin lesion and No itching.   Neurological: No headache, No numbness and No dizziness.   Psychiatric: No sleep disturbances, No anxiety and No depression.   Endocrine: No hot flashes, No loss of hair and No hirsutism.   Hematologic/Lymphatic: No swollen glands, No tendency for easy bleeding and No tendency for easy bruising.   All other systems have been reviewed and are negative for complaint.       Objective   Physical Exam    PHYSICAL EXAMINATION:  No LMP recorded. Patient has had a hysterectomy.  There is no height or weight on file to calculate BMI.  /77 (BP Location: Right arm)   Constitutional: alert and in no acute distress, well developed, well nourished.   Head and Face: head and face: unremarkable.   Eyes, Ears, Nose, Mouth, and Throat: unremarkable external exam - nonicteric sclera, extraocular movements intact (EOMI) and no ptosis,~unremarkable external inspection of ears and nose.   Neck: no neck asymmetry, supple,~thyroid not enlarged and there were no palpable thyroid nodules.   Cardiovascular: unremarkable distal pulses.   Pulmonary: no respiratory distress, unremarkable respiratory effort.,~clear bilateral breath sounds.   Abdomen: soft nontender; no abdominal mass palpated,~no organomegaly~and~no hernias.   Musculoskeletal: no joint swelling seen, normal movements of all extremities.   Skin: normal skin color and pigmentation, normal skin turgor, and no rash.   Neurologic: cranial nerves: non-focal, grossly intact.   Lymphatic: no cervical lymphadenopathy,~palpation of lymph nodes  in other areas: normal.   Psychiatric: alert and oriented x 3,~affect normal to patient baseline,~mood: appropriate~and~judgment and insight: intact    Pelvic:  Genitourinary:  normal external genitalia, Bartholin's glands negative, [place]'s glands negative  Urethra   normal meatus, non-tender, no periurethral mass  Vaginal mucosa  normal  Cervix surgically absent  Uterus surgically absent  Adnexae  negative nontender, no masses  Atrophy positive    CST negative  Pelvic floor muscle contraction  1/5, point tenderness over the left obturator with no other pain noted throughout exam.    POP-Q (in supine position):  Stage 0  Rectal: no hemorrhoids, fissures or masses.      Assessment/Plan      63-year-old with history of urinary urgency and frequency, nocturia, pelvic floor weakness and pain, and vaginal atrophy presenting with worsening left obturator pelvic pain.     #1 there was no evidence of nocturnal polyuria on voiding diary. She was noted to have small capacity with urgency consistent with overactive bladder. The patient has previously utilized anticholinergic therapy with significant dry mouth and headache complaints.  She was having excellent results with Myrbetriq therapy but has since stopped this.  She denies any bothersome lower urinary tract urgency and frequency complaints at this time.     #2 we again discussed the patient's pelvic floor weakness and pain and how this relates to her lower urinary tract symptoms. The patient was previously provided a referral as well as a list of pelvic lower physical therapist.  Today she is noted to have point tenderness over the left obturator.  We discussed following up with her pelvic floor physical therapist as soon as possible.  We discussed the possibility of pelvic floor injections moving forward should this not be successful.     3. We have previously discussed the patient's vaginal atrophy and how this affects her lower urinary tract symptoms. She has had  irritation associated with vaginal estrogen cream.  She was previously utilizing Vagifem but has since stopped this.  She denies any vaginal complaints at this time.  Will readdress the need for vaginal estrogen therapy at follow-up appointments.     4. The patient will contact the clinic should she have no improvements with her pelvic floor physical therapy for possible scheduling of pelvic floor injections over the left obturator.    MD Renate Santizo 05/09/24 10:49 AM

## 2024-05-18 ENCOUNTER — HOSPITAL ENCOUNTER (OUTPATIENT)
Dept: RADIOLOGY | Facility: CLINIC | Age: 64
Discharge: HOME | End: 2024-05-18
Payer: COMMERCIAL

## 2024-05-18 DIAGNOSIS — R05.9 COUGH, UNSPECIFIED TYPE: ICD-10-CM

## 2024-05-18 PROCEDURE — 71046 X-RAY EXAM CHEST 2 VIEWS: CPT | Performed by: RADIOLOGY

## 2024-05-18 PROCEDURE — 71046 X-RAY EXAM CHEST 2 VIEWS: CPT

## 2024-06-10 NOTE — PATIENT INSTRUCTIONS
-Continue Topamax 100 mg twice a day  -Continue Robaxin and meloxicam as needed , meloxicam renewed  -Consider other medications in the future including Lyrica, Cymbalta, nortriptyline  -Consider knee injection  in the future if needed  -Continue daily home exercises  -Do not wear SIJ brace for more than 2 to 3 hours/day.  -consider cornerloc or transloc procedures with Dr. Artis versus iFuse procedure (with Dr. Matute).   - consider neck PT and MRI in the future  -EMG ordered. Dr. Chris [phone]  -Follow-up after EMG

## 2024-06-10 NOTE — PROGRESS NOTES
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 Physical exam is reassuring for lack of neurological compromise. Symptoms and physical exam findings in this complex patient and her of unclear etiology at the moment, but most likely a result of sacroiliac joint dysfunction and reactive myofascial pain/tension, possibly stemming from right knee osteoarthritis. However, lumbar radiculopathy and spondylosis are on the differential, as well as referral from ovarian distention into the posterior right knee, given her history of cervical cancer in the past.    -Continue Topamax 100 mg twice a day  -Continue Robaxin and meloxicam as needed , meloxicam renewed  -Consider other medications in the future including Lyrica, Cymbalta, nortriptyline  -Consider knee injection  in the future if needed  -Continue daily home exercises  -Do not wear SIJ brace for more than 2 to 3 hours/day.  -consider cornerloc or transloc procedures with Dr. Artis versus iFuse procedure (with Dr. Matute).   - consider neck PT and MRI in the future  -EMG ordered. Dr. Chris [phone]  -Follow-up after EMG    The patient expressed understanding and agreement with the assessment and plan. Patient encouraged to contact us should they have any questions, concerns, or any changes in symptoms.     Thank you for allowing me to participate in the care of your patient.      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    She was last seen here on 4/9/2024, at which point I did left upper back and neck trigger point injections.  Previously right-sided injections helped 100%, and this time also helped 100%, no longer any pain there    I advised her to continue her medications. She takes meloxicam as needed, needs a refill. PCP ordered CMP to be done soon.    I advised her to continue daily exercises.    She underwent left SIJ RFA on 4/19/23 and he previously discussed the transloc procedure. The RFA has helped so far but more so for the back than the anterior lateral thigh, 85% improvement for the back vs 50%.      She started having coldness and more pain in the L anterior thigh again in the past month.  She is interested in an EMG to see if its LFCN vs lumbar radic.    She rates her pain as a 5/10, previously 8/10.    Otherwise, there've been no changes to her medications or past medical history since the last visit.    ___________________  4/1/2020: Continue home exercises, take meloxicam as needed, Voltaren gel as needed, follow up as needed  12/7/2020: Thoracolumbar trigger point injections, resume exercises, try diclofenac instead of meloxicam and ibuprofen, try Robaxin instead of Skelaxin  1/11/2021: Start gabapentin, continue home exercises, consider MRI, follow-up in 2 months  5/10/2021: Continue exercises, continue diclofenac and Robaxin as needed, consider other medications in the future, consider injections, avoid sitting crosslegged  8/23/2021: Continue exercises, medications as needed, consider MRIs and injections in the future  2/21/2022: Lumbar and thoracic MRI ordered, continue exercises  5/10/2022: Thoracic and lumbar MRI reviewed,, thoracic trigger point injections, referral for lumbar MJ versus facet block/RFA, continue exercises, meloxicam instead of diclofenac, continue Robaxin.  6/14/2022: Core exercises, continue medications as needed,  "consider repeat injections  8/29/2022: Continue medications as needed, daily home exercises focusing on active strengthening, consider repeat injections with Dr. Artis if needed  2/27/2023: Core exercises provided, continue meloxicam and Robaxin as needed, consider other medications, consider ultrasound-guided SI joint injections  6/5/2023: continue core exercises, continue meloxicam and Robaxin as needed, topamax 50 mg daily consider other medications, consider ultrasound-guided SI joint injections  10/13/2023: Left ultrasound-guided SI joint injection, bilateral thoracic trigger point injections, continue medications and exercises.  11/21/23: Continue medications, exercises, SI joint brace ordered, had a Licart provided, consider other SI joint options, follow-up for another injection in the meantime  12/5/2023: left US guided SIJ injection. Continue medications, exercises, SI joint brace ordered, had a Licart provided, consider other SI joint options  2/13/24: Prednisone taper, increase Topamax, restart meloxicam, continue exercises, do not wear brace for more than 2 to 3 hours, proceed with MRI, follow-up for tremor  3/19/24: same day visit, right upper back and neck trigger point injections, continue medications, proceed with repeat MRI, consider neck MRI, continue exercises, neck x-ray ordered   4/9/2024: Left upper back and neck trigger point injections, continue medications,  consider neck MRI, continue exercises, proceed with SI joint procedures with Dr. Artis   6/11/2024: Continue medications and exercises, EMG ordered for the left thigh, consider minimally invasive SI joint fusion procedures    _______________  As a reminder:    TIMELINE OF COMPLAINT(S):  The patient had back surgery back in 2002, she is not sure what level, may be something involving L4, and she is not sure if there was fusion. This was for \"sciatica or arthritis\", and at the time she had tingling and weakness during down her right foot. " "Surgery helped, and she had no issues until this past June 2019. There are x-rays in June 2018, but the patient is adamant that it only started in 2019. She said that she was doing well from 2002 until 2019. At some point in the middle, about 6 years ago, she was told that she has scoliosis, but she had no main issues.     When she started feeling the pain come back in June 2019, she had some x-rays done and was told that her \"bones were deteriorating\". Then a few months later in August, she fell when she missed a step as she ran down the stairs, and landed on the top of her right hand, and since then it cramps up with certain movements, like when she wipes herself. The back of her knee also started hurting in June 2019, and is worse with prolonged bending and then getting up to stand.     Also in the past month, she felt the tingling \"like warm ants tingling\" going from her lower back to her right shoulder blade. This past weekend she felt pain in both posterior shoulders and neck. The left side is fine now, but she still has a dull ache in the right posterior shoulder and neck area.    The low back is the most bothersome along with the right knee. She is also had tingling in the right foot since June .    Pain:  LOCATION- low back LSJ, L=R but alternates, L groin, bilateral posterior shoulder pain , post neck and right knee and thigh posteriorly  RADIATION- Down R upper arm to elbow. no pain below knee or elbow.  ASSOCIATED WITH- no falls  NUMBNESS/TINGLING- Yes, right foot  WEAKNESS- No  CONSTANT or INTERMITTENT- Intermittent  SEVERITY/QUANTITY- 8, sitting here now 6/10 in shoulder, but none in back  QUALITY- Dull, achy  EXACERBATED BY- Nothing  BETTER WITH- Cold or warm compress  TRIED- Naproxen doesn’t help, flexeril doesn’t help just sleepy. , Tylenol Arthritis takes edge off, meloxicam hasn’t taken in while, medrol dose packs for respiratory issues and also helped with pain. No other meds for this    PHYSICAL " THERAPY: Yes, last time was in 2002, does some HEP, no TENS  CHIROPRACTIC MANIPULATION: No  ACUPUNCTURE TREATMENTS: No  DEEP TISSUE MASSAGE THERAPY: Yes, helps temporarily  OSTEOPATHIC MANIPULATION THERAPY: No  INJECTIONS: No  EMG/NCS: No    IMAGING: Yes, LS xray 8/2018 L5-S1 disc space narrowing, no knee or neck imaging in our system.    neck x-ray was done on 3/19/2024 and it showed mild to moderate multilevel cervical spondylosis especially at C5-C7.       [facility] pelvic MRI done on 4/4/2024:  IMPRESSION:  1.  Mild insertional tendinosis of bilateral gluteus medius tendons.  2. Mild bilateral trochanteric bursitis.  3. Mild bilateral hip osteoarthrosis.  4. Marked bilateral facet osteoarthropathy at L4-L5 and L5-S1 with  bilateral facet effusions at L4-L5. At least moderate grade bilateral  L4-5 osseous foraminal stenosis. Consider dedicated lumbar spine MRI  for further evaluation if clinically warranted.  5. Mild osteoarthrosis of the left sacroiliac joint.  6. Small amount of free fluid in the dependent pelvis, a nonspecific finding.      FUNCTIONAL HISTORY: The patient is independent in all ADLs, mobility, and driving. The patient does not use any assistive device.    SOCIAL HISTORY:  Lives in: [facility]  Lives with: Spouse  Occupation: Clergy  Smoking:No  Alcohol: No  Drugs: No    ____________  ROS: The patient denies any bowel or bladder incontinence/accidents, night sweats, fevers, chills, recent significant weight loss. A 14 point review of systems was reviewed with the patient and is as above and otherwise negative. ROS questionnaire positive for joint pain only    Physical Exam  GEN - Alert, well-developed, well-nourished, no apparent distress  PSYCH - Cooperative, appropriate mood and affect  HEENT - NC/AT  RESP - Non-labored respirations, equal expansion  CV - well-perfused, No cyanosis or edema in extremities.   ABD- soft, ND  SKIN - No rash.    Positive Yolette and Gaenslen test on the left,  significant tenderness over the left SI joint and surrounding paraspinal and gluteal musculature.  Hip range of motion itself was okay and did not reproduce hip pain.    Previous BACK/SPINE - Symmetric posture, no erythema, edema, or swelling. Full lumbar range of motion without provocation of pain symptoms. There was tenderness over both SI joints significantly, as well as bilateral gluteal muscles. No significant tenderness over the greater trochanteric bursa areas. Mild tenderness over the lumbar paraspinal muscles.. Straight leg raise negative bilaterally. Sitting slump test negative bilaterally. Femoral stretch test negative bilaterally.     Previous HIPS/PELVIS - Symmetric in standing and lying Passive hip flexion, internal rotation, and external rotation within functional normal limits bilaterally without provocation of pain symptoms. No tenderness over iliopsoas tendon.positive FABERs on the left, positive Gaenslen's on the right. Negative hip clicking. Negative hip impingement test. Negative log roll. Negative resisted active SLR. No pain with deep hip flexion.patient was not able to do single leg sit to stand on either side    Previous NEURO - UE strength 5/5 - including shoulder abduction, biceps, triceps, wrist extensors, finger flexors, interossei, and    LE strength 5/5 - including hip flexors, knee flexors, knee extensors, ankle dorsiflexors, ankle plantar flexors, and EHL   Sensation - intact to light touch in bilateral lower and upper extremities.   Reflexes - diminished but equal biceps, brachioradialis, triceps, patellar and Achilles reflexes bilaterally No Clonus, No Babinski, Renee's negative bilaterally  GAIT - Normal base, normal stride length, non-antalgic. Able to toe walk and heel walk without difficulty.

## 2024-06-11 ENCOUNTER — OFFICE VISIT (OUTPATIENT)
Dept: PHYSICAL MEDICINE AND REHAB | Facility: CLINIC | Age: 64
End: 2024-06-11
Payer: COMMERCIAL

## 2024-06-11 VITALS
HEART RATE: 64 BPM | WEIGHT: 145.5 LBS | DIASTOLIC BLOOD PRESSURE: 100 MMHG | BODY MASS INDEX: 23.38 KG/M2 | SYSTOLIC BLOOD PRESSURE: 181 MMHG | HEIGHT: 66 IN | RESPIRATION RATE: 18 BRPM | TEMPERATURE: 97.1 F

## 2024-06-11 DIAGNOSIS — M53.3 SACROILIAC JOINT DYSFUNCTION OF BOTH SIDES: ICD-10-CM

## 2024-06-11 DIAGNOSIS — M79.602 CHRONIC PAIN OF LEFT UPPER EXTREMITY: ICD-10-CM

## 2024-06-11 DIAGNOSIS — G89.29 CHRONIC PAIN OF LEFT UPPER EXTREMITY: ICD-10-CM

## 2024-06-11 DIAGNOSIS — M54.42 LOW BACK PAIN WITH LEFT-SIDED SCIATICA, UNSPECIFIED BACK PAIN LATERALITY, UNSPECIFIED CHRONICITY: ICD-10-CM

## 2024-06-11 DIAGNOSIS — M79.18 MYOFASCIAL PAIN: ICD-10-CM

## 2024-06-11 DIAGNOSIS — M46.1 SACROILIITIS (CMS-HCC): ICD-10-CM

## 2024-06-11 DIAGNOSIS — M54.2 NECK PAIN: ICD-10-CM

## 2024-06-11 DIAGNOSIS — G89.29 CHRONIC LOW BACK PAIN, UNSPECIFIED BACK PAIN LATERALITY, UNSPECIFIED WHETHER SCIATICA PRESENT: ICD-10-CM

## 2024-06-11 DIAGNOSIS — M41.9 SCOLIOSIS, UNSPECIFIED SCOLIOSIS TYPE, UNSPECIFIED SPINAL REGION: ICD-10-CM

## 2024-06-11 DIAGNOSIS — M54.16 LUMBAR RADICULOPATHY: ICD-10-CM

## 2024-06-11 DIAGNOSIS — G89.29 CHRONIC PAIN OF RIGHT KNEE: Primary | ICD-10-CM

## 2024-06-11 DIAGNOSIS — Z98.890 HISTORY OF LUMBAR SURGERY: ICD-10-CM

## 2024-06-11 DIAGNOSIS — G89.29 CHRONIC THORACIC BACK PAIN, UNSPECIFIED BACK PAIN LATERALITY: ICD-10-CM

## 2024-06-11 DIAGNOSIS — M17.11 PRIMARY OSTEOARTHRITIS OF RIGHT KNEE: ICD-10-CM

## 2024-06-11 DIAGNOSIS — M46.1 INFLAMMATION OF SACROILIAC JOINT (CMS-HCC): ICD-10-CM

## 2024-06-11 DIAGNOSIS — M54.50 CHRONIC LOW BACK PAIN, UNSPECIFIED BACK PAIN LATERALITY, UNSPECIFIED WHETHER SCIATICA PRESENT: ICD-10-CM

## 2024-06-11 DIAGNOSIS — M25.561 CHRONIC PAIN OF RIGHT KNEE: Primary | ICD-10-CM

## 2024-06-11 DIAGNOSIS — M54.6 CHRONIC THORACIC BACK PAIN, UNSPECIFIED BACK PAIN LATERALITY: ICD-10-CM

## 2024-06-11 PROCEDURE — 1036F TOBACCO NON-USER: CPT | Performed by: PHYSICAL MEDICINE & REHABILITATION

## 2024-06-11 PROCEDURE — 99214 OFFICE O/P EST MOD 30 MIN: CPT | Performed by: PHYSICAL MEDICINE & REHABILITATION

## 2024-06-11 RX ORDER — MELOXICAM 7.5 MG/1
7.5-15 TABLET ORAL DAILY
Qty: 60 TABLET | Refills: 1 | Status: SHIPPED | OUTPATIENT
Start: 2024-06-11 | End: 2024-08-10

## 2024-06-11 ASSESSMENT — PAIN SCALES - GENERAL: PAINLEVEL: 5

## 2024-06-14 ENCOUNTER — LAB (OUTPATIENT)
Dept: LAB | Facility: LAB | Age: 64
End: 2024-06-14
Payer: COMMERCIAL

## 2024-06-14 DIAGNOSIS — M54.50 LOW BACK PAIN, UNSPECIFIED: ICD-10-CM

## 2024-06-14 DIAGNOSIS — E78.2 MIXED HYPERLIPIDEMIA: ICD-10-CM

## 2024-06-14 DIAGNOSIS — E55.9 VITAMIN D DEFICIENCY, UNSPECIFIED: Primary | ICD-10-CM

## 2024-06-14 DIAGNOSIS — E55.9 VITAMIN D DEFICIENCY, UNSPECIFIED: ICD-10-CM

## 2024-06-14 LAB
25(OH)D3 SERPL-MCNC: 49 NG/ML (ref 30–100)
ALBUMIN SERPL BCP-MCNC: 4.4 G/DL (ref 3.4–5)
ALP SERPL-CCNC: 71 U/L (ref 33–136)
ALT SERPL W P-5'-P-CCNC: 14 U/L (ref 7–45)
ANION GAP SERPL CALC-SCNC: 10 MMOL/L (ref 10–20)
AST SERPL W P-5'-P-CCNC: 17 U/L (ref 9–39)
BILIRUB SERPL-MCNC: 0.5 MG/DL (ref 0–1.2)
BUN SERPL-MCNC: 18 MG/DL (ref 6–23)
CALCIUM SERPL-MCNC: 9.4 MG/DL (ref 8.6–10.6)
CHLORIDE SERPL-SCNC: 111 MMOL/L (ref 98–107)
CHOLEST SERPL-MCNC: 332 MG/DL (ref 0–199)
CHOLESTEROL/HDL RATIO: 3.9
CO2 SERPL-SCNC: 25 MMOL/L (ref 21–32)
CREAT SERPL-MCNC: 0.99 MG/DL (ref 0.5–1.05)
EGFRCR SERPLBLD CKD-EPI 2021: 64 ML/MIN/1.73M*2
ERYTHROCYTE [DISTWIDTH] IN BLOOD BY AUTOMATED COUNT: 14.6 % (ref 11.5–14.5)
GLUCOSE SERPL-MCNC: 84 MG/DL (ref 74–99)
HCT VFR BLD AUTO: 45.6 % (ref 36–46)
HDLC SERPL-MCNC: 85.6 MG/DL
HGB BLD-MCNC: 14.1 G/DL (ref 12–16)
LDLC SERPL CALC-MCNC: 234 MG/DL
MCH RBC QN AUTO: 27.9 PG (ref 26–34)
MCHC RBC AUTO-ENTMCNC: 30.9 G/DL (ref 32–36)
MCV RBC AUTO: 90 FL (ref 80–100)
NON HDL CHOLESTEROL: 246 MG/DL (ref 0–149)
NRBC BLD-RTO: 0 /100 WBCS (ref 0–0)
PLATELET # BLD AUTO: 293 X10*3/UL (ref 150–450)
POTASSIUM SERPL-SCNC: 4.7 MMOL/L (ref 3.5–5.3)
PROT SERPL-MCNC: 6.5 G/DL (ref 6.4–8.2)
RBC # BLD AUTO: 5.06 X10*6/UL (ref 4–5.2)
SODIUM SERPL-SCNC: 141 MMOL/L (ref 136–145)
TRIGL SERPL-MCNC: 64 MG/DL (ref 0–149)
TSH SERPL-ACNC: 1.15 MIU/L (ref 0.44–3.98)
VLDL: 13 MG/DL (ref 0–40)
WBC # BLD AUTO: 1.9 X10*3/UL (ref 4.4–11.3)

## 2024-06-14 PROCEDURE — 80061 LIPID PANEL: CPT

## 2024-06-14 PROCEDURE — 82306 VITAMIN D 25 HYDROXY: CPT

## 2024-06-14 PROCEDURE — 36415 COLL VENOUS BLD VENIPUNCTURE: CPT

## 2024-06-14 PROCEDURE — 80053 COMPREHEN METABOLIC PANEL: CPT

## 2024-06-14 PROCEDURE — 84443 ASSAY THYROID STIM HORMONE: CPT

## 2024-06-14 PROCEDURE — 85027 COMPLETE CBC AUTOMATED: CPT

## 2024-06-20 ENCOUNTER — APPOINTMENT (OUTPATIENT)
Dept: NEUROLOGY | Facility: CLINIC | Age: 64
End: 2024-06-20
Payer: COMMERCIAL

## 2024-06-20 DIAGNOSIS — M79.605 PAIN OF LEFT LOWER EXTREMITY: Primary | ICD-10-CM

## 2024-06-20 DIAGNOSIS — R20.0 NUMBNESS: ICD-10-CM

## 2024-06-20 DIAGNOSIS — M54.16 LUMBAR RADICULOPATHY: ICD-10-CM

## 2024-06-20 PROCEDURE — 95910 NRV CNDJ TEST 7-8 STUDIES: CPT | Performed by: PSYCHIATRY & NEUROLOGY

## 2024-06-20 PROCEDURE — 95886 MUSC TEST DONE W/N TEST COMP: CPT | Performed by: PSYCHIATRY & NEUROLOGY

## 2024-06-20 NOTE — PROGRESS NOTES
Nerve conduction studies and needle EMG was performed today on this patient.  Full scanned report is available in the Procedure tab in Epic (Chart Review, Procedure tab, double-click the report to enlarge it).  Note:  Dr. Chris remains available to perform EMG studies until September 30, 2024, when he will be retiring.    Impression:  Nerve conduction study and needle examination of the left lower extremity and lumbar paraspinals were performed, see details in table.  The results were essentially within normal limits. There is unobtainable peroneal motor response recording at the EDB muscle, with needle exam showing chronic denervation in the left EDB.  This is a nonspecific and not necessarily abnormal finding in isolation especially in this age group.  No electrical evidence of lumbar radiculopathy was seen.  The patient describes clinical symptoms suggestive of left meralgia paresthetica which is not detectable on EMG/nerve conduction studies.  Clinical correlation recommended.      Nemesio Chris MD

## 2024-06-24 ENCOUNTER — APPOINTMENT (OUTPATIENT)
Dept: PHYSICAL THERAPY | Facility: CLINIC | Age: 64
End: 2024-06-24
Payer: COMMERCIAL

## 2024-07-06 DIAGNOSIS — M54.16 LUMBAR RADICULOPATHY: ICD-10-CM

## 2024-07-06 RX ORDER — TOPIRAMATE 100 MG/1
100 TABLET, FILM COATED ORAL 2 TIMES DAILY
Qty: 180 TABLET | Refills: 1 | Status: SHIPPED | OUTPATIENT
Start: 2024-07-06 | End: 2025-01-02

## 2024-07-23 NOTE — PATIENT INSTRUCTIONS
-Continue Topamax 100 mg twice a day  -Continue Robaxin and meloxicam as needed   -Consider other medications in the future including Lyrica, Cymbalta, nortriptyline  -Consider knee injection  in the future if needed  -Continue daily home exercises  -Do not wear SIJ brace for more than 2 to 3 hours/day.  -consider cornerloc or transloc procedures with Dr. Artis versus iFuse procedure (with Dr. Matute).   - consider neck PT and MRI in the future  -Follow-up for an ultrasound guided LFCN block.

## 2024-07-24 ENCOUNTER — APPOINTMENT (OUTPATIENT)
Dept: PHYSICAL MEDICINE AND REHAB | Facility: CLINIC | Age: 64
End: 2024-07-24
Payer: COMMERCIAL

## 2024-07-24 VITALS
TEMPERATURE: 97.1 F | HEIGHT: 66 IN | HEART RATE: 45 BPM | DIASTOLIC BLOOD PRESSURE: 84 MMHG | SYSTOLIC BLOOD PRESSURE: 149 MMHG | OXYGEN SATURATION: 100 % | WEIGHT: 139 LBS | BODY MASS INDEX: 22.34 KG/M2

## 2024-07-24 DIAGNOSIS — M54.16 LUMBAR RADICULOPATHY: ICD-10-CM

## 2024-07-24 DIAGNOSIS — Z98.890 HISTORY OF LUMBAR SURGERY: ICD-10-CM

## 2024-07-24 DIAGNOSIS — M79.602 CHRONIC PAIN OF LEFT UPPER EXTREMITY: ICD-10-CM

## 2024-07-24 DIAGNOSIS — M41.9 SCOLIOSIS, UNSPECIFIED SCOLIOSIS TYPE, UNSPECIFIED SPINAL REGION: ICD-10-CM

## 2024-07-24 DIAGNOSIS — G89.29 CHRONIC THORACIC BACK PAIN, UNSPECIFIED BACK PAIN LATERALITY: ICD-10-CM

## 2024-07-24 DIAGNOSIS — M53.3 SACROILIAC JOINT DYSFUNCTION OF BOTH SIDES: ICD-10-CM

## 2024-07-24 DIAGNOSIS — G89.29 CHRONIC PAIN OF LEFT UPPER EXTREMITY: ICD-10-CM

## 2024-07-24 DIAGNOSIS — M79.18 MYOFASCIAL PAIN: Primary | ICD-10-CM

## 2024-07-24 DIAGNOSIS — M54.50 CHRONIC LOW BACK PAIN, UNSPECIFIED BACK PAIN LATERALITY, UNSPECIFIED WHETHER SCIATICA PRESENT: ICD-10-CM

## 2024-07-24 DIAGNOSIS — M25.561 CHRONIC PAIN OF RIGHT KNEE: ICD-10-CM

## 2024-07-24 DIAGNOSIS — M54.42 LOW BACK PAIN WITH LEFT-SIDED SCIATICA, UNSPECIFIED BACK PAIN LATERALITY, UNSPECIFIED CHRONICITY: ICD-10-CM

## 2024-07-24 DIAGNOSIS — M54.2 NECK PAIN: ICD-10-CM

## 2024-07-24 DIAGNOSIS — M46.1 SACROILIITIS (CMS-HCC): ICD-10-CM

## 2024-07-24 DIAGNOSIS — G89.29 CHRONIC PAIN OF RIGHT KNEE: ICD-10-CM

## 2024-07-24 DIAGNOSIS — G89.29 CHRONIC LOW BACK PAIN, UNSPECIFIED BACK PAIN LATERALITY, UNSPECIFIED WHETHER SCIATICA PRESENT: ICD-10-CM

## 2024-07-24 DIAGNOSIS — M17.11 PRIMARY OSTEOARTHRITIS OF RIGHT KNEE: ICD-10-CM

## 2024-07-24 DIAGNOSIS — M46.1 INFLAMMATION OF SACROILIAC JOINT (CMS-HCC): ICD-10-CM

## 2024-07-24 DIAGNOSIS — M54.6 CHRONIC THORACIC BACK PAIN, UNSPECIFIED BACK PAIN LATERALITY: ICD-10-CM

## 2024-07-24 PROCEDURE — 3008F BODY MASS INDEX DOCD: CPT | Performed by: PHYSICAL MEDICINE & REHABILITATION

## 2024-07-24 PROCEDURE — 99214 OFFICE O/P EST MOD 30 MIN: CPT | Performed by: PHYSICAL MEDICINE & REHABILITATION

## 2024-07-24 ASSESSMENT — PAIN SCALES - GENERAL: PAINLEVEL: 4

## 2024-08-01 NOTE — PATIENT INSTRUCTIONS
-Ice on and off for the next 24 hours if injection sites are sore. Do gentle range of motion exercises in each area that was injected. Try to do them every hour for about half a minute or so, in every direction that the affected part goes. No pool, bath, or hot tub today. Avoid heavy lifting for the next 2 days.    -Continue Topamax 100 mg twice a day  -Continue Robaxin and meloxicam as needed   -Consider other medications in the future including Lyrica, Cymbalta, nortriptyline  -Consider knee injection  in the future if needed  -Continue daily home exercises  -Do not wear SIJ brace for more than 2 to 3 hours/day.  -consider cornerloc or transloc procedures with Dr. Artis versus iFuse procedure (with Dr. Matute).   - consider neck PT and MRI in the future  -Follow-up 4-6 weeks  
slipping, stumbling. tripping

## 2024-08-01 NOTE — PROGRESS NOTES
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 Physical exam is reassuring for lack of neurological compromise. Symptoms and physical exam findings in this complex patient and her of unclear etiology at the moment, but most likely a result of sacroiliac joint dysfunction and reactive myofascial pain/tension, possibly stemming from right knee osteoarthritis. However, lumbar radiculopathy and spondylosis are on the differential, as well as referral from ovarian distention into the posterior right knee, given her history of cervical cancer in the past.    -Left ultrasound-guided lateral femoral cutaneous nerve block performed as above.  There were no complications and she tolerated the procedure well.  She was provided with postprocedure instructions.  -Continue Topamax 100 mg twice a day  -Continue Robaxin and meloxicam as needed   -Consider other medications in the future including Lyrica, Cymbalta, nortriptyline  -Consider knee injection  in the future if needed  -Continue daily home exercises  -Do not wear SIJ brace for more than 2 to 3 hours/day.  -consider cornerloc or transloc procedures with Dr. Artis versus iFuse procedure (with Dr. Matute).   - consider neck PT and MRI in the future  -Follow-up 4-6 weeks    The patient expressed understanding and agreement with the assessment and plan. Patient encouraged to contact us should they have any questions, concerns, or any changes in symptoms.     Thank you for allowing me to participate in the care of your patient.      ** Dictated with voice recognition software, please forgive any errors in grammar and/or spelling **      Chief Complaint    Follow up on back, bilateral shoulder and right knee pain      History of Present Illness    This is a pleasant  64-year-old right-handed woman with past medical history significant for BPPV, HLD, cervical carcinoma in situ 2012 s/p hysterectomy, who presents for follow-up chronic back, shoulder, and right knee pain.    She was last seen here on 7/24/2024, at which point she was doing well following upper back trigger point injections and SI joint RFA on the left on 4/19/2023 with pain management.  But she was still having bothersome symptoms from ongoing left thigh numbness.  EMG did not look at the lateral fibrocutaneous nerve, but she is here today for a diagnostic and hopefully therapeutic ultrasound-guided lateral femoral cutaneous nerve block.    Trigger point injections to the upper back helped approximately 100%, not currently having pain. Reports her pain today is over the left SI joint and numbness/tingling over the left anterior thigh.     I advised her to continue her medications. She takes meloxicam as needed.    I advised her to continue daily exercises.    She rates her pain as a 4/10, previously 5/10.    Otherwise, there've been no changes to her medications or past medical history since the last visit.    ___________________  4/1/2020: Continue home exercises, take meloxicam as needed, Voltaren gel as needed, follow up as needed  12/7/2020: Thoracolumbar trigger point injections, resume exercises, try diclofenac instead of meloxicam and ibuprofen, try Robaxin instead of Skelaxin  1/11/2021: Start gabapentin, continue home exercises, consider MRI, follow-up in 2 months  5/10/2021: Continue exercises, continue diclofenac and Robaxin as needed, consider other medications in the future, consider injections, avoid sitting crosslegged  8/23/2021: Continue exercises, medications as needed, consider MRIs and injections in the future  2/21/2022: Lumbar and thoracic MRI ordered, continue exercises  5/10/2022: Thoracic and lumbar MRI reviewed,, thoracic trigger point injections, referral for lumbar MJ versus facet  block/RFA, continue exercises, meloxicam instead of diclofenac, continue Robaxin.  6/14/2022: Core exercises, continue medications as needed, consider repeat injections  8/29/2022: Continue medications as needed, daily home exercises focusing on active strengthening, consider repeat injections with Dr. Artis if needed  2/27/2023: Core exercises provided, continue meloxicam and Robaxin as needed, consider other medications, consider ultrasound-guided SI joint injections  6/5/2023: continue core exercises, continue meloxicam and Robaxin as needed, topamax 50 mg daily consider other medications, consider ultrasound-guided SI joint injections  10/13/2023: Left ultrasound-guided SI joint injection, bilateral thoracic trigger point injections, continue medications and exercises.  11/21/23: Continue medications, exercises, SI joint brace ordered, had a Licart provided, consider other SI joint options, follow-up for another injection in the meantime  12/5/2023: left US guided SIJ injection. Continue medications, exercises, SI joint brace ordered, had a Licart provided, consider other SI joint options  2/13/24: Prednisone taper, increase Topamax, restart meloxicam, continue exercises, do not wear brace for more than 2 to 3 hours, proceed with MRI, follow-up for tremor  3/19/24: same day visit, right upper back and neck trigger point injections, continue medications, proceed with repeat MRI, consider neck MRI, continue exercises, neck x-ray ordered   4/9/2024: Left upper back and neck trigger point injections, continue medications,  consider neck MRI, continue exercises, proceed with SI joint procedures with Dr. Artis   6/11/2024: Continue medications and exercises, EMG ordered for the left thigh, consider minimally invasive SI joint fusion procedures  7/24/24: follow up for LFCN with US, same as above   8/2/2024 : Ultrasound-guided lateral femoral cutaneous nerve block, continue medications and HEP, same as  "above    _______________  As a reminder:    TIMELINE OF COMPLAINT(S):  The patient had back surgery back in 2002, she is not sure what level, may be something involving L4, and she is not sure if there was fusion. This was for \"sciatica or arthritis\", and at the time she had tingling and weakness during down her right foot. Surgery helped, and she had no issues until this past June 2019. There are x-rays in June 2018, but the patient is adamant that it only started in 2019. She said that she was doing well from 2002 until 2019. At some point in the middle, about 6 years ago, she was told that she has scoliosis, but she had no main issues.     When she started feeling the pain come back in June 2019, she had some x-rays done and was told that her \"bones were deteriorating\". Then a few months later in August, she fell when she missed a step as she ran down the stairs, and landed on the top of her right hand, and since then it cramps up with certain movements, like when she wipes herself. The back of her knee also started hurting in June 2019, and is worse with prolonged bending and then getting up to stand.     Also in the past month, she felt the tingling \"like warm ants tingling\" going from her lower back to her right shoulder blade. This past weekend she felt pain in both posterior shoulders and neck. The left side is fine now, but she still has a dull ache in the right posterior shoulder and neck area.    The low back is the most bothersome along with the right knee. She is also had tingling in the right foot since June .    Pain:  LOCATION- low back LSJ, L=R but alternates, L groin, bilateral posterior shoulder pain , post neck and right knee and thigh posteriorly  RADIATION- Down R upper arm to elbow. no pain below knee or elbow.  ASSOCIATED WITH- no falls  NUMBNESS/TINGLING- Yes, right foot  WEAKNESS- No  CONSTANT or INTERMITTENT- Intermittent  SEVERITY/QUANTITY- 8, sitting here now 6/10 in shoulder, but none " "in back  QUALITY- Dull, achy  EXACERBATED BY- Nothing  BETTER WITH- Cold or warm compress  TRIED- Naproxen doesn’t help, flexeril doesn’t help just sleepy. , Tylenol Arthritis takes edge off, meloxicam hasn’t taken in while, medrol dose packs for respiratory issues and also helped with pain. No other meds for this    PHYSICAL THERAPY: Yes, last time was in 2002, does some HEP, no TENS  CHIROPRACTIC MANIPULATION: No  ACUPUNCTURE TREATMENTS: No  DEEP TISSUE MASSAGE THERAPY: Yes, helps temporarily  OSTEOPATHIC MANIPULATION THERAPY: No  INJECTIONS: No  EMG/NCS: No    IMAGING: Yes, LS xray 8/2018 L5-S1 disc space narrowing, no knee or neck imaging in our system.    EMG 6/20/2024:  \"The results were essentially within normal limits. There is unobtainable peroneal motor response recording at the EDB muscle, with needle exam showing chronic denervation in the left EDB. This is a nonspecific and not necessarily abnormal finding in isolation especially in this age group. No electrical evidence of lumbar radiculopathy was seen. The patient describes clinical symptoms suggestive of left meralgia paresthetica which is not detectable on EMG/nerve conduction studies. Clinical correlation recommended. \"   neck x-ray was done on 3/19/2024 and it showed mild to moderate multilevel cervical spondylosis especially at C5-C7.       [facility] pelvic MRI done on 4/4/2024:  IMPRESSION:  1.  Mild insertional tendinosis of bilateral gluteus medius tendons.  2. Mild bilateral trochanteric bursitis.  3. Mild bilateral hip osteoarthrosis.  4. Marked bilateral facet osteoarthropathy at L4-L5 and L5-S1 with  bilateral facet effusions at L4-L5. At least moderate grade bilateral  L4-5 osseous foraminal stenosis. Consider dedicated lumbar spine MRI  for further evaluation if clinically warranted.  5. Mild osteoarthrosis of the left sacroiliac joint.  6. Small amount of free fluid in the dependent pelvis, a nonspecific finding.      FUNCTIONAL HISTORY: The " patient is independent in all ADLs, mobility, and driving. The patient does not use any assistive device.    SOCIAL HISTORY:  L[facility] in: [facility]  Lives with: Spouse  Occupation: Clergy  Smoking:No  Alcohol: No  Drugs: No    ____________  ROS: The patient denies any bowel or bladder incontinence/accidents, night sweats, fevers, chills, recent significant weight loss. A 14 point review of systems was reviewed with the patient and is as above and otherwise negative. ROS questionnaire positive for joint pain, muscle spasms    Physical Exam  GEN - Alert, well-developed, well-nourished, no apparent distress  PSYCH - Cooperative, appropriate mood and affect  HEENT - NC/AT  RESP - Non-labored respirations, equal expansion  CV - well-perfused, No cyanosis or edema in extremities.   ABD- soft, ND  SKIN - No rash.    Positive Yolette and Gaenslen test on the left, significant tenderness over the left SI joint and surrounding paraspinal and gluteal musculature.  Hip range of motion itself was okay and did not reproduce hip pain.    Previous BACK/SPINE - Symmetric posture, no erythema, edema, or swelling. Full lumbar range of motion without provocation of pain symptoms. There was tenderness over both SI joints significantly, as well as bilateral gluteal muscles. No significant tenderness over the greater trochanteric bursa areas. Mild tenderness over the lumbar paraspinal muscles.. Straight leg raise negative bilaterally. Sitting slump test negative bilaterally. Femoral stretch test negative bilaterally.     Previous HIPS/PELVIS - Symmetric in standing and lying Passive hip flexion, internal rotation, and external rotation within functional normal limits bilaterally without provocation of pain symptoms. No tenderness over iliopsoas tendon.positive FABERs on the left, positive Gaenslen's on the right. Negative hip clicking. Negative hip impingement test. Negative log roll. Negative resisted active SLR. No pain with deep hip  flexion.patient was not able to do single leg sit to stand on either side    Previous NEURO - UE strength 5/5 - including shoulder abduction, biceps, triceps, wrist extensors, finger flexors, interossei, and    LE strength 5/5 - including hip flexors, knee flexors, knee extensors, ankle dorsiflexors, ankle plantar flexors, and EHL   Sensation - intact to light touch in bilateral lower and upper extremities.   Reflexes - diminished but equal biceps, brachioradialis, triceps, patellar and Achilles reflexes bilaterally No Clonus, No Babinski, Renee's negative bilaterally  GAIT - Normal base, normal stride length, non-antalgic. Able to toe walk and heel walk without difficulty.     PROCEDURE:    Lidocaine 1%- 2 cc's used, 0 cc's wasted  200mg/20mL (10mg/mL)  NDC 6925-6428-25  LOT [id number]  EXP 02/01/2025  Manuf: [employer]    Kenalog 40- 1 cc's used, 0 cc's wasted  NDC 5835-1184-01  LOT [id number]  EXP 12/2025  Manuf: [employer]        Ultrasound-guided left lateral femoral cutaneous nerve block for meralgia paresthetica    Longitudinal and transverse views of the lateral femoral cutaneous nerve were obtained and saved onto the tablet and exported to the EMR.  T FL, sartorius muscles, and ASIS were visualized. Please see exported images for details.     Description of the Procedure: The procedure, risks and alternative treatments were discussed with the patient. After written informed consent was obtained, the area overlying the lateral femoral cutaneous nerve reproduces symptoms was identified while the patient was in a supine position on the exam table. The area was marked and verified with ultrasound guidance. The skin was prepped three times with chlorhexidine scrub. Using a 25 gauge 2 inch spinal needle, after negative aspiration, the area was injected with a total of 2 cc of 1% lidocaine and 1 cc of Kenalog 40 mg/mL with ultrasound visualization of the needle tip with care to avoid touching the lateral  femoral cutaneous nerve. The patient tolerated the procedure well with no immediate complications or bleeding.    Plan:   1. The patient was instructed in post-procedural care.  2. The patient was asked to apply moist heat and or ice for the next 24 hours and to perform daily gentle stretching exercises.

## 2024-08-02 ENCOUNTER — APPOINTMENT (OUTPATIENT)
Dept: PHYSICAL MEDICINE AND REHAB | Facility: CLINIC | Age: 64
End: 2024-08-02
Payer: COMMERCIAL

## 2024-08-02 ENCOUNTER — HOSPITAL ENCOUNTER (OUTPATIENT)
Dept: RADIOLOGY | Facility: EXTERNAL LOCATION | Age: 64
Discharge: HOME | End: 2024-08-02

## 2024-08-02 VITALS
TEMPERATURE: 97.6 F | WEIGHT: 137 LBS | BODY MASS INDEX: 22.02 KG/M2 | SYSTOLIC BLOOD PRESSURE: 152 MMHG | DIASTOLIC BLOOD PRESSURE: 85 MMHG | HEIGHT: 66 IN | HEART RATE: 60 BPM | OXYGEN SATURATION: 100 %

## 2024-08-02 DIAGNOSIS — M79.602 CHRONIC PAIN OF LEFT UPPER EXTREMITY: ICD-10-CM

## 2024-08-02 DIAGNOSIS — M25.561 CHRONIC PAIN OF RIGHT KNEE: ICD-10-CM

## 2024-08-02 DIAGNOSIS — M54.6 CHRONIC THORACIC BACK PAIN, UNSPECIFIED BACK PAIN LATERALITY: ICD-10-CM

## 2024-08-02 DIAGNOSIS — Z98.890 HISTORY OF LUMBAR SURGERY: ICD-10-CM

## 2024-08-02 DIAGNOSIS — M17.11 PRIMARY OSTEOARTHRITIS OF RIGHT KNEE: ICD-10-CM

## 2024-08-02 DIAGNOSIS — G89.29 CHRONIC PAIN OF RIGHT KNEE: ICD-10-CM

## 2024-08-02 DIAGNOSIS — M54.50 CHRONIC LOW BACK PAIN, UNSPECIFIED BACK PAIN LATERALITY, UNSPECIFIED WHETHER SCIATICA PRESENT: ICD-10-CM

## 2024-08-02 DIAGNOSIS — M41.9 SCOLIOSIS, UNSPECIFIED SCOLIOSIS TYPE, UNSPECIFIED SPINAL REGION: ICD-10-CM

## 2024-08-02 DIAGNOSIS — M54.2 NECK PAIN: ICD-10-CM

## 2024-08-02 DIAGNOSIS — M46.1 SACROILIITIS (CMS-HCC): ICD-10-CM

## 2024-08-02 DIAGNOSIS — M79.18 MYOFASCIAL PAIN: ICD-10-CM

## 2024-08-02 DIAGNOSIS — G89.29 CHRONIC LOW BACK PAIN, UNSPECIFIED BACK PAIN LATERALITY, UNSPECIFIED WHETHER SCIATICA PRESENT: ICD-10-CM

## 2024-08-02 DIAGNOSIS — M54.42 LOW BACK PAIN WITH LEFT-SIDED SCIATICA, UNSPECIFIED BACK PAIN LATERALITY, UNSPECIFIED CHRONICITY: ICD-10-CM

## 2024-08-02 DIAGNOSIS — M46.1 INFLAMMATION OF SACROILIAC JOINT (CMS-HCC): ICD-10-CM

## 2024-08-02 DIAGNOSIS — M53.3 SACROILIAC JOINT DYSFUNCTION OF BOTH SIDES: ICD-10-CM

## 2024-08-02 DIAGNOSIS — G89.29 CHRONIC THORACIC BACK PAIN, UNSPECIFIED BACK PAIN LATERALITY: ICD-10-CM

## 2024-08-02 DIAGNOSIS — M54.16 LUMBAR RADICULOPATHY: ICD-10-CM

## 2024-08-02 DIAGNOSIS — G89.29 CHRONIC PAIN OF LEFT UPPER EXTREMITY: ICD-10-CM

## 2024-08-02 DIAGNOSIS — G57.11 MERALGIA PARAESTHETICA, RIGHT: Primary | ICD-10-CM

## 2024-08-02 RX ORDER — TRIAMCINOLONE ACETONIDE 40 MG/ML
40 INJECTION, SUSPENSION INTRA-ARTICULAR; INTRAMUSCULAR ONCE
Status: COMPLETED | OUTPATIENT
Start: 2024-08-02 | End: 2024-08-02

## 2024-08-02 ASSESSMENT — PAIN SCALES - GENERAL: PAINLEVEL: 4

## 2024-08-21 ENCOUNTER — TELEPHONE (OUTPATIENT)
Dept: UROLOGY | Facility: CLINIC | Age: 64
End: 2024-08-21

## 2024-08-21 DIAGNOSIS — B37.31 YEAST VAGINITIS: Primary | ICD-10-CM

## 2024-08-21 RX ORDER — FLUCONAZOLE 150 MG/1
TABLET ORAL
Qty: 2 TABLET | Refills: 0 | Status: SHIPPED | OUTPATIENT
Start: 2024-08-21

## 2024-08-21 NOTE — TELEPHONE ENCOUNTER
Pt called with concerns of a yeast infection  Asking for an order to be placed for the [facility]  [facility] is [facility]

## 2024-08-22 NOTE — TELEPHONE ENCOUNTER
FYI: Patient called in stating she was having vaginal discomfort still. I reminded her of the PFPT needed to assist or rule out this pain. She is scheduled for therapy 9/3.

## 2024-09-03 ENCOUNTER — APPOINTMENT (OUTPATIENT)
Dept: PHYSICAL THERAPY | Facility: CLINIC | Age: 64
End: 2024-09-03
Payer: COMMERCIAL

## 2024-09-10 NOTE — PROGRESS NOTES
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 Physical exam is reassuring for lack of neurological compromise. Symptoms and physical exam findings in this complex patient and her of unclear etiology at the moment, but most likely a result of sacroiliac joint dysfunction and reactive myofascial pain/tension, possibly stemming from right knee osteoarthritis. However, lumbar radiculopathy and spondylosis are on the differential, as well as referral from ovarian distention into the posterior right knee, given her history of cervical cancer in the past.    -Continue Topamax 100 mg at night but try 25 mg in the morning  -Continue Robaxin and meloxicam as needed   -Consider other medications in the future including Lyrica, Cymbalta, nortriptyline  -Consider knee injection  in the future if needed  -Continue daily home exercises  -Do not wear SIJ brace for more than 2 to 3 hours/day.  -consider cornerloc or transloc procedures with Dr. Artis versus iFuse procedure (with Dr. Matute).  Follow-up with him to discuss an L4-5 injection as well  -Proceed with physical therapy.  -Lumbar MRI ordered  -Follow-up after MRI and injection with Dr. Artis    The patient expressed understanding and agreement with the assessment and plan. Patient encouraged to contact us should they have any questions, concerns, or any changes in symptoms.     Thank you for allowing me to participate in the care of your patient.      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    She was last seen here on 8/2/24, at which point I did  an Ultrasound-guided lateral femoral cutaneous nerve block, unfortunately this did not help at all.  And at this point, her pain has now extended beyond her knee down to the anterior lower leg, and an L4-5 distribution.  Previous L3-L4 injection did not help.    I advised her to continue her medications. She takes meloxicam as needed. She decreased topamax to 100 mg at night only instead of 100 BID because she was losing too much weight, but she is also noticed that her pain has increased since then.  She will try 25 mg in the morning..     I advised her to continue daily exercises and not to wear her SI joint brace for more than 2 to 3 hours/day.    She is starting PT on 9/19/24. She last did PT 2 years ago but has been doing her exercises diligently.  Her last lumbar MRI was in 2022 2.5 years ago in 2022    She rates her pain as a 5/10, previously 4/10.    Otherwise, there've been no changes to her medications or past medical history since the last visit.    ___________________  4/1/2020: Continue home exercises, take meloxicam as needed, Voltaren gel as needed, follow up as needed  12/7/2020: Thoracolumbar trigger point injections, resume exercises, try diclofenac instead of meloxicam and ibuprofen, try Robaxin instead of Skelaxin  1/11/2021: Start gabapentin, continue home exercises, consider MRI, follow-up in 2 months  5/10/2021: Continue exercises, continue diclofenac and Robaxin as needed, consider other medications in the future, consider injections, avoid sitting crosslegged  8/23/2021: Continue exercises, medications as needed, consider MRIs and injections in the future  2/21/2022: Lumbar and thoracic MRI ordered, continue exercises  5/10/2022: Thoracic and lumbar MRI reviewed,, thoracic trigger point injections, referral for  lumbar MJ versus facet block/RFA, continue exercises, meloxicam instead of diclofenac, continue Robaxin.  6/14/2022: Core exercises, continue medications as needed, consider repeat injections  8/29/2022: Continue medications as needed, daily home exercises focusing on active strengthening, consider repeat injections with Dr. Artis if needed  2/27/2023: Core exercises provided, continue meloxicam and Robaxin as needed, consider other medications, consider ultrasound-guided SI joint injections  6/5/2023: continue core exercises, continue meloxicam and Robaxin as needed, topamax 50 mg daily consider other medications, consider ultrasound-guided SI joint injections  10/13/2023: Left ultrasound-guided SI joint injection, bilateral thoracic trigger point injections, continue medications and exercises.  11/21/23: Continue medications, exercises, SI joint brace ordered, had a Licart provided, consider other SI joint options, follow-up for another injection in the meantime  12/5/2023: left US guided SIJ injection. Continue medications, exercises, SI joint brace ordered, had a Licart provided, consider other SI joint options  2/13/24: Prednisone taper, increase Topamax, restart meloxicam, continue exercises, do not wear brace for more than 2 to 3 hours, proceed with MRI, follow-up for tremor  3/19/24: same day visit, right upper back and neck trigger point injections, continue medications, proceed with repeat MRI, consider neck MRI, continue exercises, neck x-ray ordered   4/9/2024: Left upper back and neck trigger point injections, continue medications,  consider neck MRI, continue exercises, proceed with SI joint procedures with Dr. Artis   6/11/2024: Continue medications and exercises, EMG ordered for the left thigh, consider minimally invasive SI joint fusion procedures  7/24/24: follow up for LFCN with US, same as above   8/2/2024 : Ultrasound-guided lateral femoral cutaneous nerve block, continue medications and HEP,  "same as above  9/11/2024: Take Topamax 25 mg in the morning 100 mg at night, consider other medications, continue exercises, proceed with PT, lumbar MRI ordered, follow-up with Dr. Artis for L4 5 injection  _______________  As a reminder:    TIMELINE OF COMPLAINT(S):  The patient had back surgery back in 2002, she is not sure what level, may be something involving L4, and she is not sure if there was fusion. This was for \"sciatica or arthritis\", and at the time she had tingling and weakness during down her right foot. Surgery helped, and she had no issues until this past June 2019. There are x-rays in June 2018, but the patient is adamant that it only started in 2019. She said that she was doing well from 2002 until 2019. At some point in the middle, about 6 years ago, she was told that she has scoliosis, but she had no main issues.     When she started feeling the pain come back in June 2019, she had some x-rays done and was told that her \"bones were deteriorating\". Then a few months later in August, she fell when she missed a step as she ran down the stairs, and landed on the top of her right hand, and since then it cramps up with certain movements, like when she wipes herself. The back of her knee also started hurting in June 2019, and is worse with prolonged bending and then getting up to stand.     Also in the past month, she felt the tingling \"like warm ants tingling\" going from her lower back to her right shoulder blade. This past weekend she felt pain in both posterior shoulders and neck. The left side is fine now, but she still has a dull ache in the right posterior shoulder and neck area.    The low back is the most bothersome along with the right knee. She is also had tingling in the right foot since June .    Pain:  LOCATION- low back LSJ, L=R but alternates, L groin, bilateral posterior shoulder pain , post neck and right knee and thigh posteriorly  RADIATION- Down R upper arm to elbow. no pain below " "knee or elbow.  ASSOCIATED WITH- no falls  NUMBNESS/TINGLING- Yes, right foot  WEAKNESS- No  CONSTANT or INTERMITTENT- Intermittent  SEVERITY/QUANTITY- 8, sitting here now 6/10 in shoulder, but none in back  QUALITY- Dull, achy  EXACERBATED BY- Nothing  BETTER WITH- Cold or warm compress  TRIED- Naproxen doesn’t help, flexeril doesn’t help just sleepy. , Tylenol Arthritis takes edge off, meloxicam hasn’t taken in while, medrol dose packs for respiratory issues and also helped with pain. No other meds for this    PHYSICAL THERAPY: Yes, last time was in 2002, does some HEP, no TENS  CHIROPRACTIC MANIPULATION: No  ACUPUNCTURE TREATMENTS: No  DEEP TISSUE MASSAGE THERAPY: Yes, helps temporarily  OSTEOPATHIC MANIPULATION THERAPY: No  INJECTIONS: No  EMG/NCS: No    IMAGING: Yes, LS xray 8/2018 L5-S1 disc space narrowing, no knee or neck imaging in our system.    EMG 6/20/2024:  \"The results were essentially within normal limits. There is unobtainable peroneal motor response recording at the EDB muscle, with needle exam showing chronic denervation in the left EDB. This is a nonspecific and not necessarily abnormal finding in isolation especially in this age group. No electrical evidence of lumbar radiculopathy was seen. The patient describes clinical symptoms suggestive of left meralgia paresthetica which is not detectable on EMG/nerve conduction studies. Clinical correlation recommended. \"   neck x-ray was done on 3/19/2024 and it showed mild to moderate multilevel cervical spondylosis especially at C5-C7.       [facility] [place] pelvic MRI done on 4/4/2024:  IMPRESSION:  1.  Mild insertional tendinosis of bilateral gluteus medius tendons.  2. Mild bilateral trochanteric bursitis.  3. Mild bilateral hip osteoarthrosis.  4. Marked bilateral facet osteoarthropathy at L4-L5 and L5-S1 with  bilateral facet effusions at L4-L5. At least moderate grade bilateral  L4-5 osseous foraminal stenosis. Consider dedicated lumbar spine MRI  for further " evaluation if clinically warranted.  5. Mild osteoarthrosis of the left sacroiliac joint.  6. Small amount of free fluid in the dependent pelvis, a nonspecific finding.      FUNCTIONAL HISTORY: The patient is independent in all ADLs, mobility, and driving. The patient does not use any assistive device.    SOCIAL HISTORY:  L[facility] in: [facility]  Lives with: Spouse  Occupation: Clergy  Smoking:No  Alcohol: No  Drugs: No    ____________  ROS: The patient denies any bowel or bladder incontinence/accidents, night sweats, fevers, chills, recent significant weight loss. A 14 point review of systems was reviewed with the patient and is as above and otherwise negative. ROS questionnaire positive for joint pain, back pain      Physical Exam  GEN - Alert, well-developed, well-nourished, no apparent distress  PSYCH - Cooperative, appropriate mood and affect  HEENT - NC/AT  RESP - Non-labored respirations, equal expansion  CV - well-perfused, No cyanosis or edema in extremities.   ABD- soft, ND  SKIN - No rash.    Positive Yolette and Gaenslen test on the left, significant tenderness over the left SI joint and surrounding paraspinal and gluteal musculature.  Hip range of motion itself was okay and did not reproduce hip pain.    Previous BACK/SPINE - Symmetric posture, no erythema, edema, or swelling. Full lumbar range of motion without provocation of pain symptoms. There was tenderness over both SI joints significantly, as well as bilateral gluteal muscles. No significant tenderness over the greater trochanteric bursa areas. Mild tenderness over the lumbar paraspinal muscles.. Straight leg raise negative bilaterally. Sitting slump test negative bilaterally. Femoral stretch test negative bilaterally.     Previous HIPS/PELVIS - Symmetric in standing and lying Passive hip flexion, internal rotation, and external rotation within functional normal limits bilaterally without provocation of pain symptoms. No tenderness over iliopsoas  tendon.positive FABERs on the left, positive Gaenslen's on the right. Negative hip clicking. Negative hip impingement test. Negative log roll. Negative resisted active SLR. No pain with deep hip flexion.patient was not able to do single leg sit to stand on either side    Previous NEURO - UE strength 5/5 - including shoulder abduction, biceps, triceps, wrist extensors, finger flexors, interossei, and    LE strength 5/5 - including hip flexors, knee flexors, knee extensors, ankle dorsiflexors, ankle plantar flexors, and EHL   Sensation - intact to light touch in bilateral lower and upper extremities.   Reflexes - diminished but equal biceps, brachioradialis, triceps, patellar and Achilles reflexes bilaterally No Clonus, No Babinski, Renee's negative bilaterally  GAIT - Normal base, normal stride length, non-antalgic. Able to toe walk and heel walk without difficulty.

## 2024-09-10 NOTE — PATIENT INSTRUCTIONS
-Continue Topamax 100 mg at night but try 25 mg in the morning  -Continue Robaxin and meloxicam as needed   -Consider other medications in the future including Lyrica, Cymbalta, nortriptyline  -Consider knee injection  in the future if needed  -Continue daily home exercises  -Do not wear SIJ brace for more than 2 to 3 hours/day.  -consider cornerloc or transloc procedures with Dr. Artis versus iFuse procedure (with Dr. Matute).  Follow-up with him to discuss an L4-5 injection as well  -Proceed with physical therapy.  -Lumbar MRI ordered  -Follow-up after MRI and injection with Dr. Artis

## 2024-09-11 ENCOUNTER — APPOINTMENT (OUTPATIENT)
Dept: PHYSICAL MEDICINE AND REHAB | Facility: CLINIC | Age: 64
End: 2024-09-11
Payer: COMMERCIAL

## 2024-09-11 VITALS
TEMPERATURE: 97.4 F | OXYGEN SATURATION: 98 % | SYSTOLIC BLOOD PRESSURE: 153 MMHG | WEIGHT: 133 LBS | DIASTOLIC BLOOD PRESSURE: 85 MMHG | HEIGHT: 66 IN | HEART RATE: 68 BPM | BODY MASS INDEX: 21.38 KG/M2

## 2024-09-11 DIAGNOSIS — G89.29 CHRONIC LOW BACK PAIN, UNSPECIFIED BACK PAIN LATERALITY, UNSPECIFIED WHETHER SCIATICA PRESENT: ICD-10-CM

## 2024-09-11 DIAGNOSIS — M54.50 CHRONIC LOW BACK PAIN, UNSPECIFIED BACK PAIN LATERALITY, UNSPECIFIED WHETHER SCIATICA PRESENT: ICD-10-CM

## 2024-09-11 DIAGNOSIS — M53.3 SACROILIAC JOINT DYSFUNCTION OF BOTH SIDES: ICD-10-CM

## 2024-09-11 DIAGNOSIS — G89.29 CHRONIC PAIN OF RIGHT KNEE: ICD-10-CM

## 2024-09-11 DIAGNOSIS — M54.16 LUMBAR RADICULOPATHY: ICD-10-CM

## 2024-09-11 DIAGNOSIS — M79.602 CHRONIC PAIN OF LEFT UPPER EXTREMITY: ICD-10-CM

## 2024-09-11 DIAGNOSIS — M54.6 CHRONIC THORACIC BACK PAIN, UNSPECIFIED BACK PAIN LATERALITY: ICD-10-CM

## 2024-09-11 DIAGNOSIS — M79.18 MYOFASCIAL PAIN: ICD-10-CM

## 2024-09-11 DIAGNOSIS — M41.9 SCOLIOSIS, UNSPECIFIED SCOLIOSIS TYPE, UNSPECIFIED SPINAL REGION: ICD-10-CM

## 2024-09-11 DIAGNOSIS — G57.11 MERALGIA PARAESTHETICA, RIGHT: ICD-10-CM

## 2024-09-11 DIAGNOSIS — M54.42 LOW BACK PAIN WITH LEFT-SIDED SCIATICA, UNSPECIFIED BACK PAIN LATERALITY, UNSPECIFIED CHRONICITY: ICD-10-CM

## 2024-09-11 DIAGNOSIS — M17.11 PRIMARY OSTEOARTHRITIS OF RIGHT KNEE: ICD-10-CM

## 2024-09-11 DIAGNOSIS — M46.1 SACROILIITIS (CMS-HCC): ICD-10-CM

## 2024-09-11 DIAGNOSIS — G89.29 CHRONIC THORACIC BACK PAIN, UNSPECIFIED BACK PAIN LATERALITY: ICD-10-CM

## 2024-09-11 DIAGNOSIS — M54.2 NECK PAIN: Primary | ICD-10-CM

## 2024-09-11 DIAGNOSIS — M46.1 INFLAMMATION OF SACROILIAC JOINT (CMS-HCC): ICD-10-CM

## 2024-09-11 DIAGNOSIS — Z98.890 HISTORY OF LUMBAR SURGERY: ICD-10-CM

## 2024-09-11 DIAGNOSIS — G89.29 CHRONIC PAIN OF LEFT UPPER EXTREMITY: ICD-10-CM

## 2024-09-11 DIAGNOSIS — M25.561 CHRONIC PAIN OF RIGHT KNEE: ICD-10-CM

## 2024-09-11 PROCEDURE — 3008F BODY MASS INDEX DOCD: CPT | Performed by: PHYSICAL MEDICINE & REHABILITATION

## 2024-09-11 PROCEDURE — 99214 OFFICE O/P EST MOD 30 MIN: CPT | Performed by: PHYSICAL MEDICINE & REHABILITATION

## 2024-09-11 RX ORDER — TOPIRAMATE 25 MG/1
25 TABLET ORAL DAILY
Qty: 30 TABLET | Refills: 3 | Status: SHIPPED | OUTPATIENT
Start: 2024-09-11 | End: 2025-01-09

## 2024-09-11 RX ORDER — TOPIRAMATE 100 MG/1
100 TABLET, FILM COATED ORAL DAILY
Qty: 30 TABLET | Refills: 0 | Status: SHIPPED | OUTPATIENT
Start: 2024-09-11 | End: 2024-10-11

## 2024-09-11 ASSESSMENT — PAIN SCALES - GENERAL: PAINLEVEL: 5

## 2024-09-12 ENCOUNTER — LAB (OUTPATIENT)
Dept: LAB | Facility: LAB | Age: 64
End: 2024-09-12
Payer: COMMERCIAL

## 2024-09-12 DIAGNOSIS — E78.2 MIXED HYPERLIPIDEMIA: Primary | ICD-10-CM

## 2024-09-12 LAB
ALBUMIN SERPL BCP-MCNC: 4.4 G/DL (ref 3.4–5)
ALP SERPL-CCNC: 60 U/L (ref 33–136)
ALT SERPL W P-5'-P-CCNC: 18 U/L (ref 7–45)
ANION GAP SERPL CALC-SCNC: 14 MMOL/L (ref 10–20)
AST SERPL W P-5'-P-CCNC: 18 U/L (ref 9–39)
BILIRUB SERPL-MCNC: 0.6 MG/DL (ref 0–1.2)
BUN SERPL-MCNC: 13 MG/DL (ref 6–23)
CALCIUM SERPL-MCNC: 9.6 MG/DL (ref 8.6–10.6)
CHLORIDE SERPL-SCNC: 107 MMOL/L (ref 98–107)
CHOLEST SERPL-MCNC: 376 MG/DL (ref 0–199)
CHOLESTEROL/HDL RATIO: 4.9
CK SERPL-CCNC: 117 U/L (ref 0–215)
CO2 SERPL-SCNC: 26 MMOL/L (ref 21–32)
CREAT SERPL-MCNC: 1.04 MG/DL (ref 0.5–1.05)
EGFRCR SERPLBLD CKD-EPI 2021: 60 ML/MIN/1.73M*2
ERYTHROCYTE [DISTWIDTH] IN BLOOD BY AUTOMATED COUNT: 14.1 % (ref 11.5–14.5)
GLUCOSE SERPL-MCNC: 88 MG/DL (ref 74–99)
HCT VFR BLD AUTO: 44.5 % (ref 36–46)
HDLC SERPL-MCNC: 76.7 MG/DL
HGB BLD-MCNC: 14.3 G/DL (ref 12–16)
LDLC SERPL CALC-MCNC: 283 MG/DL
MCH RBC QN AUTO: 27.8 PG (ref 26–34)
MCHC RBC AUTO-ENTMCNC: 32.1 G/DL (ref 32–36)
MCV RBC AUTO: 87 FL (ref 80–100)
NON HDL CHOLESTEROL: 299 MG/DL (ref 0–149)
NRBC BLD-RTO: 0 /100 WBCS (ref 0–0)
PLATELET # BLD AUTO: 316 X10*3/UL (ref 150–450)
POTASSIUM SERPL-SCNC: 4.6 MMOL/L (ref 3.5–5.3)
PROT SERPL-MCNC: 6.7 G/DL (ref 6.4–8.2)
RBC # BLD AUTO: 5.14 X10*6/UL (ref 4–5.2)
SODIUM SERPL-SCNC: 142 MMOL/L (ref 136–145)
TRIGL SERPL-MCNC: 81 MG/DL (ref 0–149)
VLDL: 16 MG/DL (ref 0–40)
WBC # BLD AUTO: 2.9 X10*3/UL (ref 4.4–11.3)

## 2024-09-12 PROCEDURE — 85027 COMPLETE CBC AUTOMATED: CPT

## 2024-09-12 PROCEDURE — 36415 COLL VENOUS BLD VENIPUNCTURE: CPT

## 2024-09-12 PROCEDURE — 80061 LIPID PANEL: CPT

## 2024-09-12 PROCEDURE — 80053 COMPREHEN METABOLIC PANEL: CPT

## 2024-09-12 PROCEDURE — 82550 ASSAY OF CK (CPK): CPT

## 2024-09-19 ENCOUNTER — EVALUATION (OUTPATIENT)
Dept: PHYSICAL THERAPY | Facility: CLINIC | Age: 64
End: 2024-09-19
Payer: COMMERCIAL

## 2024-09-19 DIAGNOSIS — N81.89 PELVIC FLOOR WEAKNESS: ICD-10-CM

## 2024-09-19 DIAGNOSIS — R10.2 PELVIC PAIN: ICD-10-CM

## 2024-09-19 PROCEDURE — 97161 PT EVAL LOW COMPLEX 20 MIN: CPT | Mod: GP | Performed by: PHYSICAL THERAPIST

## 2024-09-19 PROCEDURE — 97110 THERAPEUTIC EXERCISES: CPT | Mod: GP | Performed by: PHYSICAL THERAPIST

## 2024-09-19 PROCEDURE — 97535 SELF CARE MNGMENT TRAINING: CPT | Mod: GP | Performed by: PHYSICAL THERAPIST

## 2024-09-19 NOTE — PROGRESS NOTES
Physical Therapy    PELVIC FLOOR EVALUATION AND TREATMENT    Name: Lilli Aldridge  MRN: [id number]  : 1960  Today's Date: 24     Time Calculation  Start Time: 1005  Stop Time: 1100  Time Calculation (min): 55 min    PT Evaluation Time Entry  PT Evaluation (Low) Time Entry: 25  PT Therapeutic Procedures Time Entry  Therapeutic Exercise Time Entry: 10  Self-Care/Home Mgmt Training: 15       Visit: 1   Insurance: [facility]  Auth: No  20 visit max     Assessment:   Pt presents to clinic with chief complaint of L lower abdominal and pelvic pain with associated radicular L LE pain that is acute on chronic. Pt demonstrates increased pelvic floor tension with multiple tender points at levator ani group on the L with palpation. Pt will benefit from skilled therapy to address current impairments for improved ability to return to regular activity with reduced pain         Plan:   Treatment/Interventions: Cryotherapy, Education/ Instruction, Gait training, Hot pack, Manual therapy, Neuromuscular re-education, Self care/ home management, Therapeutic activities, Therapeutic exercises  PT Plan: Skilled PT  PT Frequency: 1 time per week  Duration: 6 weeks  Rehab Potential: Good  Plan of Care Agreement: Patient      Current Problem:  Problem List Items Addressed This Visit             ICD-10-CM    Pelvic floor weakness N81.89    Relevant Orders    Follow Up In Physical Therapy     Other Visit Diagnoses         Codes    Pelvic pain     R10.2    Relevant Orders    Follow Up In Physical Therapy              Subjective   General  Reason for Referral: Pelvic pain, L sided  Referred By: Dr. Harshad Polanco  Preferred Learning Style: verbal, visual, written  Precautions  STEADI Fall Risk Score (The score of 4 or more indicates an increased risk of falling): 0  Precautions Comment: None       Objective   PELVIC HISTORY:    Chief Complaint/Description of Symptoms:   Pt presents to clinic with chief complaint of L sided abdominal,  pelvic pain that is acute on chronic        HPI:   Pt reports pelvic pain and abdominal pain have been ongoing since 2022. She was previously seen in PFPT for urinary symptoms; at this time she reports no significant symptoms   She has consistent L sided pelvic and abdominal pain as well as L sided low back and LE pain     Home Environment/Social Factors/Occupation:   No hx of pelvic or abdominal surgeries       PELVIC PAIN:     Location: L sided abdominal pain, L LE pain, anterior thigh   Description: Achy  Pain scale: 5-8/10  Duration: constant, varies in intensity  Pt reports nothing specifically alleviates pain and nothing specifically aggravates pain symptoms     Since onset, the symptoms are: unchanged   Pain when emptying bladder: No   Pain with wiping or tight clothing: No  Pain with intercourse: No; not currently sexually active   History of back pain: Yes     EXERCISE:  Do you do Kegels? No    Current exercise regime: No     BLADDER:     Excessive Urinary Urgency: No  Daytime Voiding Frequency: WNL  Nighttime Voiding Frequency: 1x/night   Unintentional urine loss frequency: No   Leakage occurs with: N/A   Leakage amount: N/A  Difficulty initiating flow of urine: No  Slow/weak urine stream: No  Difficulty starting urine stream/push to urinate: No  Able to completely empty bladder: Yes  Tests performed by doctor: Internal per Dr. Polanco  Frequent UTI's: No     BOWEL:     Excessive Bowel Urgency: No   BM Frequency: Daily   Frequent Diarrhea: No   Frequent constipation/straining/incomplete emptying: No significant constipation, straining   Gaston Stool Scale rating: Type 4   Unintentional stool loss: No       EXTERNAL OBSERVATION:  Voluntary Contraction: Present   Voluntary Relaxation: Present  Involuntary Contraction: Present  Involuntary Relaxation Present         INTERNAL VAGINAL EXAMINATION:  PFM Strength: 3/5  Coordination: NT   Endurance: NT    INTERNAL PALPATION:   Pain and tenderness to palpation L  levator ani group; radiating pain into L lower abdomen with palpation of L OI       EXTERNAL PALPATION:  Levator Ani: Mild Pain/Tightness R, Mild Pain/Tightness L  Bulbo: No Pain/Tightness R, no Pain/Tightness L  Ischiocavernosus: No Pain/Tightness R, No Pain/Tightness L  Transverse perineum: No Pain/Tightness R, No Pain/Tightness L      OUTCOMES MEASURE:  Female NIH Chronic Prostatitis symptom index: 0    Education:  PFPT   PF anatomy  Radicular nature of symptoms and tender points/trigger points     TREATMENT  Therapeutic exercise:  Reverse clamshells x 10   Seated hip IR x 10  Quadruped rock back with hip IR x 10    Self-care:  Review and instruction of tender point release at home  Review of how to use pelvic wand at home  Provided pt with handout     Careplan Goals:  Problem: PT Problem       Goal: Pt will report 50% reduction in frequency and intensity of L sided pelvic pain          Goal: Pt will demonstrate reduced pelvic floor tension for reduced pain with palpation           Goal: Pt will be independent in manual self-stretching techniques for home maintenance           Goal: Pt will be independent in HEP for home maintenance            Sushil Herman, PT

## 2024-09-20 ASSESSMENT — ENCOUNTER SYMPTOMS
LOSS OF SENSATION IN FEET: 0
DEPRESSION: 0
OCCASIONAL FEELINGS OF UNSTEADINESS: 0

## 2024-09-23 ENCOUNTER — OFFICE VISIT (OUTPATIENT)
Dept: PAIN MEDICINE | Facility: HOSPITAL | Age: 64
End: 2024-09-23
Payer: COMMERCIAL

## 2024-09-23 DIAGNOSIS — M46.1 SACROILIITIS (CMS-HCC): ICD-10-CM

## 2024-09-23 DIAGNOSIS — M54.16 LUMBAR RADICULOPATHY: Primary | ICD-10-CM

## 2024-09-23 PROCEDURE — 99214 OFFICE O/P EST MOD 30 MIN: CPT | Performed by: PAIN MEDICINE

## 2024-09-23 RX ORDER — MELOXICAM 7.5 MG/1
7.5 TABLET ORAL DAILY
COMMUNITY

## 2024-09-23 ASSESSMENT — PAIN SCALES - GENERAL: PAINLEVEL: 7

## 2024-09-23 NOTE — PROGRESS NOTES
Subjective   Patient ID: Lilli Aldridge is a 64 y.o. 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       HPI:   Lilli Aldridge is a 64 y.o. 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 Patient is s/p bilateral sacroiliac joint radiofrequency ablation on April 19, 2024.  Patient had previously undergone a left-sided L3 and L4 transforaminal epidural steroid injection on December 26, 2023.  Patient says that her pain tobindu Arias is mostly located to the left lower extremity and that it courses down the anterior aspect of her left lower extremity.  Patient describes her pain is about 6-7 out of 10 in intensity and dull in nature.  Patient says she takes Topamax 125 mg daily as well as meloxicam and acetaminophen as needed for additional pain relief. Patient says she also utilizes heating and icing for additional relief.    Physical Therapy: The patient has done six or more weeks of physical therapy in the past six months with minimal improvement  Other Conservative Measures she has tried: Heating Pad, Ice, and Injections  Classes of medications tried in the past: Acetaminophen, NSAIDs, and Antiepileptic agents        Review of Systems   13-point ROS done and negative except for HPI.     Current Outpatient Medications   Medication Instructions    acetaminophen (Tylenol 8 HOUR) 650 mg ER tablet 1 tablet, oral, 3-4 times daily as needed for pain    acetaminophen (TylenoL) 325 mg tablet 2 tablets, oral, Every 6 hours PRN    albuterol 90 mcg/actuation inhaler 2 puffs, inhalation, Every 6 hours PRN    calcium carbonate-vitamin D3 1,000 mg-20 mcg (800 unit) tablet Calcium 1000 + D 1000-800 MG-UNIT Oral Tablet Take 1 tablet daily Quantity: 0 Refills: 0 Ordered: 2-Sep-2021 Rod ESCALANTE, Miracle Burkett  : 2-Sep-2021 Active    chlorpheniramine (Chlor-Trimeton) 4 mg tablet 1 tablet, oral, Every 4-6 hours as needed.    fluconazole (Diflucan) 150 mg tablet Take one tablet now, wait three days and then take second tablet for vaginal discomfort.    flunisolide (Nasalide) 25 mcg (0.025 %) spray,non-aerosol 2 sprays, Each Nostril, 2 times daily    loratadine (Claritin) 10 mg tablet 1 tablet, oral, Daily    meclizine (Antivert) 12.5 mg tablet 1 tablet, oral, 3 times daily PRN    meloxicam (MOBIC) 7.5 mg, oral, Daily    omeprazole (PriLOSEC) 20 mg DR capsule 1 capsule, oral, Daily before breakfast    topiramate (TOPAMAX) 100 mg, oral, Daily    topiramate (TOPAMAX) 25 mg, oral, Daily, Patient is now taking 25 mg in the morning and 100 mg at night    vit C-Zn gluc-herbal no.325 (Elderberry Zinc Vit C) 90-15 mg lozenge 1 lozenge, oral, Daily       Past Medical History:   Diagnosis Date    Cervical high risk human papillomavirus (HPV) DNA test positive     Cervical high risk HPV (human papillomavirus) test positive    Cough variant asthma ([facility]) 11/25/2017    Cough variant asthma    Lateral epicondylitis, unspecified elbow     Lateral epicondylitis (tennis elbow)    Personal history of diseases of the blood and blood-forming organs and certain disorders involving the immune mechanism     History of neutropenia    Personal history of other diseases of the musculoskeletal system and connective tissue     History of scoliosis    Urge incontinence 09/16/2022    Urge incontinence    Urinary tract infection, site not specified 09/15/2022    Recurrent UTI        Past Surgical History:   Procedure Laterality Date    BACK SURGERY  07/06/2016    Back Surgery    HYSTERECTOMY  07/06/2016    Hysterectomy    MOUTH SURGERY  11/09/2017    Oral Surgery Tooth Extraction    OTHER SURGICAL HISTORY  11/09/2017    Cervical Conization By Cold Knife    TONSILLECTOMY  11/09/2017    Tonsillectomy    TUBAL LIGATION  07/06/2016    Tubal Ligation         Family History   Problem Relation Name Age of Onset    Other (Diabetes mellitus) Mother      Hypertension Mother      Cataracts Mother      Glaucoma Mother      Other (Diabetes mellitus) Father      Hypertension Father      Liver cancer Father      Cataracts Father      Glaucoma Father      Decreased libido Sister          Allergies   Allergen Reactions    Penicillins Hives and Unknown     Nausea    Cat Dander Unknown    Dog Dander Unknown    Erythromycin Other, Nausea And Vomiting, Nausea Only and Nausea/vomiting        Objective     There were no vitals filed for this visit.     Physical Exam  General: NAD, well groomed, well nourished  Eyes: Non-icteric sclera, EOMI  Ears, Nose, Mouth, and Throat: External ears and nose appear to be without deformity or rash. No lesions or masses noted. Hearing is grossly intact.   Neck: Trachea midline  Respiratory: Nonlabored breathing   Cardiovascular: No significant peripheral edema noted   Skin: No rashes or open lesions/ulcers identified on skin.    Palpation: No tenderness to palpation over lumbar paraspinous muscles.   Straight leg raise: positive at 45 degrees on the left  NEDA Maneuver does reproduce pain on the left    Hip: No pain over greater trochanters.    Neurologic:   Cranial nerves grossly intact.   Strength 5/5 and symmetric plantar/dorsiflexion   Sensation: Normal to light touch throughout  DTRs: normal and symmetric throughout  Renee: absent    Psychiatric: Alert, orientation to person, place, and time. Cooperative.    Imaging personally reviewed and independently interpreted    Assessment/Plan   Lilli Aldridge is a 64 y.o. 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     Plan:  -Patient to be scheduled for open MRI  -We will consider epidural steroid injection after reviewing results of the new lumbar MRI    Medical Necessity  The  patient has failed conservative treatment including different classes of medications and physical therapy.  Patient continues to rate their pain as moderate to severe, affecting quality of sleep, quality of life and  significantly impairing daily activities (ADLs)   We discussed  the risks, benefits and alternatives of the procedure including but not limited to: Lack of efficacy Transiently worsening pain , Bleeding, Infection , and Nerve Damage and patient is amicable to the plan    Follow up: As needed     The patient was invited to contact us back anytime with any questions or concerns and follow-up with us in the office as needed.     Diagnoses and all orders for this visit:  Sacroiliitis (CMS-HCC)  Lumbar radiculopathy  -     MR lumbar spine wo IV contrast; Future      This note was generated with the aid of dictation software, there may be typos despite my attempts at proofreading.

## 2024-10-03 ENCOUNTER — APPOINTMENT (OUTPATIENT)
Dept: PHYSICAL THERAPY | Facility: CLINIC | Age: 64
End: 2024-10-03
Payer: COMMERCIAL

## 2024-10-03 ENCOUNTER — TREATMENT (OUTPATIENT)
Dept: PHYSICAL THERAPY | Facility: CLINIC | Age: 64
End: 2024-10-03
Payer: COMMERCIAL

## 2024-10-03 DIAGNOSIS — N81.89 PELVIC FLOOR WEAKNESS: Primary | ICD-10-CM

## 2024-10-03 DIAGNOSIS — R10.2 PELVIC PAIN IN FEMALE: ICD-10-CM

## 2024-10-03 PROCEDURE — 97140 MANUAL THERAPY 1/> REGIONS: CPT | Mod: GP | Performed by: PHYSICAL THERAPIST

## 2024-10-03 PROCEDURE — 97535 SELF CARE MNGMENT TRAINING: CPT | Mod: GP | Performed by: PHYSICAL THERAPIST

## 2024-10-03 NOTE — PROGRESS NOTES
Physical Therapy    PELVIC FLOOR TREATMENT    Name: Lilli Aldridge  MRN: [id number]  : 1960  Today's Date: 10/03/24     Time Calculation  Start Time: 1400  Stop Time: 1450  Time Calculation (min): 50 min       PT Therapeutic Procedures Time Entry  Manual Therapy Time Entry: 25  Self-Care/Home Mgmt Training: 15       Visit: 2  Insurance: [facility]  Auth: No  20 visit max     Assessment:   Pt tolerated manual well. Continues to note tenderness with internal palpation and radiation into L lower abdomen  Pt scheduled for MRI next week          Plan:   Continue with manual       Current Problem:  Problem List Items Addressed This Visit             ICD-10-CM    Pelvic floor weakness - Primary N81.89    Pelvic pain in female R10.2           Subjective   Pain 6/10 today  Exercises have been helping slightly        Objective     EXTERNAL OBSERVATION:  Voluntary Contraction: Present   Voluntary Relaxation: Present  Involuntary Contraction: Present  Involuntary Relaxation Present         INTERNAL VAGINAL EXAMINATION:  PFM Strength: 3/5  Coordination: NT   Endurance: NT    INTERNAL PALPATION:   Pain and tenderness to palpation L levator ani group; radiating pain into L lower abdomen with palpation of L OI       EXTERNAL PALPATION:  Levator Ani: Mild Pain/Tightness R, Mild Pain/Tightness L  Bulbo: No Pain/Tightness R, no Pain/Tightness L  Ischiocavernosus: No Pain/Tightness R, No Pain/Tightness L  Transverse perineum: No Pain/Tightness R, No Pain/Tightness L      TREATMENT  Manual therapy:  Internal manual release of levator ani L and OI L     Self-Care:  Instruction on pelvic wand use  Demonstration of pelvic wand use and palpation of deep PF musculature     Reivew-   Therapeutic exercise:  Reverse clamshells x 10   Seated hip IR x 10  Quadruped rock back with hip IR x 10      Careplan Goals:  Problem: PT Problem       Goal: Pt will report 50% reduction in frequency and intensity of L sided pelvic pain          Goal: Pt will  demonstrate reduced pelvic floor tension for reduced pain with palpation           Goal: Pt will be independent in manual self-stretching techniques for home maintenance           Goal: Pt will be independent in HEP for home maintenance            Sushil Herman, PT

## 2024-10-07 ENCOUNTER — APPOINTMENT (OUTPATIENT)
Dept: PHYSICAL THERAPY | Facility: CLINIC | Age: 64
End: 2024-10-07
Payer: COMMERCIAL

## 2024-10-07 ENCOUNTER — TREATMENT (OUTPATIENT)
Dept: PHYSICAL THERAPY | Facility: CLINIC | Age: 64
End: 2024-10-07
Payer: COMMERCIAL

## 2024-10-07 DIAGNOSIS — N81.89 PELVIC FLOOR WEAKNESS: Primary | ICD-10-CM

## 2024-10-07 DIAGNOSIS — R10.2 PELVIC PAIN IN FEMALE: ICD-10-CM

## 2024-10-07 PROCEDURE — 97140 MANUAL THERAPY 1/> REGIONS: CPT | Mod: GP | Performed by: PHYSICAL THERAPIST

## 2024-10-07 NOTE — PROGRESS NOTES
Physical Therapy    PELVIC FLOOR TREATMENT    Name: Lilli Aldridge  MRN: [id number]  : 1960  Today's Date: 10/07/24     Time Calculation  Start Time: 1005  Stop Time: 1045  Time Calculation (min): 40 min       PT Therapeutic Procedures Time Entry  Manual Therapy Time Entry: 25       Visit: 3  Insurance: [employer]  Auth: No  20 visit max     Assessment:             Plan:   Continue with manual       Current Problem:  Problem List Items Addressed This Visit             ICD-10-CM    Pelvic floor weakness - Primary N81.89    Pelvic pain in female R10.2             Subjective   Pt reports overall feeling pretty good  Had some discomfort yesterday but worked on stretches and feels improved today       Pain   3-4/10       Objective     EXTERNAL OBSERVATION:  Voluntary Contraction: Present   Voluntary Relaxation: Present  Involuntary Contraction: Present  Involuntary Relaxation Present         INTERNAL VAGINAL EXAMINATION:  PFM Strength: 3/5  Coordination: NT   Endurance: NT    INTERNAL PALPATION:   Pain and tenderness to palpation L levator ani group; radiating pain into L lower abdomen with palpation of L OI       EXTERNAL PALPATION:  Levator Ani: Mild Pain/Tightness R, Mild Pain/Tightness L  Bulbo: No Pain/Tightness R, no Pain/Tightness L  Ischiocavernosus: No Pain/Tightness R, No Pain/Tightness L  Transverse perineum: No Pain/Tightness R, No Pain/Tightness L      TREATMENT  Manual therapy:  Internal manual release of levator ani L and OI L     Reivew-   Therapeutic exercise:  Reverse clamshells x 10   Seated hip IR x 10  Quadruped rock back with hip IR x 10      Careplan Goals:  Problem: PT Problem       Goal: Pt will report 50% reduction in frequency and intensity of L sided pelvic pain          Goal: Pt will demonstrate reduced pelvic floor tension for reduced pain with palpation           Goal: Pt will be independent in manual self-stretching techniques for home maintenance           Goal: Pt will be independent in  HEP for home maintenance            Sushil Herman, PT

## 2024-10-08 ENCOUNTER — APPOINTMENT (OUTPATIENT)
Dept: RADIOLOGY | Facility: CLINIC | Age: 64
End: 2024-10-08
Payer: COMMERCIAL

## 2024-10-08 NOTE — TELEPHONE ENCOUNTER
Pt called regarding medication being sent to the pharmacy, however she wants to go to the lab still, does not want to take the medication without knowing issue. Asking for an order to placed   Is This A New Presentation, Or A Follow-Up?: Skin Lesions How Severe Is Your Skin Lesion?: mild Has Your Skin Lesion Been Treated?: not been treated

## 2024-10-09 ENCOUNTER — HOSPITAL ENCOUNTER (OUTPATIENT)
Dept: RADIOLOGY | Facility: CLINIC | Age: 64
Discharge: HOME | End: 2024-10-09
Payer: COMMERCIAL

## 2024-10-09 DIAGNOSIS — M54.16 LUMBAR RADICULOPATHY: ICD-10-CM

## 2024-10-09 PROCEDURE — 72148 MRI LUMBAR SPINE W/O DYE: CPT | Performed by: RADIOLOGY

## 2024-10-09 PROCEDURE — 72148 MRI LUMBAR SPINE W/O DYE: CPT

## 2024-10-14 ENCOUNTER — APPOINTMENT (OUTPATIENT)
Dept: PHYSICAL THERAPY | Facility: CLINIC | Age: 64
End: 2024-10-14
Payer: COMMERCIAL

## 2024-10-14 ENCOUNTER — TREATMENT (OUTPATIENT)
Dept: PHYSICAL THERAPY | Facility: CLINIC | Age: 64
End: 2024-10-14
Payer: COMMERCIAL

## 2024-10-14 DIAGNOSIS — N81.89 PELVIC FLOOR WEAKNESS: ICD-10-CM

## 2024-10-14 DIAGNOSIS — R10.2 PELVIC PAIN: ICD-10-CM

## 2024-10-14 PROCEDURE — 97140 MANUAL THERAPY 1/> REGIONS: CPT | Mod: GP | Performed by: PHYSICAL THERAPIST

## 2024-10-14 NOTE — PROGRESS NOTES
Physical Therapy    PELVIC FLOOR TREATMENT    Name: Lilli Aldridge  MRN: [id number]  : 1960  Today's Date: 10/14/24     Time Calculation  Start Time: 1000  Stop Time: 1040  Time Calculation (min): 40 min       PT Therapeutic Procedures Time Entry  Manual Therapy Time Entry: 25       Visit: 4  Insurance: [employer]  Auth: No  20 visit max     Assessment:   Difficult with relaxation during manual OI release. Reduced tension with palpation vs last session  Difficult to note if significant carryover           Plan:   Continue with manual       Current Problem:  Problem List Items Addressed This Visit             ICD-10-CM    Pelvic floor weakness N81.89     Other Visit Diagnoses         Codes    Pelvic pain     R10.2              Subjective   Pt states she was able to use the wand over the last week  Does have some soreness afterward, but it dissipates     Scheduled MRI is complete, should be following up with MD within the next week       Pain   4/10       Objective     EXTERNAL OBSERVATION:  Voluntary Contraction: Present   Voluntary Relaxation: Present  Involuntary Contraction: Present  Involuntary Relaxation Present         INTERNAL VAGINAL EXAMINATION:  PFM Strength: 3/5  Coordination: NT   Endurance: NT    INTERNAL PALPATION:   Pain and tenderness to palpation L levator ani group; radiating pain into L lower abdomen with palpation of L OI       EXTERNAL PALPATION:  Levator Ani: Mild Pain/Tightness R, Mild Pain/Tightness L  Bulbo: No Pain/Tightness R, no Pain/Tightness L  Ischiocavernosus: No Pain/Tightness R, No Pain/Tightness L  Transverse perineum: No Pain/Tightness R, No Pain/Tightness L      TREATMENT  Manual therapy:  Internal manual release of levator ani L and OI L         Careplan Goals:  Problem: PT Problem       Goal: Pt will report 50% reduction in frequency and intensity of L sided pelvic pain          Goal: Pt will demonstrate reduced pelvic floor tension for reduced pain with palpation           Goal:  Pt will be independent in manual self-stretching techniques for home maintenance           Goal: Pt will be independent in HEP for home maintenance            Sushil Herman, PT

## 2024-10-16 ENCOUNTER — OFFICE VISIT (OUTPATIENT)
Dept: PAIN MEDICINE | Facility: HOSPITAL | Age: 64
End: 2024-10-16
Payer: COMMERCIAL

## 2024-10-16 DIAGNOSIS — M54.16 LUMBAR RADICULOPATHY: Primary | ICD-10-CM

## 2024-10-16 DIAGNOSIS — M48.061 LUMBAR FORAMINAL STENOSIS: ICD-10-CM

## 2024-10-16 DIAGNOSIS — M71.38 SYNOVIAL CYST OF LUMBAR FACET JOINT: ICD-10-CM

## 2024-10-16 DIAGNOSIS — M48.062 SPINAL STENOSIS OF LUMBAR REGION WITH NEUROGENIC CLAUDICATION: ICD-10-CM

## 2024-10-16 PROCEDURE — 99213 OFFICE O/P EST LOW 20 MIN: CPT | Performed by: PAIN MEDICINE

## 2024-10-16 ASSESSMENT — PAIN DESCRIPTION - DESCRIPTORS: DESCRIPTORS: NUMBNESS;TINGLING;SHOOTING

## 2024-10-16 ASSESSMENT — PAIN - FUNCTIONAL ASSESSMENT: PAIN_FUNCTIONAL_ASSESSMENT: 0-10

## 2024-10-16 ASSESSMENT — PAIN SCALES - GENERAL: PAINLEVEL_OUTOF10: 6

## 2024-10-16 NOTE — PROGRESS NOTES
Subjective   Patient ID: Lilli Aldridge is a 64 y.o. female with a past medical history of  BPPV, HLD, cervical carcinoma in situ 2012 s/p hysterectomy, presenting for follow up of left lower extremity radicular pain.   She states the pain has not changed since her last visit. She has been going to PT which she feels has helped her pelvis some but exacerbated other areas. She rates her pain from 4-9/10 depending on day with numbness down to left foot.  She denies weakness, changes to bowel or bladder.    Recall HPI:   Lilli Aldridge is a 64 y.o. 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 Patient is s/p bilateral sacroiliac joint radiofrequency ablation on April 19, 2024.  Patient had previously undergone a left-sided L3 and L4 transforaminal epidural steroid injection on December 26, 2023.  Patient says that her pain todGuanako Arias is mostly located to the left lower extremity and that it courses down the anterior aspect of her left lower extremity.  Patient describes her pain is about 6-7 out of 10 in intensity and dull in nature.  Patient says she takes Topamax 125 mg daily as well as meloxicam and acetaminophen as needed for additional pain relief. Patient says she also utilizes heating and icing for additional relief.    Physical Therapy: The patient has done six or more weeks of physical therapy in the past six months with minimal improvement  Other Conservative Measures she has tried: Heating Pad, Ice, and Injections  Classes of medications tried in the past: Acetaminophen, NSAIDs, and Antiepileptic agents        Review of Systems   13-point ROS done and negative except for HPI.     Current Outpatient Medications   Medication Instructions    acetaminophen (Tylenol 8 HOUR) 650 mg ER tablet 1 tablet, oral, 3-4 times daily as needed for pain    acetaminophen (TylenoL) 325 mg tablet 2 tablets, oral,  Every 6 hours PRN    albuterol 90 mcg/actuation inhaler 2 puffs, inhalation, Every 6 hours PRN    calcium carbonate-vitamin D3 1,000 mg-20 mcg (800 unit) tablet Calcium 1000 + D 1000-800 MG-UNIT Oral Tablet Take 1 tablet daily Quantity: 0 Refills: 0 Ordered: 2-Sep-2021 Miracle Velasco MD Start : 2-Sep-2021 Active    chlorpheniramine (Chlor-Trimeton) 4 mg tablet 1 tablet, oral, Every 4-6 hours as needed.    fluconazole (Diflucan) 150 mg tablet Take one tablet now, wait three days and then take second tablet for vaginal discomfort.    flunisolide (Nasalide) 25 mcg (0.025 %) spray,non-aerosol 2 sprays, Each Nostril, 2 times daily    loratadine (Claritin) 10 mg tablet 1 tablet, oral, Daily    meclizine (Antivert) 12.5 mg tablet 1 tablet, oral, 3 times daily PRN    meloxicam (MOBIC) 7.5 mg, oral, Daily    omeprazole (PriLOSEC) 20 mg DR capsule 1 capsule, oral, Daily before breakfast    topiramate (TOPAMAX) 100 mg, oral, Daily    topiramate (TOPAMAX) 25 mg, oral, Daily, Patient is now taking 25 mg in the morning and 100 mg at night    vit C-Zn gluc-herbal no.325 (Elderberry Zinc Vit C) 90-15 mg lozenge 1 lozenge, oral, Daily       Past Medical History:   Diagnosis Date    Cervical high risk human papillomavirus (HPV) DNA test positive     Cervical high risk HPV (human papillomavirus) test positive    Cough variant asthma ([facility]) 11/25/2017    Cough variant asthma    Lateral epicondylitis, unspecified elbow     Lateral epicondylitis (tennis elbow)    Personal history of diseases of the blood and blood-forming organs and certain disorders involving the immune mechanism     History of neutropenia    Personal history of other diseases of the musculoskeletal system and connective tissue     History of scoliosis    Urge incontinence 09/16/2022    Urge incontinence    Urinary tract infection, site not specified 09/15/2022    Recurrent UTI        Past Surgical History:   Procedure Laterality Date    BACK SURGERY  07/06/2016     Back Surgery    HYSTERECTOMY  07/06/2016    Hysterectomy    MOUTH SURGERY  11/09/2017    Oral Surgery Tooth Extraction    OTHER SURGICAL HISTORY  11/09/2017    Cervical Conization By Cold Knife    TONSILLECTOMY  11/09/2017    Tonsillectomy    TUBAL LIGATION  07/06/2016    Tubal Ligation        Family History   Problem Relation Name Age of Onset    Other (Diabetes mellitus) Mother      Hypertension Mother      Cataracts Mother      Glaucoma Mother      Other (Diabetes mellitus) Father      Hypertension Father      Liver cancer Father      Cataracts Father      Glaucoma Father      Decreased libido Sister          Allergies   Allergen Reactions    Penicillins Hives and Unknown     Nausea    Cat Dander Unknown    Dog Dander Unknown    Erythromycin Other, Nausea And Vomiting, Nausea Only and Nausea/vomiting        Objective     There were no vitals filed for this visit.     Physical Exam  General: NAD, well groomed, well nourished  Eyes: Non-icteric sclera, EOMI  Ears, Nose, Mouth, and Throat: External ears and nose appear to be without deformity or rash. No lesions or masses noted. Hearing is grossly intact.   Neck: Trachea midline  Respiratory: Nonlabored breathing   Cardiovascular: No significant peripheral edema noted   Skin: No rashes or open lesions/ulcers identified on skin.  Back:  Palpation: tenderness to palpation over left lumbar paraspinous muscles, left gluteal line. PSIS and greater trochanter nontender bilaterally.     Neurologic:   Cranial nerves grossly intact.   Strength 5/5 and symmetric plantar/dorsiflexion   Sensation: Normal to light touch throughout  DTRs: normal and symmetric throughout    Psychiatric: Alert, orientation to person, place, and time. Cooperative.    Imaging personally reviewed and independently interpreted Lumbar MRI shows multilevel degeneration with facet edema and bilateral foraminal stenosis worst at left L4-L5 and L5-S1 with near complete loss of L5-S1 disc  height.    Assessment/Plan   Lilli Aldridge is a 64 y.o. female with a past medical history of  BPPV, HLD, cervical carcinoma in situ 2012 s/p hysterectomy, presenting for follow up of left lower extremity radicular pain. Her history, exam, and imaging are consistent with a left L4-L5 and L5-S1 radiculopathy.    Plan:  - continue PT and home exercises.  - continue topamax and meloxicam.   - Left L4-L5 and L5-S1 TFESI scheduled for next available.  - Referral to spine surgery to establish care in case of acute onset weakness.    Medical Necessity  The patient has failed conservative treatment including different classes of medications and physical therapy.  Patient continues to rate their pain as moderate to severe, affecting quality of sleep, quality of life and  significantly impairing daily activities (ADLs)   We discussed  the risks, benefits and alternatives of the procedure including but not limited to: Lack of efficacy Transiently worsening pain , Bleeding, Infection , and Nerve Damage and patient is amicable to the plan    Follow up: after injection    The patient was invited to contact us back anytime with any questions or concerns and follow-up with us in the office as needed.     There are no diagnoses linked to this encounter.    Patient seen and discussed with Dr. Coleman.      Isidoro Huang DO, MBA, PGY-3  Physical Medicine and Rehabilitation

## 2024-10-23 ENCOUNTER — APPOINTMENT (OUTPATIENT)
Dept: PHYSICAL THERAPY | Facility: CLINIC | Age: 64
End: 2024-10-23
Payer: COMMERCIAL

## 2024-10-30 ENCOUNTER — TREATMENT (OUTPATIENT)
Dept: PHYSICAL THERAPY | Facility: CLINIC | Age: 64
End: 2024-10-30
Payer: COMMERCIAL

## 2024-10-30 DIAGNOSIS — N81.89 PELVIC FLOOR WEAKNESS: ICD-10-CM

## 2024-10-30 DIAGNOSIS — R10.2 PELVIC PAIN: ICD-10-CM

## 2024-10-30 PROCEDURE — 97140 MANUAL THERAPY 1/> REGIONS: CPT | Mod: GP | Performed by: PHYSICAL THERAPIST

## 2024-10-30 PROCEDURE — 97110 THERAPEUTIC EXERCISES: CPT | Mod: GP | Performed by: PHYSICAL THERAPIST

## 2024-11-07 ENCOUNTER — DOCUMENTATION (OUTPATIENT)
Dept: INPATIENT UNIT | Facility: HOSPITAL | Age: 64
End: 2024-11-07

## 2024-11-07 ENCOUNTER — HOSPITAL ENCOUNTER (OUTPATIENT)
Facility: HOSPITAL | Age: 64
Discharge: HOME | End: 2024-11-07
Payer: COMMERCIAL

## 2024-11-07 VITALS
WEIGHT: 132 LBS | HEIGHT: 66 IN | SYSTOLIC BLOOD PRESSURE: 126 MMHG | HEART RATE: 63 BPM | DIASTOLIC BLOOD PRESSURE: 77 MMHG | TEMPERATURE: 96.8 F | RESPIRATION RATE: 16 BRPM | BODY MASS INDEX: 21.21 KG/M2 | OXYGEN SATURATION: 100 %

## 2024-11-07 DIAGNOSIS — M54.16 LUMBAR RADICULOPATHY: ICD-10-CM

## 2024-11-07 PROCEDURE — 64483 NJX AA&/STRD TFRM EPI L/S 1: CPT | Performed by: PAIN MEDICINE

## 2024-11-07 PROCEDURE — 99152 MOD SED SAME PHYS/QHP 5/>YRS: CPT | Performed by: PAIN MEDICINE

## 2024-11-07 PROCEDURE — 3700000012 HC SEDATION LEVEL 5+ TIME - INITIAL 15 MINUTES 5/> YEARS

## 2024-11-07 PROCEDURE — 64484 NJX AA&/STRD TFRM EPI L/S EA: CPT | Performed by: PAIN MEDICINE

## 2024-11-07 PROCEDURE — 2550000001 HC RX 255 CONTRASTS: Performed by: PAIN MEDICINE

## 2024-11-07 PROCEDURE — 2500000004 HC RX 250 GENERAL PHARMACY W/ HCPCS (ALT 636 FOR OP/ED): Performed by: PAIN MEDICINE

## 2024-11-07 RX ORDER — FENTANYL CITRATE 50 UG/ML
INJECTION, SOLUTION INTRAMUSCULAR; INTRAVENOUS
Status: DISPENSED
Start: 2024-11-07 | End: 2024-11-07

## 2024-11-07 RX ORDER — LIDOCAINE HYDROCHLORIDE 5 MG/ML
INJECTION, SOLUTION INFILTRATION; INTRAVENOUS
Status: DISPENSED
Start: 2024-11-07 | End: 2024-11-07

## 2024-11-07 RX ORDER — DEXAMETHASONE SODIUM PHOSPHATE 10 MG/ML
INJECTION INTRAMUSCULAR; INTRAVENOUS
Status: COMPLETED | OUTPATIENT
Start: 2024-11-07 | End: 2024-11-07

## 2024-11-07 RX ORDER — FENTANYL CITRATE 50 UG/ML
INJECTION, SOLUTION INTRAMUSCULAR; INTRAVENOUS
Status: COMPLETED | OUTPATIENT
Start: 2024-11-07 | End: 2024-11-07

## 2024-11-07 RX ORDER — MIDAZOLAM HYDROCHLORIDE 1 MG/ML
INJECTION, SOLUTION INTRAMUSCULAR; INTRAVENOUS
Status: COMPLETED | OUTPATIENT
Start: 2024-11-07 | End: 2024-11-07

## 2024-11-07 RX ORDER — MIDAZOLAM HYDROCHLORIDE 1 MG/ML
INJECTION INTRAMUSCULAR; INTRAVENOUS
Status: DISPENSED
Start: 2024-11-07 | End: 2024-11-07

## 2024-11-07 RX ORDER — LIDOCAINE HYDROCHLORIDE 5 MG/ML
INJECTION, SOLUTION INFILTRATION; INTRAVENOUS
Status: COMPLETED | OUTPATIENT
Start: 2024-11-07 | End: 2024-11-07

## 2024-11-07 RX ORDER — LORAZEPAM 0.5 MG/1
1 TABLET ORAL ONCE
Status: DISCONTINUED | OUTPATIENT
Start: 2024-11-07 | End: 2024-11-08 | Stop reason: HOSPADM

## 2024-11-07 RX ORDER — DEXAMETHASONE SODIUM PHOSPHATE 10 MG/ML
INJECTION INTRAMUSCULAR; INTRAVENOUS
Status: DISPENSED
Start: 2024-11-07 | End: 2024-11-07

## 2024-11-07 ASSESSMENT — PAIN - FUNCTIONAL ASSESSMENT
PAIN_FUNCTIONAL_ASSESSMENT: WONG-BAKER FACES
PAIN_FUNCTIONAL_ASSESSMENT: WONG-BAKER FACES
PAIN_FUNCTIONAL_ASSESSMENT: 0-10

## 2024-11-07 ASSESSMENT — PAIN SCALES - GENERAL
PAINLEVEL_OUTOF10: 7
PAINLEVEL_OUTOF10: 5 - MODERATE PAIN
PAINLEVEL_OUTOF10: 0 - NO PAIN
PAINLEVEL_OUTOF10: 5 - MODERATE PAIN
PAINLEVEL_OUTOF10: 5 - MODERATE PAIN

## 2024-11-07 ASSESSMENT — ENCOUNTER SYMPTOMS
DEPRESSION: 0
OCCASIONAL FEELINGS OF UNSTEADINESS: 0
LOSS OF SENSATION IN FEET: 0

## 2024-11-07 ASSESSMENT — COLUMBIA-SUICIDE SEVERITY RATING SCALE - C-SSRS
2. HAVE YOU ACTUALLY HAD ANY THOUGHTS OF KILLING YOURSELF?: NO
6. HAVE YOU EVER DONE ANYTHING, STARTED TO DO ANYTHING, OR PREPARED TO DO ANYTHING TO END YOUR LIFE?: NO
1. IN THE PAST MONTH, HAVE YOU WISHED YOU WERE DEAD OR WISHED YOU COULD GO TO SLEEP AND NOT WAKE UP?: NO

## 2024-11-07 NOTE — H&P
Pain Management H&P    History Of Present Illness  Lilli Aldridge is a 64 y.o. female presents for procedure state below. Endorses no changes in past medical history or medical health since last seen in clinic.     Mallampati: 2  ASA: 2     Past Medical History  She has a past medical history of Cervical high risk human papillomavirus (HPV) DNA test positive, Cough variant asthma (HHS-HCC) (11/25/2017), Lateral epicondylitis, unspecified elbow, Personal history of diseases of the blood and blood-forming organs and certain disorders involving the immune mechanism, Personal history of other diseases of the musculoskeletal system and connective tissue, Urge incontinence (09/16/2022), and Urinary tract infection, site not specified (09/15/2022).    Surgical History  She has a past surgical history that includes Other surgical history (11/09/2017); Mouth surgery (11/09/2017); Tonsillectomy (11/09/2017); Back surgery (07/06/2016); Hysterectomy (07/06/2016); and Tubal ligation (07/06/2016).     Social History  She reports that she has never smoked. She has never been exposed to tobacco smoke. She has never used smokeless tobacco. She reports that she does not drink alcohol and does not use drugs.    Family History  Family History   Problem Relation Name Age of Onset    Other (Diabetes mellitus) Mother      Hypertension Mother      Cataracts Mother      Glaucoma Mother      Other (Diabetes mellitus) Father      Hypertension Father      Liver cancer Father      Cataracts Father      Glaucoma Father      Decreased libido Sister          Allergies  Penicillins, Cat dander, Dog dander, and Erythromycin    Review of Symptoms:   Constitutional: Negative for chills, diaphoresis or fever  HENT: Negative for neck swelling  Eyes:.  Negative for eye pain  Respiratory:.  Negative for cough, shortness of breath or wheezing    Cardiovascular:.  Negative for chest pain or palpitations  Gastrointestinal:.  Negative for abdominal pain,  nausea and vomiting  Genitourinary:.  Negative for urgency  Musculoskeletal:  Positive for back pain. Positive for joint pain. Denies falls within the past 3 months.  Skin: Negative for wounds or itching   Neurological: Negative for dizziness, seizures, loss of consciousness and weakness  Endo/Heme/Allergies: Does not bruise/bleed easily  Psychiatric/Behavioral: Negative for depression. The patient does not appear anxious.       PHYSICAL EXAM  Vitals signs reviewed  Constitutional:       General: Not in acute distress     Appearance: Normal appearance. Not ill-appearing.  HENT:     Head: Normocephalic and atraumatic  Eyes:     Conjunctiva/sclera: Conjunctivae normal  Cardiovascular:     Rate and Rhythm: Normal rate and regular rhythm  Pulmonary:     Effort: No respiratory distress  Abdominal:     Palpations: Abdomen is soft  Musculoskeletal: DIAMOND  Skin:     General: Skin is warm and dry  Neurological:     General: No focal deficit present  Psychiatric:         Mood and Affect: Mood normal         Behavior: Behavior normal     Last Recorded Vitals  There were no vitals taken for this visit.    Relevant Results  Current Outpatient Medications   Medication Instructions    acetaminophen (Tylenol 8 HOUR) 650 mg ER tablet 1 tablet, oral, 3-4 times daily as needed for pain    acetaminophen (TylenoL) 325 mg tablet 2 tablets, oral, Every 6 hours PRN    albuterol 90 mcg/actuation inhaler 2 puffs, inhalation, Every 6 hours PRN    calcium carbonate-vitamin D3 1,000 mg-20 mcg (800 unit) tablet Calcium 1000 + D 1000-800 MG-UNIT Oral Tablet Take 1 tablet daily Quantity: 0 Refills: 0 Ordered: 2-Sep-2021 Miracle Velasco MD Start : 2-Sep-2021 Active    chlorpheniramine (Chlor-Trimeton) 4 mg tablet 1 tablet, oral, Every 4-6 hours as needed.    fluconazole (Diflucan) 150 mg tablet Take one tablet now, wait three days and then take second tablet for vaginal discomfort.    flunisolide (Nasalide) 25 mcg (0.025 %) spray,non-aerosol 2 sprays,  Each Nostril, 2 times daily    loratadine (Claritin) 10 mg tablet 1 tablet, oral, Daily    meclizine (Antivert) 12.5 mg tablet 1 tablet, oral, 3 times daily PRN    meloxicam (MOBIC) 7.5 mg, oral, Daily    omeprazole (PriLOSEC) 20 mg DR capsule 1 capsule, oral, Daily before breakfast    topiramate (TOPAMAX) 100 mg, oral, Daily    topiramate (TOPAMAX) 25 mg, oral, Daily, Patient is now taking 25 mg in the morning and 100 mg at night    vit C-Zn gluc-herbal no.325 (Elderberry Zinc Vit C) 90-15 mg lozenge 1 lozenge, oral, Daily         MR lumbar spine wo IV contrast 10/09/2024    Narrative  Interpreted By:  Rc Ramos,  STUDY:  MRI of the lumbar spine without IV contrast;  10/9/2024 12:11 pm    INDICATION:  Signs/Symptoms:low back pain.    ,M54.16 Radiculopathy, lumbar region    COMPARISON:  04/21/2022 lumbar MR    ACCESSION NUMBER(S):  [id number]    ORDERING CLINICIAN:  ISAMAR JOHNS    TECHNIQUE:  Sagittal and axial STIR and T1-weighted MRI images of the lumbar  spine were acquired using a spondylolysis protocol.  No contrast was  administered.    FINDINGS:  There are 5 lumbar type vertebrae.    L4-5 is grade 1 degenerative anterolisthesis more so than noted  previously. Mild retrolisthesis at L2-3 is unchanged.    The L5-S1 disc has complete loss of height with degenerative fatty  changes in the adjacent marrow similar to the prior M R. The  remaining discs have degenerative desiccation. The vertebral body  heights are normal.    Tip of the spinal cord ends normally at L1.    T10-11 and T11-12: No stenoses are noted on the sagittal images.    T12-L1: The lateral recesses and spinal canal are mildly stenosed by  disc bulge.    L1-2: The lateral recesses are mildly stenosed by disc bulge.    L2-3: Lateral recesses are mildly to moderately stenosed by disc  protrusion and ligament thickening similar to the prior MR. The  neural foramina are mildly stenosed by facet hypertrophy.    L3-4: The lateral recesses are  mildly stenosed by disc bulge, facet  hypertrophy and ligament thickening. The facet joints are moderately  arthritic. Neural foramina are mildly stenosed by facet hypertrophy.    L4-5: The lateral recesses are moderately to severely stenosed by  disc bulge, ligament thickening and facet hypertrophy along with a  2-3 mm left facet joint cyst similar to mildly worse than the prior  MR The neural foramina are severely stenosed on the left, mildly to  moderately on the right similar to the prior MR. The facet joints are  severely arthritic with small effusions bilaterally.    L5-S1. The right neural foramen has a 3 mm nerve root sleeve cyst,  unchanged. The neural foramina are mildly to moderately stenosed by  endplate spurring and facet hypertrophy bilaterally.    No paraspinous masses or inflammatory processes are noted.    Impression  Multilevel degenerative changes are present similar to mildly worse  the prior MR    I personally reviewed the images/study and I agree with the findings  as stated. This study was interpreted at [place], [place].    MACRO:  None    Signed by: Rc Ramos 10/11/2024 2:23 PM  Dictation workstation:   [id number]      MR LUMBAR SPINE WO IV CONTRAST 04/21/2022    Narrative  MRN: [id number]  Patient Name: SILVANA MARTIN    STUDY:  MRI L-SPINE WO; MRI T-SPINE WO;  4/21/2022 11:27 am    INDICATION:  Ongoing thoracic and lumbar, RLE pain depsite conservative management  including PT, daily home exercises  M54.6: Thoracic back pain.    COMPARISON:  None.    ACCESSION NUMBER(S):  [id number]; [id number]    ORDERING CLINICIAN:  GEORGE PERSAUD    TECHNIQUE:  Sagittal T2, sagittal STIR, sagittal T1, axial T2, axial T1 weighted  MRI images of the lumbar spine were obtained without intravenous  contrast administration.    FINDINGS:  The coronal  images demonstrate a mild levocurvature of the  thoracic and lumbar spine.    There is minimal 1-2 mm of  retrolisthesis of L2 on L3, L1 on L2, and  T12 on L1.    There are degenerative signal changes noted along endplates within  the thoracic and lumbar region.    The visualized spinal cord demonstrates no signal abnormality within  it.  The conus medullaris is normally positioned terminating at the  inferior L1 level.    There is a small perineural cyst/Tarlov cyst noted within the spinal  canal in sacral region the largest of which measures approximate 12  mm in greatest sagittal dimension at the S3 level as seen on sagittal  slice 13 of 25.    At the L5/S1 level,  there is posterior osteophytic spurring along  with degenerative facet changes. There is mild encroachment upon the  ventral spinal canal without significant narrowing of the thecal sac  within the spinal canal. There is moderate to severe right and  moderate left-sided neural foraminal narrowing.    At the L4/L5 level,  there is a posterior disc bulge along with  hypertrophic degenerative facet changes and mild ligamentum flavum  hypertrophy contributing to mild overall narrowing of the thecal sac  within the spinal canal. There is moderate to severe bilateral neural  foraminal narrowing.    At the L3/L4 level,  there is a posterior disc bulge along with  degenerative facet changes and ligamentum flavum hypertrophy  contributing to mild overall narrowing of the thecal sac within the  spinal canal. There is moderate bilateral neural foraminal narrowing.    At the L2/L3 level,  there is 1-2 mm of retrolisthesis of L2 on L3,  posterior disc bulge, along with degenerative facet changes  contribute to mild spinal canal narrowing. There is mild-to-moderate  encroachment upon the neural foramen bilaterally.    At the L1/L2 level,  there is 1-2 mm of retrolisthesis of L1 on L2,  mild posterior osteophytic spurring and posterior disc bulge along  with mild degenerative facet changes contribute to mild encroachment  upon the spinal canal. There is mild encroachment  upon the inferior  recesses of the neural foramen bilaterally.    At the T12/L1 level,  there is a mild posterior disc bulge and  degenerative facet changes contribute to mild encroachment on the  spinal canal. There is mild encroachment upon the inferior recess of  the right neural foramen. There is no significant left-sided neural  foraminal narrowing.    At the T11/12 level, there is a mild posterior disc bulge and  degenerative facet changes contribute to mild encroachment upon the  spinal canal. There is a small perineural cyst/Tarlov cyst extending  through the right neural foramen.    At the T10/11 level, there is a minimal posterior disc osteophyte  complex along with mild degenerative facet changes contribute to mild  encroachment upon the spinal canal without spinal cord deformity.    At the T9/10 level, there are mild degenerative facet changes without  significant spinal canal narrowing.    At the T8/9 level, there are mild degenerative facet changes without  significant spinal canal narrowing.    At the T7/8 level, there is no significant spinal canal narrowing.    At the T6/7 level, there is a minimal posterior disc bulge without  significant spinal canal narrowing.    At the T5/6 level, there are mild degenerative facet changes without  significant spinal canal narrowing.    At the T4/5 level, there are mild degenerative facet changes without  significant spinal canal narrowing.    At the T3/4 level, there is a minimal posterior disc and/or disc  osteophyte complex and mild degenerative facet changes without  significant spinal canal narrowing.    At the T2/3 level, there is no significant spinal canal narrowing.    At the T1/2 level, there is no significant spinal canal narrowing.    At the C7/T1 level, there is a mild posterior disc/osteophyte complex  contribute to mild flattening of ventral subarachnoid space without  significant spinal canal narrowing or spinal cord deformity.    There are a few  small subcentimeter foci of bright signal on the T2  weighted images noted within the left kidney raising the possibility  of small nonspecific cystic foci within the left kidney.    Impression  The coronal  images demonstrate a mild levocurvature of the  thoracic and lumbar spine.    There is minimal 1-2 mm of retrolisthesis of L2 on L3, L1 on L2, and  T12 on L1.    There is multilevel spondylosis within the thoracic and lumbar region  with varying degrees of spinal canal and neural foraminal narrowing  as described above.    There are a few small subcentimeter foci of bright signal on the T2  weighted images noted within the left kidney raising the possibility  of small nonspecific cystic foci within the left kidney.    The study was interpreted at [facility].     No image results found.       No diagnosis found.     ASSESSMENT/PLAN  Lilli Aldridge is a 64 y.o. female presenting for left L4/L5, L5/S1 lumbar transforaminal epidural steroid injection     Patient denies any recent antibiotic use or infections, denies any blood thinner use, and denies contrast or local anesthetic allergies     Risks, benefits, alternatives discussed. All questions answered to the best of my ability. Patient agrees to proceed.      Our plan is as follows:  - Proceed with aforementioned procedure          Sherrill Powell DO   Pain fellow

## 2024-11-07 NOTE — DISCHARGE INSTRUCTIONS
DISCHARGE INSTRUCTIONS FOR INJECTIONS     After most injections, it is recommended that you relax and limit your activity for the remainder of the day unless you have been told otherwise by your pain physician.  You should not drive a car, operate machinery, or make important legal decisions unless otherwise directed by your pain physician.  You may resume your normal activity, including exercise, tomorrow.      Keep a written pain diary of how much pain relief you experienced following the injection procedure and the length of time of pain relief you experienced pain relief. Following diagnostic injections like medial branch nerve blocks, sacroiliac joint blocks, stellate ganglion injections and other blocks, it is very important you record the specific amount of pain relief you experienced immediately after the injectionand how long it lasted. Your doctor will ask you for this information at your follow up visit.     For all injections, please keep the injection site dry and inspect the site for a couple of days. You may remove the Band-Aid the day of the injection at any time.     Some discomfort, bruising or slight swelling may occur at the injection site. This is not abnormal if it occurs.  If needed you may:    -Take over the counter medication such as Tylenol or Motrin.   -Apply an ice pack for 30 minutes, 2 to 3 times a day for the first 24 hours.     You may shower today; no soaking baths, hot tubs, whirlpools or swimming pools for two days.      If you are given steroids in your injection, it may take 3-5 days for the steroid medication to take effect. You may notice a worsening of your symptoms for 1-2 days after the injection. This is not abnormal.  You may use acetaminophen, ibuprofen, or prescription medication that your doctor may have prescribed for you if you need to do so.     A few common side effects of steroids include facial flushing, sweating, restlessness, irritability,difficulty sleeping,  increase in blood sugar, and increased blood pressure. If you have diabetes, please monitor your blood sugar at least once a day for at least 5 days. If you have poorly controlled high blood pressure, monitoryour blood pressure for at least 2 days and contact your primary care physician if these numbers are unusually high for you.      If you take aspirin or non-steroidal anti-inflammatory drugs (examples are Motrin, Advil, ibuprofen, Naprosyn, Voltaren, Relafen, etc.) you may restart these this evening, but stop taking it 3 days before your next appointment, unless instructed otherwiseby your physician.      You do not need to discontinue non-aspirin-containing pain medications prior to an injection (examples: Celebrex, tramadol, hydrocodone and acetaminophen).      If you take a blood thinning medication (Coumadin, Lovenox, Fragmin,Ticlid, Plavix, Pradaxa, etc.), please discuss this with your primary care physician/cardiologist and your pain physician. These medications MUST be discontinued before you can have an injection safely, without the risk of uncontrolled bleeding. If these medications are not discontinued for an appropriate period of time, you will not be able to receivean injection. Please adhere to instructions given to you about when to restart your blood thinning medication. If you have any questions please reach out to our team.    If you are taking Coumadin, please have your INR checked the morning of your procedure and bring the result to your appointment unless otherwise instructed. If your INR is over 1.2, your injection may need to be rescheduled to avoid uncontrolled bleeding from the needle placement.     Call [facility]  and ask for Pain Management at [phone] between 8am-4pm Monday - Friday if you are experiencing the following:    If you received an epidural or spinal injection:    -Headache that doesnot go away with medicine, is worse when sitting or standing up, and is greatly  relieved upon lying down.   -Severe pain worse than or different than your baseline pain.   -Chills or fever (101º F or greater).   -Drainage or signs of infection at the injection site     Go directly to the Emergency Department if you are experiencing the following and received an epidural or spinal injection:   -Abrupt weakness or progressive weakness in your legs that starts after you leave the clinic.   -Abrupt severe or worsening numbness in your legs.   -Inability to urinate after the injection or loss of bowel or bladder control without the urge to defecate or urinate.     If you have a clinical question that cannot wait until your next appointment, please call [phone] between 8am-4pm Monday - Friday or send a [employer] message. We do our best to return all non-emergency messages within 24 hours, Monday - Friday. A nurse or physician will return your message. You may also try calling and they will do their best to answer your question(s):    - Dr. Virginia Ring/Chandra's nurse ([phone])     If you need to cancel an appointment, please call the scheduling staff at [phone] during normal business hours or leave a message at least 24 hours in advance.     If you are going to be sedated for your next procedure, you MUST have responsible adult who can legally drive accompany you home. You cannot eat or drink for at least eight hours prior to the planned procedure if you are going to receive sedation. You may take your non-blood thinning medications with a small sip of water.

## 2024-11-16 DIAGNOSIS — M54.16 LUMBAR RADICULOPATHY: ICD-10-CM

## 2024-11-16 DIAGNOSIS — G89.29 CHRONIC LOW BACK PAIN, UNSPECIFIED BACK PAIN LATERALITY, UNSPECIFIED WHETHER SCIATICA PRESENT: Primary | ICD-10-CM

## 2024-11-16 DIAGNOSIS — M54.50 CHRONIC LOW BACK PAIN, UNSPECIFIED BACK PAIN LATERALITY, UNSPECIFIED WHETHER SCIATICA PRESENT: Primary | ICD-10-CM

## 2024-11-16 RX ORDER — MELOXICAM 7.5 MG/1
TABLET ORAL
Qty: 60 TABLET | Refills: 1 | Status: SHIPPED | OUTPATIENT
Start: 2024-11-16

## 2024-11-19 ENCOUNTER — OFFICE VISIT (OUTPATIENT)
Dept: ORTHOPEDIC SURGERY | Facility: CLINIC | Age: 64
End: 2024-11-19
Payer: COMMERCIAL

## 2024-11-19 ENCOUNTER — APPOINTMENT (OUTPATIENT)
Dept: ORTHOPEDIC SURGERY | Facility: CLINIC | Age: 64
End: 2024-11-19
Payer: COMMERCIAL

## 2024-11-19 VITALS — WEIGHT: 132 LBS | BODY MASS INDEX: 21.21 KG/M2 | HEIGHT: 66 IN

## 2024-11-19 DIAGNOSIS — M43.10 ACQUIRED SPONDYLOLISTHESIS: ICD-10-CM

## 2024-11-19 DIAGNOSIS — M48.061 LUMBAR FORAMINAL STENOSIS: ICD-10-CM

## 2024-11-19 DIAGNOSIS — M48.062 SPINAL STENOSIS OF LUMBAR REGION WITH NEUROGENIC CLAUDICATION: ICD-10-CM

## 2024-11-19 DIAGNOSIS — M54.16 LUMBAR RADICULOPATHY: Primary | ICD-10-CM

## 2024-11-19 PROCEDURE — 3008F BODY MASS INDEX DOCD: CPT | Performed by: ORTHOPAEDIC SURGERY

## 2024-11-19 PROCEDURE — 99203 OFFICE O/P NEW LOW 30 MIN: CPT | Performed by: ORTHOPAEDIC SURGERY

## 2024-11-19 PROCEDURE — 99213 OFFICE O/P EST LOW 20 MIN: CPT | Performed by: ORTHOPAEDIC SURGERY

## 2024-11-19 NOTE — LETTER
November 19, 2024     Linsey Jang MD  [address]  [facility] [address]    Patient: Lilli Aldridge   YOB: 1960   Date of Visit: 11/19/2024       Dear Dr. Linsey Jang MD:    Thank you for referring Lilli Aldridge to me for evaluation. Below are my notes for this consultation.  If you have questions, please do not hesitate to call me. I look forward to following your patient along with you.       Sincerely,     Sidney Beckman MD      CC: Jamey Coleman MD PhD  ______________________________________________________________________________________    HPI:Lilli Aldridge is a 64-year-old woman who comes in today with complaints of left-sided low back pain and buttock pain.  The symptoms will also go down the left leg.  She has had steroid injections and physical therapy.  Overall, her symptoms are tolerable.  She would like to avoid any surgical intervention.       ROS:  Reviewed on EMR and patient intake sheet.    PMH/SH:  Reviewed on EMR and patient intake sheet.    Exam:  Physical Exam    Constitutional: Well appearing; no acute distress  Eyes: pupils are equal and round  Psych: normal affect  Respiratory: non-labored breathing  Cardiovascular: regular rate and rhythm  GI: non-distended abdomen  Musculoskeletal: no pain with range of motion of the hips bilaterally  Neurologic: [4+]/5 strength in the lower extremities bilaterally]; [negative] straight leg raise    Radiology:     MRI demonstrates grade 1 L4-5 spondylolisthesis with a synovial facet cyst in the lateral recess and compression of the traversing L5 nerve root.  Moderate foraminal narrowing at L4-5 as well.  Severe disc degeneration L5-S1.    Diagnosis:    Lumbar radiculopathy; lumbar stenosis    Assessment and Plan:   64-year-old woman with chronic radicular symptoms secondary to lateral recess and foraminal stenosis at L4-5.  Overall her symptoms are tolerable and she would like to avoid surgical  intervention.  We did talk about what surgery would entail should her symptoms become intolerable.  She will follow-up as necessary.    The patient was in agreement with the plan. At the end of the visit today, the patient felt that all questions had been answered satisfactorily.  The patient was pleased with the visit and very appreciative for the care rendered.     Thank you very much for the kind referral.  It is a privilege, and a pleasure, to partner with you in the care of your patients.  I would be delighted to assist you with any further consultations as needed.          Sidney Beckman MD    Chief of Spine Surgery, [facility]  Director of Spine Service, [facility]  , Department of Orthopaedics  [facility] [employer]  [address] [place] [address]  P: [phone]  [facility]    This note was dictated with voice recognition software.  It has not been proofread for grammatical errors, typographical mistakes or other semantic inconsistencies.

## 2024-11-19 NOTE — PROGRESS NOTES
HPI:Lilli Aldridge is a 64-year-old woman who comes in today with complaints of left-sided low back pain and buttock pain.  The symptoms will also go down the left leg.  She has had steroid injections and physical therapy.  Overall, her symptoms are tolerable.  She would like to avoid any surgical intervention.       ROS:  Reviewed on EMR and patient intake sheet.    PMH/SH:  Reviewed on EMR and patient intake sheet.    Exam:  Physical Exam    Constitutional: Well appearing; no acute distress  Eyes: pupils are equal and round  Psych: normal affect  Respiratory: non-labored breathing  Cardiovascular: regular rate and rhythm  GI: non-distended abdomen  Musculoskeletal: no pain with range of motion of the hips bilaterally  Neurologic: [4+]/5 strength in the lower extremities bilaterally]; [negative] straight leg raise    Radiology:     MRI demonstrates grade 1 L4-5 spondylolisthesis with a synovial facet cyst in the lateral recess and compression of the traversing L5 nerve root.  Moderate foraminal narrowing at L4-5 as well.  Severe disc degeneration L5-S1.    Diagnosis:    Lumbar radiculopathy; lumbar stenosis    Assessment and Plan:   64-year-old woman with chronic radicular symptoms secondary to lateral recess and foraminal stenosis at L4-5.  Overall her symptoms are tolerable and she would like to avoid surgical intervention.  We did talk about what surgery would entail should her symptoms become intolerable.  She will follow-up as necessary.    The patient was in agreement with the plan. At the end of the visit today, the patient felt that all questions had been answered satisfactorily.  The patient was pleased with the visit and very appreciative for the care rendered.     Thank you very much for the kind referral.  It is a privilege, and a pleasure, to partner with you in the care of your patients.  I would be delighted to assist you with any further consultations as needed.          Sidney Beckman,  MD    Chief of Spine Surgery, [facility]  Director of Spine Service, [facility]  , Department of Orthopaedics  [facility] [employer]  [address]  [place] [id number]  P: [phone]  [facility]    This note was dictated with voice recognition software.  It has not been proofread for grammatical errors, typographical mistakes or other semantic inconsistencies.

## 2024-11-20 ENCOUNTER — TREATMENT (OUTPATIENT)
Dept: PHYSICAL THERAPY | Facility: CLINIC | Age: 64
End: 2024-11-20
Payer: COMMERCIAL

## 2024-11-20 DIAGNOSIS — N81.89 PELVIC FLOOR WEAKNESS: ICD-10-CM

## 2024-11-20 DIAGNOSIS — R10.2 PELVIC PAIN: ICD-10-CM

## 2024-11-20 PROCEDURE — 97140 MANUAL THERAPY 1/> REGIONS: CPT | Mod: GP | Performed by: PHYSICAL THERAPIST

## 2024-11-20 NOTE — PROGRESS NOTES
Physical Therapy    PELVIC FLOOR TREATMENT    Name: Lilli Aldridge  MRN: [id number]  : 1960  Today's Date: 24     Time Calculation  Start Time: 1005  Stop Time: 1045  Time Calculation (min): 40 min       PT Therapeutic Procedures Time Entry  Manual Therapy Time Entry:        Visit: 6  Insurance: O  Auth: No  20 visit max     Assessment:   Pt tolerated manual well; overall, reduced tension noted with internal manual and reduced tenderness with palpation per pt  Will continue to progress         Plan:   Continue with manual   Quadruped work  Supine core for LB based on symptoms       Current Problem:  Problem List Items Addressed This Visit             ICD-10-CM    Pelvic floor weakness N81.89    Pelvic pain R10.2         Subjective   Pt followed up with ortho surgeon  Overall she has noted improvement in LBP and L LE symptoms  Pelvic pain has also decreased     Injections into lumbar spine performed last week and have been helpful so far       Pain   /10 low back   2/10 pelvic pain     Objective     EXTERNAL OBSERVATION:  Voluntary Contraction: Present   Voluntary Relaxation: Present  Involuntary Contraction: Present  Involuntary Relaxation Present         INTERNAL VAGINAL EXAMINATION:  PFM Strength: 3/5  Coordination: NT   Endurance: NT    INTERNAL PALPATION:   Pain and tenderness to palpation L levator ani group; radiating pain into L lower abdomen with palpation of L OI       EXTERNAL PALPATION:  Levator Ani: Mild Pain/Tightness R, Mild Pain/Tightness L  Bulbo: No Pain/Tightness R, no Pain/Tightness L  Ischiocavernosus: No Pain/Tightness R, No Pain/Tightness L  Transverse perineum: No Pain/Tightness R, No Pain/Tightness L      TREATMENT  Therapeutic exercise:  Review-   PPT x 10 with 3 hold  Lumbar rot x 10  Reverse clamshells 2 x 10 ea     Manual therapy:  Internal manual release of levator ani L and OI L       Careplan Goals:  Problem: PT Problem       Goal: Pt will report 50% reduction in  frequency and intensity of L sided pelvic pain          Goal: Pt will demonstrate reduced pelvic floor tension for reduced pain with palpation           Goal: Pt will be independent in manual self-stretching techniques for home maintenance           Goal: Pt will be independent in HEP for home maintenance            Sushil Herman, PT

## 2024-11-25 NOTE — PATIENT INSTRUCTIONS
-Continue Topamax 100 mg at night but try 25 mg in the morning  -Try Tumeric 1000 mg per day +curcumin daily    -Continue Robaxin and meloxicam as needed   -Consider other medications in the future including Lyrica, Cymbalta, nortriptyline  -Consider left knee injection  in the future if needed  -Continue physical therapy daily home exercises  -Do not wear SIJ brace for more than 2 to 3 hours/day.  -consider cornerloc or transloc procedures with Dr. Artis versus iFuse procedure (with Dr. Matute).    -Follow-up with Dr. Coleman to discuss repeat left L4-5 and L5-S1 injection back to back  -OMT referral provided: Dr. Reji Hauser [phone], or [facility] [phone] or Dr. Scott in [place] [phone]  -Left knee xray ordered  -Follow-up 3 months or sooner if needed

## 2024-11-25 NOTE — PROGRESS NOTES
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 Physical exam is reassuring for lack of neurological compromise. Symptoms and physical exam findings in this complex patient and her of unclear etiology at the moment, but most likely a result of sacroiliac joint dysfunction and reactive myofascial pain/tension, possibly stemming from right knee osteoarthritis. However, lumbar radiculopathy and spondylosis are on the differential, as well as referral from ovarian distention into the posterior right knee, given her history of cervical cancer in the past.    -Continue Topamax 100 mg at night but try 25 mg in the morning  -Try Tumeric 1000 mg per day +curcumin daily    -Continue Robaxin and meloxicam as needed   -Consider other medications in the future including Lyrica, Cymbalta, nortriptyline  -Consider left knee injection  in the future if needed  -Continue physical therapy daily home exercises  -Do not wear SIJ brace for more than 2 to 3 hours/day.  -consider cornerloc or transloc procedures with Dr. Artis versus iFuse procedure (with Dr. Matute).    -Follow-up with Dr. Coleman to discuss repeat left L4-5 and L5-S1 injection back to back  -OMT referral provided: Dr. Reji Hauser [phone], or [facility] [phone] or Dr. Scott in [place] [phone]  -Left knee xray ordered  -Follow-up 3 months or sooner if needed    The patient expressed understanding and agreement with the assessment and plan. Patient encouraged to contact us should they have any questions, concerns, or any changes in symptoms.     Thank you for allowing me to participate in the care of your patient.      ** Dictated with voice recognition software, please forgive any errors in grammar and/or spelling **      Chief  Complaint    Follow up on back, bilateral shoulder and right knee pain      History of Present Illness    This is a pleasant 64-year-old right-handed woman with past medical history significant for BPPV, HLD, cervical carcinoma in situ 2012 s/p hysterectomy, who presents for follow-up chronic back, shoulder, and right knee pain.    She was last seen here on 9/11/2024, at which point I advised her to continue Topamax, Robaxin and meloxicam as needed.    I advised her to continue her exercises and not wear the SI joint brace more than a few hours a day.  Advised her to proceed with physical therapy    I advised her to follow-up with Dr. Artis to discuss an L4-5 injection, previous L3-4 injection did not help.  And her symptoms match the L4-5 distribution more.    I ordered a lumbar MRI and this was done on 10/9/2024, essentially a similar may be slightly worse degenerative changes in several levels especially at L4-5 where there was severe stenosis on the left and moderate on the right, severely arthritic facet joints bilaterally.  Mild to moderate neuroforaminal stenosis at L5/S1.  Images and reports personally reviewed and interpreted today and discussed with the patient.    === 10/09/24 ===    MR LUMBAR SPINE WO CONTRAST    - Impression -  Multilevel degenerative changes are present similar to mildly worse the prior MR    She saw pain management Dr. Coleman on 11/7/2024 for the left L4-5 and L5-S1 ESIs.  Note personally reviewed today. This helped  about 50% especially in the thigh pain.     She saw Dr. Beckman on 11/19/2024 and her symptoms were tolerable and they both agreed to avoid surgery for now.      But lately she is noticing that with increased activity, her left knee is actually bothering her more.  She has not had an x-ray here.  There is significant medial joint line tenderness, no significant pain with deep knee flexion however.    She rates her pain as a 4/10, previously 5/10.    Otherwise, there've  been no changes to her medications or past medical history since the last visit.    ___________________  4/1/2020: Continue home exercises, take meloxicam as needed, Voltaren gel as needed, follow up as needed  12/7/2020: Thoracolumbar trigger point injections, resume exercises, try diclofenac instead of meloxicam and ibuprofen, try Robaxin instead of Skelaxin  1/11/2021: Start gabapentin, continue home exercises, consider MRI, follow-up in 2 months  5/10/2021: Continue exercises, continue diclofenac and Robaxin as needed, consider other medications in the future, consider injections, avoid sitting crosslegged  8/23/2021: Continue exercises, medications as needed, consider MRIs and injections in the future  2/21/2022: Lumbar and thoracic MRI ordered, continue exercises  5/10/2022: Thoracic and lumbar MRI reviewed,, thoracic trigger point injections, referral for lumbar MJ versus facet block/RFA, continue exercises, meloxicam instead of diclofenac, continue Robaxin.  6/14/2022: Core exercises, continue medications as needed, consider repeat injections  8/29/2022: Continue medications as needed, daily home exercises focusing on active strengthening, consider repeat injections with Dr. Artis if needed  2/27/2023: Core exercises provided, continue meloxicam and Robaxin as needed, consider other medications, consider ultrasound-guided SI joint injections  6/5/2023: continue core exercises, continue meloxicam and Robaxin as needed, topamax 50 mg daily consider other medications, consider ultrasound-guided SI joint injections  10/13/2023: Left ultrasound-guided SI joint injection, bilateral thoracic trigger point injections, continue medications and exercises.  11/21/23: Continue medications, exercises, SI joint brace ordered, had a Licart provided, consider other SI joint options, follow-up for another injection in the meantime  12/5/2023: left US guided SIJ injection. Continue medications, exercises, SI joint brace  "ordered, had a Licart provided, consider other SI joint options  2/13/24: Prednisone taper, increase Topamax, restart meloxicam, continue exercises, do not wear brace for more than 2 to 3 hours, proceed with MRI, follow-up for tremor  3/19/24: same day visit, right upper back and neck trigger point injections, continue medications, proceed with repeat MRI, consider neck MRI, continue exercises, neck x-ray ordered   4/9/2024: Left upper back and neck trigger point injections, continue medications,  consider neck MRI, continue exercises, proceed with SI joint procedures with Dr. Artis   6/11/2024: Continue medications and exercises, EMG ordered for the left thigh, consider minimally invasive SI joint fusion procedures  7/24/24: follow up for LFCN with US, same as above   8/2/2024 : Ultrasound-guided lateral femoral cutaneous nerve block, continue medications and HEP, same as above  9/11/2024: Take Topamax 25 mg in the morning 100 mg at night, consider other medications, continue exercises, proceed with PT, lumbar MRI ordered, follow-up with Dr. Artsi for L4 5 injection  11/27/2024: Try turmeric, left knee x-ray ordered, OMT referral provided, continue medications and exercises, do repeat injections with pain management  _______________  As a reminder:    TIMELINE OF COMPLAINT(S):  The patient had back surgery back in 2002, she is not sure what level, may be something involving L4, and she is not sure if there was fusion. This was for \"sciatica or arthritis\", and at the time she had tingling and weakness during down her right foot. Surgery helped, and she had no issues until this past June 2019. There are x-rays in June 2018, but the patient is adamant that it only started in 2019. She said that she was doing well from 2002 until 2019. At some point in the middle, about 6 years ago, she was told that she has scoliosis, but she had no main issues.     When she started feeling the pain come back in June 2019, she had " "some x-rays done and was told that her \"bones were deteriorating\". Then a few months later in August, she fell when she missed a step as she ran down the stairs, and landed on the top of her right hand, and since then it cramps up with certain movements, like when she wipes herself. The back of her knee also started hurting in June 2019, and is worse with prolonged bending and then getting up to stand.     Also in the past month, she felt the tingling \"like warm ants tingling\" going from her lower back to her right shoulder blade. This past weekend she felt pain in both posterior shoulders and neck. The left side is fine now, but she still has a dull ache in the right posterior shoulder and neck area.    The low back is the most bothersome along with the right knee. She is also had tingling in the right foot since June .    Pain:  LOCATION- low back LSJ, L=R but alternates, L groin, bilateral posterior shoulder pain , post neck and right knee and thigh posteriorly  RADIATION- Down R upper arm to elbow. no pain below knee or elbow.  ASSOCIATED WITH- no falls  NUMBNESS/TINGLING- Yes, right foot  WEAKNESS- No  CONSTANT or INTERMITTENT- Intermittent  SEVERITY/QUANTITY- 8, sitting here now 6/10 in shoulder, but none in back  QUALITY- Dull, achy  EXACERBATED BY- Nothing  BETTER WITH- Cold or warm compress  TRIED- Naproxen doesn't help, flexeril doesn't help just sleepy. , Tylenol Arthritis takes edge off, meloxicam hasn't taken in while, medrol dose packs for respiratory issues and also helped with pain. No other meds for this    PHYSICAL THERAPY: Yes, last time was in 2002, does some HEP, no TENS  CHIROPRACTIC MANIPULATION: No  ACUPUNCTURE TREATMENTS: No  DEEP TISSUE MASSAGE THERAPY: Yes, helps temporarily  OSTEOPATHIC MANIPULATION THERAPY: No  INJECTIONS: No  EMG/NCS:   EMG 6/20/2024:  \"The results were essentially within normal limits. There is unobtainable peroneal motor response recording at the EDB muscle, with " "needle exam showing chronic denervation in the left EDB. This is a nonspecific and not necessarily abnormal finding in isolation especially in this age group. No electrical evidence of lumbar radiculopathy was seen. The patient describes clinical symptoms suggestive of left meralgia paresthetica which is not detectable on EMG/nerve conduction studies. Clinical correlation recommended. \"   neck x-ray was done on 3/19/2024 and it showed mild to moderate multilevel cervical spondylosis especially at C5-C7.    IMAGING: Yes, LS xray 8/2018 L5-S1 disc space narrowing, no knee or neck imaging in our system.         [facility] pelvic MRI done on 4/4/2024:  IMPRESSION:  1.  Mild insertional tendinosis of bilateral gluteus medius tendons.  2. Mild bilateral trochanteric bursitis.  3. Mild bilateral hip osteoarthrosis.  4. Marked bilateral facet osteoarthropathy at L4-L5 and L5-S1 with  bilateral facet effusions at L4-L5. At least moderate grade bilateral  L4-5 osseous foraminal stenosis. Consider dedicated lumbar spine MRI  for further evaluation if clinically warranted.  5. Mild osteoarthrosis of the left sacroiliac joint.  6. Small amount of free fluid in the dependent pelvis, a nonspecific finding.      === 10/09/24 ===    MR LUMBAR SPINE WO CONTRAST    - Impression -  Multilevel degenerative changes are present similar to mildly worse the prior MR  especially at L4-5 where there was severe stenosis on the left and moderate on the right, severely arthritic facet joints bilaterally.  Mild to moderate neuroforaminal stenosis at L5/S1      FUNCTIONAL HISTORY: The patient is independent in all ADLs, mobility, and driving. The patient does not use any assistive device.    SOCIAL HISTORY:  Lives in: [facility]  Lives with: Spouse  Occupation: Clergy  Smoking:No  Alcohol: No  Drugs: No    ____________  ROS: The patient denies any bowel or bladder incontinence/accidents, night sweats, fevers, chills, recent significant weight loss. A " 14 point review of systems was reviewed with the patient and is as above and otherwise negative. ROS questionnaire positive for back pain      Physical Exam  GEN - Alert, well-developed, well-nourished, no apparent distress  PSYCH - Cooperative, appropriate mood and affect  HEENT - NC/AT  RESP - Non-labored respirations, equal expansion  CV - well-perfused, No cyanosis or edema in extremities.   ABD- soft, ND  SKIN - No rash.    Positive Yolette and Gaenslen test on the left, significant tenderness over the left SI joint and surrounding paraspinal and gluteal musculature.  Hip range of motion itself was okay and did not reproduce hip pain.    Previous BACK/SPINE - Symmetric posture, no erythema, edema, or swelling. Full lumbar range of motion without provocation of pain symptoms. There was tenderness over both SI joints significantly, as well as bilateral gluteal muscles. No significant tenderness over the greater trochanteric bursa areas. Mild tenderness over the lumbar paraspinal muscles.. Straight leg raise negative bilaterally. Sitting slump test negative bilaterally. Femoral stretch test negative bilaterally.     Previous HIPS/PELVIS - Symmetric in standing and lying Passive hip flexion, internal rotation, and external rotation within functional normal limits bilaterally without provocation of pain symptoms. No tenderness over iliopsoas tendon.positive FABERs on the left, positive Gaenslen's on the right. Negative hip clicking. Negative hip impingement test. Negative log roll. Negative resisted active SLR. No pain with deep hip flexion.patient was not able to do single leg sit to stand on either side    Previous NEURO - UE strength 5/5 - including shoulder abduction, biceps, triceps, wrist extensors, finger flexors, interossei, and    LE strength 5/5 - including hip flexors, knee flexors, knee extensors, ankle dorsiflexors, ankle plantar flexors, and EHL   Sensation - intact to light touch in bilateral lower  and upper extremities.   Reflexes - diminished but equal biceps, brachioradialis, triceps, patellar and Achilles reflexes bilaterally No Clonus, No Babinski, Renee's negative bilaterally  GAIT - Normal base, normal stride length, non-antalgic. Able to toe walk and heel walk without difficulty.

## 2024-11-26 ENCOUNTER — TREATMENT (OUTPATIENT)
Dept: PHYSICAL THERAPY | Facility: CLINIC | Age: 64
End: 2024-11-26
Payer: COMMERCIAL

## 2024-11-26 DIAGNOSIS — R10.2 PELVIC PAIN IN FEMALE: Primary | ICD-10-CM

## 2024-11-26 DIAGNOSIS — N81.89 PELVIC FLOOR WEAKNESS: ICD-10-CM

## 2024-11-26 PROCEDURE — 97110 THERAPEUTIC EXERCISES: CPT | Mod: GP | Performed by: PHYSICAL THERAPIST

## 2024-11-26 NOTE — PROGRESS NOTES
Physical Therapy    PELVIC FLOOR TREATMENT    Name: Lilli Aldridge  MRN: [id number]  : 1960  Today's Date: 24     Time Calculation  Start Time: 900  Stop Time: 930  Time Calculation (min): 30 min       PT Therapeutic Procedures Time Entry  Therapeutic Exercise Time Entry: 25       Visit: 7  [place] [facility] [place]: No  20 visit max     Assessment:   Pt tolerated rx well today. No increase in LBP with performance  Will continue to progress     Plan:   Continue with manual   Supine core for LB based on symptoms       Current Problem:  Problem List Items Addressed This Visit             ICD-10-CM    Pelvic floor weakness N81.89    Pelvic pain in female - Primary R10.2         Subjective   Pt is feeling okay today  More notable pain into the LB vs abdominal pain       Pain   /10 low back   2/10 pelvic pain       Objective     EXTERNAL OBSERVATION:  Voluntary Contraction: Present   Voluntary Relaxation: Present  Involuntary Contraction: Present  Involuntary Relaxation Present         INTERNAL VAGINAL EXAMINATION:  PFM Strength: 3/5  Coordination: NT   Endurance: NT    INTERNAL PALPATION:   Pain and tenderness to palpation L levator ani group; radiating pain into L lower abdomen with palpation of L OI       EXTERNAL PALPATION:  Levator Ani: Mild Pain/Tightness R, Mild Pain/Tightness L  Bulbo: No Pain/Tightness R, no Pain/Tightness L  Ischiocavernosus: No Pain/Tightness R, No Pain/Tightness L  Transverse perineum: No Pain/Tightness R, No Pain/Tightness L      TREATMENT  Therapeutic exercise:  PPT x 10 with 3 hold  Lumbar rot x 10  SKTC x 10 L   Hip add iso x 20   Hip abd (blue) x 20   Reverse clamshells 2 x 10 ea   Quadruped rock back x 10       Careplan Goals:  Problem: PT Problem       Goal: Pt will report 50% reduction in frequency and intensity of L sided pelvic pain          Goal: Pt will demonstrate reduced pelvic floor tension for reduced pain with palpation           Goal: Pt will be independent in  manual self-stretching techniques for home maintenance           Goal: Pt will be independent in HEP for home maintenance            Sushil Herman, PT

## 2024-11-27 ENCOUNTER — APPOINTMENT (OUTPATIENT)
Dept: PHYSICAL MEDICINE AND REHAB | Facility: CLINIC | Age: 64
End: 2024-11-27
Payer: COMMERCIAL

## 2024-11-27 VITALS — WEIGHT: 134 LBS | HEART RATE: 2 BPM | TEMPERATURE: 96.9 F | HEIGHT: 66 IN | BODY MASS INDEX: 21.53 KG/M2

## 2024-11-27 DIAGNOSIS — M46.1 SACROILIITIS (CMS-HCC): ICD-10-CM

## 2024-11-27 DIAGNOSIS — G89.29 CHRONIC LOW BACK PAIN, UNSPECIFIED BACK PAIN LATERALITY, UNSPECIFIED WHETHER SCIATICA PRESENT: ICD-10-CM

## 2024-11-27 DIAGNOSIS — M41.9 SCOLIOSIS, UNSPECIFIED SCOLIOSIS TYPE, UNSPECIFIED SPINAL REGION: ICD-10-CM

## 2024-11-27 DIAGNOSIS — G89.29 CHRONIC PAIN OF RIGHT KNEE: ICD-10-CM

## 2024-11-27 DIAGNOSIS — M79.18 MYOFASCIAL PAIN: Primary | ICD-10-CM

## 2024-11-27 DIAGNOSIS — M53.3 SACROILIAC JOINT DYSFUNCTION OF BOTH SIDES: ICD-10-CM

## 2024-11-27 DIAGNOSIS — M25.561 CHRONIC PAIN OF RIGHT KNEE: ICD-10-CM

## 2024-11-27 DIAGNOSIS — M79.602 CHRONIC PAIN OF LEFT UPPER EXTREMITY: ICD-10-CM

## 2024-11-27 DIAGNOSIS — Z98.890 HISTORY OF LUMBAR SURGERY: ICD-10-CM

## 2024-11-27 DIAGNOSIS — M17.11 PRIMARY OSTEOARTHRITIS OF RIGHT KNEE: ICD-10-CM

## 2024-11-27 DIAGNOSIS — G57.11 MERALGIA PARAESTHETICA, RIGHT: ICD-10-CM

## 2024-11-27 DIAGNOSIS — M54.50 CHRONIC LOW BACK PAIN, UNSPECIFIED BACK PAIN LATERALITY, UNSPECIFIED WHETHER SCIATICA PRESENT: ICD-10-CM

## 2024-11-27 DIAGNOSIS — M54.6 CHRONIC THORACIC BACK PAIN, UNSPECIFIED BACK PAIN LATERALITY: ICD-10-CM

## 2024-11-27 DIAGNOSIS — G89.29 CHRONIC THORACIC BACK PAIN, UNSPECIFIED BACK PAIN LATERALITY: ICD-10-CM

## 2024-11-27 DIAGNOSIS — M54.2 NECK PAIN: ICD-10-CM

## 2024-11-27 DIAGNOSIS — M54.42 LOW BACK PAIN WITH LEFT-SIDED SCIATICA, UNSPECIFIED BACK PAIN LATERALITY, UNSPECIFIED CHRONICITY: ICD-10-CM

## 2024-11-27 DIAGNOSIS — M54.16 LUMBAR RADICULOPATHY: ICD-10-CM

## 2024-11-27 DIAGNOSIS — G89.29 CHRONIC PAIN OF LEFT UPPER EXTREMITY: ICD-10-CM

## 2024-11-27 DIAGNOSIS — M46.1 INFLAMMATION OF SACROILIAC JOINT (CMS-HCC): ICD-10-CM

## 2024-11-27 DIAGNOSIS — M25.562 ACUTE PAIN OF LEFT KNEE: ICD-10-CM

## 2024-11-27 PROCEDURE — 99214 OFFICE O/P EST MOD 30 MIN: CPT | Performed by: PHYSICAL MEDICINE & REHABILITATION

## 2024-11-27 PROCEDURE — 3008F BODY MASS INDEX DOCD: CPT | Performed by: PHYSICAL MEDICINE & REHABILITATION

## 2024-11-27 ASSESSMENT — PAIN SCALES - GENERAL: PAINLEVEL_OUTOF10: 4

## 2024-12-02 ENCOUNTER — HOSPITAL ENCOUNTER (OUTPATIENT)
Dept: RADIOLOGY | Facility: CLINIC | Age: 64
Discharge: HOME | End: 2024-12-02
Payer: COMMERCIAL

## 2024-12-02 DIAGNOSIS — M25.562 ACUTE PAIN OF LEFT KNEE: ICD-10-CM

## 2024-12-02 PROCEDURE — 73562 X-RAY EXAM OF KNEE 3: CPT | Mod: LT

## 2024-12-02 PROCEDURE — 73562 X-RAY EXAM OF KNEE 3: CPT | Mod: LEFT SIDE | Performed by: RADIOLOGY

## 2024-12-03 ENCOUNTER — OFFICE VISIT (OUTPATIENT)
Dept: URGENT CARE | Age: 64
End: 2024-12-03
Payer: COMMERCIAL

## 2024-12-03 VITALS
HEART RATE: 73 BPM | RESPIRATION RATE: 18 BRPM | SYSTOLIC BLOOD PRESSURE: 142 MMHG | OXYGEN SATURATION: 95 % | DIASTOLIC BLOOD PRESSURE: 100 MMHG | TEMPERATURE: 98.6 F

## 2024-12-03 DIAGNOSIS — J06.9 UPPER RESPIRATORY INFECTION, ACUTE: ICD-10-CM

## 2024-12-03 DIAGNOSIS — R06.02 SOB (SHORTNESS OF BREATH): ICD-10-CM

## 2024-12-03 DIAGNOSIS — R05.9 COUGH, UNSPECIFIED TYPE: ICD-10-CM

## 2024-12-03 DIAGNOSIS — J45.30 MILD PERSISTENT ASTHMA WITHOUT COMPLICATION (HHS-HCC): Primary | ICD-10-CM

## 2024-12-03 LAB
POC RAPID INFLUENZA A: NEGATIVE
POC RAPID INFLUENZA B: NEGATIVE

## 2024-12-03 RX ORDER — ALBUTEROL SULFATE 90 UG/1
2 INHALANT RESPIRATORY (INHALATION) EVERY 6 HOURS PRN
Qty: 18 G | Refills: 0 | Status: SHIPPED | OUTPATIENT
Start: 2024-12-03 | End: 2025-12-03

## 2024-12-03 RX ORDER — METHYLPREDNISOLONE 4 MG/1
TABLET ORAL
Qty: 21 TABLET | Refills: 0 | Status: SHIPPED | OUTPATIENT
Start: 2024-12-03 | End: 2024-12-09

## 2024-12-03 RX ORDER — DOXYCYCLINE HYCLATE 100 MG
100 TABLET ORAL 2 TIMES DAILY
Qty: 14 TABLET | Refills: 0 | Status: SHIPPED | OUTPATIENT
Start: 2024-12-03 | End: 2024-12-10

## 2024-12-03 RX ORDER — IPRATROPIUM BROMIDE AND ALBUTEROL SULFATE 2.5; .5 MG/3ML; MG/3ML
3 SOLUTION RESPIRATORY (INHALATION) ONCE
Status: COMPLETED | OUTPATIENT
Start: 2024-12-03 | End: 2024-12-03

## 2024-12-03 ASSESSMENT — ENCOUNTER SYMPTOMS: COUGH: 1

## 2024-12-03 NOTE — PROGRESS NOTES
Subjective   Patient ID: Lilli Aldridge is a 64 y.o. female. They present today with a chief complaint of Cough (Sob and cough for 3 days. At home covid was negative. Hx of asthma).    History of Present Illness  This is a pleasant 64-year-old woman with past medical history significant for BPPV, HLD, cervical carcinoma in situ 2012 s/p hysterectomy, presents with a cough with a PMH of asthma, took an at home covid test and was negative. Pt states her cough has worsened with green sputum and has become more wet. Pt is running out of her albuterol and requests med. Denies CP, fever, chills, HA, dizziness, n/v      Cough        Past Medical History  Allergies as of 12/03/2024 - Reviewed 12/03/2024   Allergen Reaction Noted    Penicillins Hives and Unknown 08/22/2011    Cat dander Unknown 11/01/2023    Dog dander Unknown 11/01/2023    Erythromycin Other, Nausea And Vomiting, Nausea Only, and Nausea/vomiting 08/22/2011    Statins-hmg-coa reductase inhibitors Other 11/07/2024       (Not in a hospital admission)       Past Medical History:   Diagnosis Date    Cervical high risk human papillomavirus (HPV) DNA test positive     Cervical high risk HPV (human papillomavirus) test positive    Cough variant asthma (HHS-HCC) 11/25/2017    Cough variant asthma    Lateral epicondylitis, unspecified elbow     Lateral epicondylitis (tennis elbow)    Personal history of diseases of the blood and blood-forming organs and certain disorders involving the immune mechanism     History of neutropenia    Personal history of other diseases of the musculoskeletal system and connective tissue     History of scoliosis    Urge incontinence 09/16/2022    Urge incontinence    Urinary tract infection, site not specified 09/15/2022    Recurrent UTI       Past Surgical History:   Procedure Laterality Date    BACK SURGERY  07/06/2016    Back Surgery    HYSTERECTOMY  07/06/2016    Hysterectomy    MOUTH SURGERY  11/09/2017    Oral Surgery Tooth  Extraction    OTHER SURGICAL HISTORY  11/09/2017    Cervical Conization By Cold Knife    TONSILLECTOMY  11/09/2017    Tonsillectomy    TUBAL LIGATION  07/06/2016    Tubal Ligation        reports that she has never smoked. She has never been exposed to tobacco smoke. She has never used smokeless tobacco. She reports that she does not drink alcohol and does not use drugs.    Review of Systems  Review of Systems   Respiratory:  Positive for cough.                                   Objective    Vitals:    12/03/24 0918   BP: (!) 142/100   Pulse: 73   Resp: 18   Temp: 37 °C (98.6 °F)   SpO2: 95%     No LMP recorded. Patient has had a hysterectomy.    Physical Exam  Constitutional:       Appearance: Normal appearance.   HENT:      Head: Normocephalic.      Right Ear: Tympanic membrane, ear canal and external ear normal.      Left Ear: Tympanic membrane, ear canal and external ear normal.      Nose: Congestion and rhinorrhea present. Rhinorrhea is clear.      Mouth/Throat:      Mouth: Mucous membranes are dry.      Pharynx: Oropharynx is clear. Postnasal drip present.      Tonsils: No tonsillar exudate or tonsillar abscesses.   Eyes:      Extraocular Movements: Extraocular movements intact.      Pupils: Pupils are equal, round, and reactive to light.   Cardiovascular:      Rate and Rhythm: Normal rate and regular rhythm.      Pulses: Normal pulses.      Heart sounds: Normal heart sounds.   Pulmonary:      Effort: Pulmonary effort is normal.      Breath sounds: Examination of the right-upper field reveals decreased breath sounds. Examination of the left-upper field reveals decreased breath sounds. Examination of the right-middle field reveals decreased breath sounds. Examination of the left-middle field reveals decreased breath sounds. Examination of the right-lower field reveals decreased breath sounds. Examination of the left-lower field reveals decreased breath sounds. Decreased breath sounds present.      Comments:  Abnormal PE, lungs were diminished throughout with audible wet cough  Musculoskeletal:         General: Normal range of motion.      Cervical back: Normal range of motion and neck supple.   Lymphadenopathy:      Cervical: Cervical adenopathy present.   Skin:     General: Skin is warm and dry.   Neurological:      General: No focal deficit present.      Mental Status: She is alert and oriented to person, place, and time.   Psychiatric:         Mood and Affect: Mood normal.         Behavior: Behavior normal.         Procedures    Point of Care Test & Imaging Results from this visit  Results for orders placed or performed in visit on 12/03/24   POCT Influenza A/B manually resulted   Result Value Ref Range    POC Rapid Influenza A Negative Negative    POC Rapid Influenza B Negative Negative      No results found.    Diagnostic study results (if any) were reviewed by BILLIE Gutierrez.    Assessment/Plan   Allergies, medications, history, and pertinent labs/EKGs/Imaging reviewed by BILLIE Gutierrez.     Medical Decision Making  This is a pleasant 64-year-old woman with past medical history significant for BPPV, HLD, cervical carcinoma in situ 2012 s/p hysterectomy, presents with a cough with a PMH of asthma, took an at home covid test and was negative. Pt states her cough has worsened with green sputum and has become more wet. Pt is running out of her albuterol and requests med. Denies CP, fever, chills, HA, dizziness, n/v  Rapid flu- NEG    Abnormal PE, lungs were diminished throughout with audible wet cough  Duo nebulizer implemented in , lungs sounds improved  suspect ongoing sinusitis with mild asthma exacerbation and onset of URI, with lung sounds decreased throughout but equal A/P so will treat with doxycycline, and advised to implement normal saline mist spray, flonase daily, and if s/s persist after 24-48 hours to take a covid test.  There is no reported CP, BULLOCK, pleuritic pain, fever.  Clinical suspicions of pneumonia or other cardiorespiratory catastrophe at this time are low, but pt requests chest xray even with empirical coverage d/t family demanding to obtain, so chest xray ordered per request of pt and family. Risks were communicated, especially with PMH of CA. Pt is ambulating without difficulty showing no signs of hypoxia. Given the pt underlying risk factors, and exam will err on the side of caution and cover for underlying ARBS/CAP organisms . Pt well-hydrated, nontoxic and there no signs of clinical deterioration. Patient well hydrated, neurovascularly intact.     Follow up Care: Pt instructed to follow-up with PCP or other appropriate clinician within 24 to 48 hours. Report to ED if there is any development in worsening pain, difficulty swallowing, change in phonation, fever, chills, neck pain, photophobia, headache, neck stiffness, chest pain, abdominal pain, vomiting, syncope, hemoptysis, leg swelling SOB, fever, facial swelling, eye pain, periorbital swelling/erythema, or any new signs or sx.     Advised to take daily anti-histamines   alternate Tylenol and Motrin PRN for discomfort  gargle with warm water and salt  f/u PCP 2-3 days  normal saline mist spray three times a day and Flonase daily  Strict FU precautions, especially if flying or traveling outside the country  Rest, fluids.  take prescribed medications as directed.  advised BRAT diet, fluids and soft foods  complete atb as directed  take with foods and add probiotics daily  Breathing warm, moist air helps loosen the sticky mucus that may make you feel like you are choking. Other things that may also help include:  Placing a warm, wet washcloth loosely near your nose and mouth.  Filling a humidifier with warm water and breathing in the warm mist.  Coughing helps clear your airways. Take a couple of deep breaths, 2 to 3 times every hour. Deep breaths help open up your lungs.  While lying down, tap your chest gently a few  times a day. This helps bring up mucus from the lungs.  If you smoke, now is the time to quit. Do not allow smoking in your home.  Drink plenty of liquids, as long as your provider says it is OK. Avoid dairy products as can increase mucous production  Drink water, juice, or weak tea.  Drink at least 6 to 10 cups (1.5 to 2.5 liters) a day.  Do not drink alcohol.  Get plenty of rest when you go home. If you have trouble sleeping at night, take naps during the day.    The patient and/or family was educated regarding diagnosis, supportive care, OTC and Rx medications. The patient and/or family was given the opportunity to ask questions prior to discharge. They verbalized understanding of my discussion of the plans for treatment, expected course, indications to return to  or seek further evaluation in ED, and the need for timely follow up as directed.     === 12/03/24 ===    XR CHEST 2 VIEWS    - Impression -  Hyperinflated lungs. No evidence of acute cardiopulmonary process.      MACRO:  None    Signed by: Popeye Virk 12/3/2024 11:00 AM  Dictation workstation:   [id number]        Go to an emergency department or call 911 or the local emergency number for help for problems such as:    Trouble breathing  Passing out, fainting  Pain in the arm or jaw  Unusual or bad headache, particularly if it started suddenly  Suddenly not able to speak, see, walk, or move  Suddenly weak or drooping on one side of the body  Dizziness or weakness that does not go away  Sudden confusion  Coughing or throwing up blood  Severe pain anywhere on the body  High fever with headache and stiff neck  High fever that does not get better with medicine  Throwing up or loose stools that does not stop  Seizures  Vision changes.  Feeling disoriented or confused.  Swelling of the eyes or forehead.  Severe headache.  Seizure.  Inability to move neck forward.    Orders and Diagnoses  Diagnoses and all orders for this visit:  Mild persistent asthma  without complication ([facility])  -     methylPREDNISolone (Medrol Dospak) 4 mg tablets; Follow schedule on package instructions  -     doxycycline (Vibra-Tabs) 100 mg tablet; Take 1 tablet (100 mg) by mouth 2 times a day for 7 days. Take with a full glass of water and do not lie down for at least 30 minutes after.  -     albuterol 90 mcg/actuation inhaler; Inhale 2 puffs every 6 hours if needed for wheezing.  -     XR chest 2 views; Future  Cough, unspecified type  -     POCT Influenza A/B manually resulted  -     ipratropium-albuteroL (Duo-Neb) 0.5-2.5 mg/3 mL nebulizer solution 3 mL  -     methylPREDNISolone (Medrol Dospak) 4 mg tablets; Follow schedule on package instructions  -     albuterol 90 mcg/actuation inhaler; Inhale 2 puffs every 6 hours if needed for wheezing.  -     XR chest 2 views; Future  SOB (shortness of breath)  -     POCT Influenza A/B manually resulted  -     methylPREDNISolone (Medrol Dospak) 4 mg tablets; Follow schedule on package instructions  -     albuterol 90 mcg/actuation inhaler; Inhale 2 puffs every 6 hours if needed for wheezing.  -     XR chest 2 views; Future  Upper respiratory infection, acute  -     methylPREDNISolone (Medrol Dospak) 4 mg tablets; Follow schedule on package instructions  -     doxycycline (Vibra-Tabs) 100 mg tablet; Take 1 tablet (100 mg) by mouth 2 times a day for 7 days. Take with a full glass of water and do not lie down for at least 30 minutes after.  -     albuterol 90 mcg/actuation inhaler; Inhale 2 puffs every 6 hours if needed for wheezing.  -     XR chest 2 views; Future      Medical Admin Record      Patient disposition: Home    Electronically signed by BILLIE Gutierrez  10:22 AM

## 2024-12-04 ENCOUNTER — APPOINTMENT (OUTPATIENT)
Dept: PHYSICAL THERAPY | Facility: CLINIC | Age: 64
End: 2024-12-04
Payer: COMMERCIAL

## 2024-12-04 ENCOUNTER — TELEPHONE (OUTPATIENT)
Dept: PHYSICAL MEDICINE AND REHAB | Facility: CLINIC | Age: 64
End: 2024-12-04
Payer: COMMERCIAL

## 2024-12-04 NOTE — TELEPHONE ENCOUNTER
----- Message from María Elena Keith sent at 12/3/2024  2:24 PM EST -----  Please let her know that her knee x-ray was okay, it did show some signs of pseudogout, which can cause some pain, and an injection in the future can potentially help.  She should continue with our plan otherwise And I will see her in follow-up.  ----- Message -----  From: Interface, Radiology Results In  Sent: 12/3/2024   1:25 PM EST  To: María Elena Keith MD

## 2024-12-17 ENCOUNTER — TREATMENT (OUTPATIENT)
Dept: PHYSICAL THERAPY | Facility: CLINIC | Age: 64
End: 2024-12-17
Payer: COMMERCIAL

## 2024-12-17 DIAGNOSIS — R10.2 PELVIC PAIN IN FEMALE: ICD-10-CM

## 2024-12-17 DIAGNOSIS — N81.89 PELVIC FLOOR WEAKNESS: Primary | ICD-10-CM

## 2024-12-17 PROCEDURE — 97140 MANUAL THERAPY 1/> REGIONS: CPT | Mod: GP | Performed by: PHYSICAL THERAPIST

## 2024-12-17 NOTE — PROGRESS NOTES
Physical Therapy    PELVIC FLOOR TREATMENT    Name: Lilli Aldridge  MRN: [id number]  : 1960  Today's Date: 24     Time Calculation  Start Time: 905  Stop Time: 940  Time Calculation (min): 35 min       PT Therapeutic Procedures Time Entry  Manual Therapy Time Entry:        Visit: 8  [place] [facility] [place] No  20 visit max     Assessment:   Tolerated manual therapy well today  Increased tension L OI with palpation today.     Plan:   Continue with manual   Will plan to discharge at ; coming up in   May have to reschedule session on       Current Problem:  Problem List Items Addressed This Visit             ICD-10-CM    Pelvic floor weakness - Primary N81.89    Pelvic pain in female R10.2           Subjective   Pt reports limited pain overall; pelvic pain hasn't been too bad  Pain continues to be intermittent in nature   Has been incorporating tumeric into diet and feels this has been helpful     Pain   4/10 pelvic       Objective     EXTERNAL OBSERVATION:  Voluntary Contraction: Present   Voluntary Relaxation: Present  Involuntary Contraction: Present  Involuntary Relaxation Present         INTERNAL VAGINAL EXAMINATION:  PFM Strength: 3/5  Coordination: NT   Endurance: NT    INTERNAL PALPATION:   Pain and tenderness to palpation L levator ani group; radiating pain into L lower abdomen with palpation of L OI       EXTERNAL PALPATION:  Levator Ani: Mild Pain/Tightness R, Mild Pain/Tightness L  Bulbo: No Pain/Tightness R, no Pain/Tightness L  Ischiocavernosus: No Pain/Tightness R, No Pain/Tightness L  Transverse perineum: No Pain/Tightness R, No Pain/Tightness L      TREATMENT  Manual therapy:  Internal OI release with informed pt consent     Not today-   Therapeutic exercise:  PPT x 10 with 3 hold  Lumbar rot x 10  SKTC x 10 L   Hip add iso x 20   Hip abd (blue) x 20   Reverse clamshells 2 x 10 ea   Quadruped rock back x 10       Careplan Goals:  Problem: PT Problem       Goal: Pt will report  50% reduction in frequency and intensity of L sided pelvic pain          Goal: Pt will demonstrate reduced pelvic floor tension for reduced pain with palpation           Goal: Pt will be independent in manual self-stretching techniques for home maintenance           Goal: Pt will be independent in HEP for home maintenance            Sushil Herman, PT

## 2024-12-18 DIAGNOSIS — G89.29 CHRONIC LOW BACK PAIN, UNSPECIFIED BACK PAIN LATERALITY, UNSPECIFIED WHETHER SCIATICA PRESENT: ICD-10-CM

## 2024-12-18 DIAGNOSIS — M54.16 LUMBAR RADICULOPATHY: ICD-10-CM

## 2024-12-18 DIAGNOSIS — M54.50 CHRONIC LOW BACK PAIN, UNSPECIFIED BACK PAIN LATERALITY, UNSPECIFIED WHETHER SCIATICA PRESENT: ICD-10-CM

## 2024-12-18 RX ORDER — MELOXICAM 7.5 MG/1
7.5-15 TABLET ORAL DAILY
Qty: 60 TABLET | Refills: 2 | Status: SHIPPED | OUTPATIENT
Start: 2024-12-18 | End: 2025-03-18

## 2024-12-23 ENCOUNTER — TREATMENT (OUTPATIENT)
Dept: PHYSICAL THERAPY | Facility: CLINIC | Age: 64
End: 2024-12-23
Payer: COMMERCIAL

## 2024-12-23 DIAGNOSIS — R10.2 PELVIC PAIN IN FEMALE: Primary | ICD-10-CM

## 2024-12-23 DIAGNOSIS — N81.89 PELVIC FLOOR WEAKNESS: ICD-10-CM

## 2024-12-23 PROCEDURE — 97140 MANUAL THERAPY 1/> REGIONS: CPT | Mod: GP | Performed by: PHYSICAL THERAPIST

## 2024-12-23 NOTE — PROGRESS NOTES
"Physical Therapy    PELVIC FLOOR TREATMENT    Name: Lilli Aldridge  MRN: [id number]  : 1960  Today's Date: 24     Time Calculation  Start Time: 09  Stop Time: 940  Time Calculation (min): 40 min       PT Therapeutic Procedures Time Entry  Manual Therapy Time Entry:        Visit: 9  [place] [facility] [place]  Auth: No  20 visit max     Assessment:   Pt tolerated manual therapy well. Mild tension noted with palpation of OI; tenderness per pt    Plan:   Continue with manual   Will plan to discharge at ; coming up in     Current Problem:  Problem List Items Addressed This Visit             ICD-10-CM    Pelvic floor weakness N81.89    Pelvic pain in female - Primary R10.2       Subjective     \"I am feeling the pelvic pain a little more\"     Pain   4/10 pelvic       Objective     EXTERNAL OBSERVATION:  Voluntary Contraction: Present   Voluntary Relaxation: Present  Involuntary Contraction: Present  Involuntary Relaxation Present         INTERNAL VAGINAL EXAMINATION:  PFM Strength: 3/5  Coordination: NT   Endurance: NT    INTERNAL PALPATION:   Pain and tenderness to palpation L levator ani group; radiating pain into L lower abdomen with palpation of L OI       EXTERNAL PALPATION:  Levator Ani: Mild Pain/Tightness R, Mild Pain/Tightness L  Bulbo: No Pain/Tightness R, no Pain/Tightness L  Ischiocavernosus: No Pain/Tightness R, No Pain/Tightness L  Transverse perineum: No Pain/Tightness R, No Pain/Tightness L      TREATMENT  Manual therapy:  Internal OI release with informed pt consent     Not today-   Therapeutic exercise:  PPT x 10 with 3 hold  Lumbar rot x 10  SKTC x 10 L   Hip add iso x 20   Hip abd (blue) x 20   Reverse clamshells 2 x 10 ea   Quadruped rock back x 10       Careplan Goals:  Problem: PT Problem       Goal: Pt will report 50% reduction in frequency and intensity of L sided pelvic pain          Goal: Pt will demonstrate reduced pelvic floor tension for reduced pain with palpation           Goal: " Pt will be independent in manual self-stretching techniques for home maintenance           Goal: Pt will be independent in HEP for home maintenance            Sushil Herman, PT

## 2024-12-31 DIAGNOSIS — M54.42 LOW BACK PAIN WITH LEFT-SIDED SCIATICA, UNSPECIFIED BACK PAIN LATERALITY, UNSPECIFIED CHRONICITY: ICD-10-CM

## 2024-12-31 DIAGNOSIS — M54.16 LUMBAR RADICULOPATHY: ICD-10-CM

## 2024-12-31 RX ORDER — TOPIRAMATE 25 MG/1
25 TABLET ORAL DAILY
Qty: 90 TABLET | Refills: 1 | Status: SHIPPED | OUTPATIENT
Start: 2024-12-31 | End: 2025-06-29

## 2024-12-31 RX ORDER — TOPIRAMATE 100 MG/1
100 TABLET, FILM COATED ORAL NIGHTLY
Qty: 90 TABLET | Refills: 1 | Status: SHIPPED | OUTPATIENT
Start: 2024-12-31 | End: 2025-06-29

## 2025-01-06 ENCOUNTER — APPOINTMENT (OUTPATIENT)
Dept: RADIOLOGY | Facility: HOSPITAL | Age: 65
End: 2025-01-06
Payer: COMMERCIAL

## 2025-01-06 ENCOUNTER — HOSPITAL ENCOUNTER (EMERGENCY)
Facility: HOSPITAL | Age: 65
Discharge: HOME | End: 2025-01-07
Payer: COMMERCIAL

## 2025-01-06 ENCOUNTER — APPOINTMENT (OUTPATIENT)
Dept: CARDIOLOGY | Facility: HOSPITAL | Age: 65
End: 2025-01-06
Payer: COMMERCIAL

## 2025-01-06 VITALS
SYSTOLIC BLOOD PRESSURE: 125 MMHG | RESPIRATION RATE: 20 BRPM | TEMPERATURE: 98 F | DIASTOLIC BLOOD PRESSURE: 72 MMHG | BODY MASS INDEX: 21.53 KG/M2 | HEIGHT: 66 IN | WEIGHT: 134 LBS | OXYGEN SATURATION: 100 % | HEART RATE: 77 BPM

## 2025-01-06 LAB
ALBUMIN SERPL BCP-MCNC: 4.2 G/DL (ref 3.4–5)
ALP SERPL-CCNC: 64 U/L (ref 33–136)
ALT SERPL W P-5'-P-CCNC: 17 U/L (ref 7–45)
ANION GAP SERPL CALC-SCNC: 8 MMOL/L (ref 10–20)
APPEARANCE UR: CLEAR
AST SERPL W P-5'-P-CCNC: 19 U/L (ref 9–39)
ATRIAL RATE: 68 BPM
BASOPHILS # BLD MANUAL: 0.42 X10*3/UL (ref 0–0.1)
BASOPHILS NFR BLD MANUAL: 6 %
BILIRUB SERPL-MCNC: 0.5 MG/DL (ref 0–1.2)
BILIRUB UR STRIP.AUTO-MCNC: NEGATIVE MG/DL
BUN SERPL-MCNC: 22 MG/DL (ref 6–23)
BURR CELLS BLD QL SMEAR: ABNORMAL
CALCIUM SERPL-MCNC: 9.5 MG/DL (ref 8.6–10.3)
CHLORIDE SERPL-SCNC: 110 MMOL/L (ref 98–107)
CO2 SERPL-SCNC: 27 MMOL/L (ref 21–32)
COLOR UR: NORMAL
CREAT SERPL-MCNC: 1.02 MG/DL (ref 0.5–1.05)
DACRYOCYTES BLD QL SMEAR: ABNORMAL
EGFRCR SERPLBLD CKD-EPI 2021: 62 ML/MIN/1.73M*2
EOSINOPHIL # BLD MANUAL: 0.21 X10*3/UL (ref 0–0.7)
EOSINOPHIL NFR BLD MANUAL: 3 %
ERYTHROCYTE [DISTWIDTH] IN BLOOD BY AUTOMATED COUNT: 14.4 % (ref 11.5–14.5)
GLUCOSE SERPL-MCNC: 88 MG/DL (ref 74–99)
GLUCOSE UR STRIP.AUTO-MCNC: NORMAL MG/DL
HCT VFR BLD AUTO: 44.2 % (ref 36–46)
HGB BLD-MCNC: 14 G/DL (ref 12–16)
IMM GRANULOCYTES # BLD AUTO: 0.51 X10*3/UL (ref 0–0.7)
IMM GRANULOCYTES NFR BLD AUTO: 7.3 % (ref 0–0.9)
KETONES UR STRIP.AUTO-MCNC: NEGATIVE MG/DL
LEUKOCYTE ESTERASE UR QL STRIP.AUTO: NEGATIVE
LIPASE SERPL-CCNC: 36 U/L (ref 9–82)
LYMPHOCYTES # BLD MANUAL: 1.19 X10*3/UL (ref 1.2–4.8)
LYMPHOCYTES NFR BLD MANUAL: 17 %
MCH RBC QN AUTO: 27.9 PG (ref 26–34)
MCHC RBC AUTO-ENTMCNC: 31.7 G/DL (ref 32–36)
MCV RBC AUTO: 88 FL (ref 80–100)
MONOCYTES # BLD MANUAL: 0.49 X10*3/UL (ref 0.1–1)
MONOCYTES NFR BLD MANUAL: 7 %
MYELOCYTES # BLD MANUAL: 0.35 X10*3/UL
MYELOCYTES NFR BLD MANUAL: 5 %
NEUTROPHILS # BLD MANUAL: 4.34 X10*3/UL (ref 1.2–7.7)
NEUTS BAND # BLD MANUAL: 0.14 X10*3/UL (ref 0–0.7)
NEUTS BAND NFR BLD MANUAL: 2 %
NEUTS SEG # BLD MANUAL: 4.2 X10*3/UL (ref 1.2–7)
NEUTS SEG NFR BLD MANUAL: 60 %
NITRITE UR QL STRIP.AUTO: NEGATIVE
NRBC BLD-RTO: 0 /100 WBCS (ref 0–0)
OVALOCYTES BLD QL SMEAR: ABNORMAL
P AXIS: 81 DEGREES
P OFFSET: 166 MS
P ONSET: 124 MS
PH UR STRIP.AUTO: 5 [PH]
PLATELET # BLD AUTO: 377 X10*3/UL (ref 150–450)
POTASSIUM SERPL-SCNC: 3.9 MMOL/L (ref 3.5–5.3)
PR INTERVAL: 206 MS
PROT SERPL-MCNC: 7.1 G/DL (ref 6.4–8.2)
PROT UR STRIP.AUTO-MCNC: NEGATIVE MG/DL
Q ONSET: 227 MS
QRS COUNT: 12 BEATS
QRS DURATION: 74 MS
QT INTERVAL: 414 MS
QTC CALCULATION(BAZETT): 440 MS
QTC FREDERICIA: 431 MS
R AXIS: 85 DEGREES
RBC # BLD AUTO: 5.01 X10*6/UL (ref 4–5.2)
RBC # UR STRIP.AUTO: NEGATIVE /UL
RBC MORPH BLD: ABNORMAL
SODIUM SERPL-SCNC: 141 MMOL/L (ref 136–145)
SP GR UR STRIP.AUTO: 1.02
T AXIS: 52 DEGREES
T OFFSET: 434 MS
TOTAL CELLS COUNTED BLD: 100
UROBILINOGEN UR STRIP.AUTO-MCNC: NORMAL MG/DL
VENTRICULAR RATE: 68 BPM
WBC # BLD AUTO: 7 X10*3/UL (ref 4.4–11.3)

## 2025-01-06 PROCEDURE — 74177 CT ABD & PELVIS W/CONTRAST: CPT | Performed by: SURGERY

## 2025-01-06 PROCEDURE — 74177 CT ABD & PELVIS W/CONTRAST: CPT

## 2025-01-06 PROCEDURE — 36415 COLL VENOUS BLD VENIPUNCTURE: CPT | Performed by: PHYSICIAN ASSISTANT

## 2025-01-06 PROCEDURE — 76705 ECHO EXAM OF ABDOMEN: CPT | Performed by: RADIOLOGY

## 2025-01-06 PROCEDURE — 2550000001 HC RX 255 CONTRASTS: Performed by: EMERGENCY MEDICINE

## 2025-01-06 PROCEDURE — 85027 COMPLETE CBC AUTOMATED: CPT | Performed by: PHYSICIAN ASSISTANT

## 2025-01-06 PROCEDURE — 76705 ECHO EXAM OF ABDOMEN: CPT

## 2025-01-06 PROCEDURE — 81003 URINALYSIS AUTO W/O SCOPE: CPT | Performed by: PHYSICIAN ASSISTANT

## 2025-01-06 PROCEDURE — 99285 EMERGENCY DEPT VISIT HI MDM: CPT | Mod: 25

## 2025-01-06 PROCEDURE — 85007 BL SMEAR W/DIFF WBC COUNT: CPT | Performed by: PHYSICIAN ASSISTANT

## 2025-01-06 PROCEDURE — 83690 ASSAY OF LIPASE: CPT | Performed by: PHYSICIAN ASSISTANT

## 2025-01-06 PROCEDURE — 93005 ELECTROCARDIOGRAM TRACING: CPT

## 2025-01-06 PROCEDURE — 80053 COMPREHEN METABOLIC PANEL: CPT | Performed by: PHYSICIAN ASSISTANT

## 2025-01-06 RX ADMIN — IOHEXOL 75 ML: 350 INJECTION, SOLUTION INTRAVENOUS at 20:30

## 2025-01-06 ASSESSMENT — PAIN DESCRIPTION - LOCATION: LOCATION: ABDOMEN

## 2025-01-06 ASSESSMENT — COLUMBIA-SUICIDE SEVERITY RATING SCALE - C-SSRS
2. HAVE YOU ACTUALLY HAD ANY THOUGHTS OF KILLING YOURSELF?: NO
1. IN THE PAST MONTH, HAVE YOU WISHED YOU WERE DEAD OR WISHED YOU COULD GO TO SLEEP AND NOT WAKE UP?: NO
6. HAVE YOU EVER DONE ANYTHING, STARTED TO DO ANYTHING, OR PREPARED TO DO ANYTHING TO END YOUR LIFE?: NO

## 2025-01-06 ASSESSMENT — PAIN SCALES - GENERAL: PAINLEVEL_OUTOF10: 9

## 2025-01-06 ASSESSMENT — PAIN - FUNCTIONAL ASSESSMENT: PAIN_FUNCTIONAL_ASSESSMENT: 0-10

## 2025-01-06 NOTE — ED TRIAGE NOTES
TRIAGE NOTE   I saw the patient as the Clinician in Triage and performed a brief history and physical exam, established acuity, and ordered appropriate tests to develop basic plan of care. Patient will be seen by an RANDA, resident and/or physician who will independently evaluate the patient. Please see subsequent provider notes for further details and disposition.     Brief HPI: In brief, Lilli Aldridge is a 64 y.o. female that presents for right upper quadrant postprandial pain waxing waning for 3 weeks.  No history of gallbladder disease.  Has had some indigestion and heartburn unrelieved with antacids.  No precordial chest pain or other cardiorespiratory symptoms.  No fever.  No melena constipation diarrhea.  No urinary symptoms.     Focused Physical exam:   Vital signs are stable.  Afebrile.  No tachycardia or tachypnea.  No respiratory difficulty.  No increased work of breathing.  Abdomen soft with right upper quadrant tenderness.  No guarding.  No lower abdominal tenderness.    Plan/MDM:   Workup initiated.  Stable pending bed availability and further evaluation.  Please see subsequent provider note for further details and disposition

## 2025-01-13 ENCOUNTER — DOCUMENTATION (OUTPATIENT)
Dept: PHYSICAL THERAPY | Facility: CLINIC | Age: 65
End: 2025-01-13
Payer: COMMERCIAL

## 2025-01-13 NOTE — PROGRESS NOTES
Physical Therapy    Discharge Summary    Name: Lilli Aldridge  MRN: [id number]  : 1960  Date: 25    Discharge Summary: PT    Discharge Information: Date of last visit 24    Therapy Summary: Pt doing well overall; still having radicular symptoms and is following with pain management  Pelvic pain tolerable at this time     Discharge Status: will discharge to [facility]      Rehab Discharge Reason: Progress plateaued; further improvement possible

## 2025-01-14 ENCOUNTER — APPOINTMENT (OUTPATIENT)
Dept: PHYSICAL THERAPY | Facility: CLINIC | Age: 65
End: 2025-01-14
Payer: COMMERCIAL

## 2025-01-14 LAB
ATRIAL RATE: 68 BPM
P AXIS: 81 DEGREES
P OFFSET: 166 MS
P ONSET: 124 MS
PR INTERVAL: 206 MS
Q ONSET: 227 MS
QRS COUNT: 12 BEATS
QRS DURATION: 74 MS
QT INTERVAL: 414 MS
QTC CALCULATION(BAZETT): 440 MS
QTC FREDERICIA: 431 MS
R AXIS: 85 DEGREES
T AXIS: 52 DEGREES
T OFFSET: 434 MS
VENTRICULAR RATE: 68 BPM

## 2025-02-06 ENCOUNTER — APPOINTMENT (OUTPATIENT)
Dept: GASTROENTEROLOGY | Facility: CLINIC | Age: 65
End: 2025-02-06
Payer: COMMERCIAL

## 2025-02-06 DIAGNOSIS — K58.9 IRRITABLE BOWEL SYNDROME, UNSPECIFIED TYPE: Primary | ICD-10-CM

## 2025-02-06 DIAGNOSIS — R10.33 PERIUMBILICAL ABDOMINAL PAIN: ICD-10-CM

## 2025-02-06 DIAGNOSIS — K57.30 DIVERTICULOSIS OF LARGE INTESTINE WITHOUT DIVERTICULITIS: ICD-10-CM

## 2025-02-06 PROCEDURE — 99214 OFFICE O/P EST MOD 30 MIN: CPT | Performed by: INTERNAL MEDICINE

## 2025-02-06 RX ORDER — DICYCLOMINE HYDROCHLORIDE 10 MG/1
10 CAPSULE ORAL 2 TIMES DAILY
Qty: 60 CAPSULE | Refills: 1 | Status: SHIPPED | OUTPATIENT
Start: 2025-02-06 | End: 2025-04-07

## 2025-02-06 NOTE — PROGRESS NOTES
REASON FOR VISIT: Discuss abdominal symptoms    HPI:  Lilli Aldridge is a 64 y.o. female seen last for colonoscopy 18 months without significant findings then here with 2-month history of periumbilical mid abdominal discomfort and burning sensation.  Symptoms are intermittent.  Not postprandial in nature.   No nausea vomiting or weight loss.  No diarrhea or any changes in bowel habits.  No rectal bleeding or melena.  He recently seen in urgent care and CT imaging showed no inflammatory findings in the bowel.  Labs normal with normal LFTs and normal CBC without anemia or elevated white blood cell count.  No nocturnal symptoms otherwise.  No heartburn or dysphagia.  Has had prior hysterectomy.  Also has symptoms of chronic pelvic pain and sees physical therapy for that.  Therapy has helped with that pain.          PRIOR ENDOSCOPY    PAST MEDICAL HISTORY  Past Medical History:   Diagnosis Date    Cervical high risk human papillomavirus (HPV) DNA test positive     Cervical high risk HPV (human papillomavirus) test positive    Cough variant asthma ([facility]) 11/25/2017    Cough variant asthma    Lateral epicondylitis, unspecified elbow     Lateral epicondylitis (tennis elbow)    Personal history of diseases of the blood and blood-forming organs and certain disorders involving the immune mechanism     History of neutropenia    Personal history of other diseases of the musculoskeletal system and connective tissue     History of scoliosis    Urge incontinence 09/16/2022    Urge incontinence    Urinary tract infection, site not specified 09/15/2022    Recurrent UTI       PAST SURGICAL HISTORY  Past Surgical History:   Procedure Laterality Date    BACK SURGERY  07/06/2016    Back Surgery    HYSTERECTOMY  07/06/2016    Hysterectomy    MOUTH SURGERY  11/09/2017    Oral Surgery Tooth Extraction    OTHER SURGICAL HISTORY  11/09/2017    Cervical Conization By Cold Knife    TONSILLECTOMY  11/09/2017    Tonsillectomy    TUBAL  LIGATION  07/06/2016    Tubal Ligation       FAMILY HISTORY  Family History   Problem Relation Name Age of Onset    Other (Diabetes mellitus) Mother      Hypertension Mother      Cataracts Mother      Glaucoma Mother      Other (Diabetes mellitus) Father      Hypertension Father      Liver cancer Father      Cataracts Father      Glaucoma Father      Decreased libido Sister         SOCIAL HISTORY  Social History     Tobacco Use    Smoking status: Never     Passive exposure: Never    Smokeless tobacco: Never   Substance Use Topics    Alcohol use: Never       REVIEW OF SYSTEMS  CONSTITUTIONAL: negative for fever, chills, fatigue, or unintentional weight loss,   HEENT: negative for icteric sclera, eye pain/redness, or changes in vision/hearing  RESPIRATORY: negative for cough, hemoptysis, wheezing, orthopnea, or dyspnea on exertion  CARDIOVASCULAR: negative for chest pain, palpitations, or syncope   GASTROINTESTINAL: as noted per HPI  GENITOURINARY: negative for dysuria, polyuria, incontinence, or hematuria  MUSCULOSKELETAL: negative for arthralgia, myalgia, or joint swelling/stiffness   INTEGUMENTARY/SKIN: negative jaundice, rash, or skin lesion  HEMATOLOGIC/LYMPHATIC: negative for prolonged bleeding, easy bruising, or swollen lymph nodes  ENDOCRINE: negative for cold/heat intolerance, polydipsia, polyuria, or goiter  NEUROLOGIC: negative for headaches, dizziness, tremor, or gait abnormality  PSYCHIATRIC: negative for anxiety, depression, personality changes, or sleep disturbances      A 10 point review of systems was completed and was otherwise negative.    ALLERGIES  Allergies   Allergen Reactions    Penicillins Hives and Unknown     Nausea    Cat Dander Unknown    Dog Dander Unknown    Erythromycin Other, Nausea And Vomiting, Nausea Only and Nausea/vomiting    Statins-Hmg-Coa Reductase Inhibitors Other     Muscle tightness and pain       MEDICATIONS  Current Outpatient Medications   Medication Sig Dispense Refill     acetaminophen (Tylenol 8 HOUR) 650 mg ER tablet Take 1 tablet (650 mg) by mouth. 3-4 times daily as needed for pain      acetaminophen (TylenoL) 325 mg tablet Take 2 tablets (650 mg) by mouth every 6 hours if needed (For pain.).      albuterol 90 mcg/actuation inhaler Inhale 2 puffs every 6 hours if needed.      albuterol 90 mcg/actuation inhaler Inhale 2 puffs every 6 hours if needed for wheezing. 18 g 0    calcium carbonate-vitamin D3 1,000 mg-20 mcg (800 unit) tablet Calcium 1000 + D 1000-800 MG-UNIT Oral Tablet Take 1 tablet daily Quantity: 0 Refills: 0 Ordered: 2-Sep-2021 Miracle Velasco MD Start : 2-Sep-2021 Active      chlorpheniramine (Chlor-Trimeton) 4 mg tablet Take 1 tablet (4 mg) by mouth. Every 4-6 hours as needed.      flunisolide (Nasalide) 25 mcg (0.025 %) spray,non-aerosol Administer 2 sprays into each nostril 2 times a day.      loratadine (Claritin) 10 mg tablet Take 1 tablet (10 mg) by mouth once daily.      meloxicam (Mobic) 7.5 mg tablet Take 1-2 tablets (7.5-15 mg) by mouth once daily. TAKE 1 TO 2 TABLETS(7.5 TO 15 MG) BY MOUTH DAILY 60 tablet 2    topiramate (Topamax) 100 mg tablet Take 1 tablet (100 mg) by mouth once daily at bedtime. 90 tablet 1    topiramate (Topamax) 25 mg tablet Take 1 tablet (25 mg) by mouth once daily. 90 tablet 1     No current facility-administered medications for this visit.       VITALS  There were no vitals taken for this visit.     PHYSICAL EXAM  Alert and oriented in no acute distress  Abdomen: Soft, minimal periumbilical tenderness to palpation, no rebound tenderness, abdomen soft, no hepatosplenomegaly    ASSESSMENT/ PLAN  Patient with intermittent mid abdominal discomfort burning in sensation she states.  At times worse with position changes.  Discussed with her negative CT imaging for inflammatory process.  May be abdominal adhesions from her history of hysterectomy.  Also possible postinfectious IBS.  I suggested trial of dicyclomine to see if that helps  with her pain symptoms.  Given negative CT imaging do not see high yield at this time with endoscopic evaluation specially considering she has had a colonoscopy within the last 2 years.  She will see me back in 6 to 8 weeks to reassess symptoms.  She is in agreement with the plan.        Signature: Yared Merritt MD    Date: 2/6/2025  Time: 2:57 PM

## 2025-02-18 ENCOUNTER — APPOINTMENT (OUTPATIENT)
Dept: RADIOLOGY | Facility: HOSPITAL | Age: 65
End: 2025-02-18
Payer: COMMERCIAL

## 2025-02-18 ENCOUNTER — HOSPITAL ENCOUNTER (EMERGENCY)
Facility: HOSPITAL | Age: 65
Discharge: HOME | End: 2025-02-18
Payer: COMMERCIAL

## 2025-02-18 VITALS
HEIGHT: 64 IN | TEMPERATURE: 97.5 F | BODY MASS INDEX: 23.05 KG/M2 | DIASTOLIC BLOOD PRESSURE: 80 MMHG | SYSTOLIC BLOOD PRESSURE: 134 MMHG | WEIGHT: 135 LBS | RESPIRATION RATE: 18 BRPM | OXYGEN SATURATION: 100 % | HEART RATE: 67 BPM

## 2025-02-18 DIAGNOSIS — S09.90XA INJURY OF HEAD, INITIAL ENCOUNTER: ICD-10-CM

## 2025-02-18 DIAGNOSIS — S32.10XA CLOSED FRACTURE OF SACRUM, UNSPECIFIED PORTION OF SACRUM, INITIAL ENCOUNTER (MULTI): Primary | ICD-10-CM

## 2025-02-18 PROCEDURE — 72192 CT PELVIS W/O DYE: CPT

## 2025-02-18 PROCEDURE — 99221 1ST HOSP IP/OBS SF/LOW 40: CPT | Performed by: ORTHOPAEDIC SURGERY

## 2025-02-18 PROCEDURE — 70450 CT HEAD/BRAIN W/O DYE: CPT

## 2025-02-18 PROCEDURE — 2500000005 HC RX 250 GENERAL PHARMACY W/O HCPCS: Performed by: NURSE PRACTITIONER

## 2025-02-18 PROCEDURE — 72131 CT LUMBAR SPINE W/O DYE: CPT | Performed by: RADIOLOGY

## 2025-02-18 PROCEDURE — 70450 CT HEAD/BRAIN W/O DYE: CPT | Performed by: RADIOLOGY

## 2025-02-18 PROCEDURE — 72131 CT LUMBAR SPINE W/O DYE: CPT

## 2025-02-18 PROCEDURE — 72128 CT CHEST SPINE W/O DYE: CPT | Performed by: RADIOLOGY

## 2025-02-18 PROCEDURE — 73523 X-RAY EXAM HIPS BI 5/> VIEWS: CPT

## 2025-02-18 PROCEDURE — 71111 X-RAY EXAM RIBS/CHEST4/> VWS: CPT | Performed by: RADIOLOGY

## 2025-02-18 PROCEDURE — 72128 CT CHEST SPINE W/O DYE: CPT

## 2025-02-18 PROCEDURE — 73523 X-RAY EXAM HIPS BI 5/> VIEWS: CPT | Mod: BILATERAL PROCEDURE | Performed by: RADIOLOGY

## 2025-02-18 PROCEDURE — 72190 X-RAY EXAM OF PELVIS: CPT

## 2025-02-18 PROCEDURE — 72125 CT NECK SPINE W/O DYE: CPT | Performed by: RADIOLOGY

## 2025-02-18 PROCEDURE — 72125 CT NECK SPINE W/O DYE: CPT

## 2025-02-18 PROCEDURE — 71111 X-RAY EXAM RIBS/CHEST4/> VWS: CPT

## 2025-02-18 PROCEDURE — 2500000001 HC RX 250 WO HCPCS SELF ADMINISTERED DRUGS (ALT 637 FOR MEDICARE OP): Performed by: NURSE PRACTITIONER

## 2025-02-18 PROCEDURE — 99284 EMERGENCY DEPT VISIT MOD MDM: CPT | Mod: 25

## 2025-02-18 PROCEDURE — 72190 X-RAY EXAM OF PELVIS: CPT | Mod: BILATERAL PROCEDURE | Performed by: RADIOLOGY

## 2025-02-18 RX ORDER — ACETAMINOPHEN 325 MG/1
650 TABLET ORAL EVERY 6 HOURS PRN
Qty: 20 TABLET | Refills: 0 | Status: SHIPPED | OUTPATIENT
Start: 2025-02-18 | End: 2025-02-23

## 2025-02-18 RX ORDER — OXYCODONE AND ACETAMINOPHEN 5; 325 MG/1; MG/1
1 TABLET ORAL ONCE
Status: COMPLETED | OUTPATIENT
Start: 2025-02-18 | End: 2025-02-18

## 2025-02-18 RX ORDER — IBUPROFEN 600 MG/1
600 TABLET ORAL ONCE
Status: COMPLETED | OUTPATIENT
Start: 2025-02-18 | End: 2025-02-18

## 2025-02-18 RX ORDER — LIDOCAINE 560 MG/1
1 PATCH PERCUTANEOUS; TOPICAL; TRANSDERMAL DAILY
Status: DISCONTINUED | OUTPATIENT
Start: 2025-02-18 | End: 2025-02-18 | Stop reason: HOSPADM

## 2025-02-18 RX ORDER — METHOCARBAMOL 500 MG/1
500 TABLET, FILM COATED ORAL 3 TIMES DAILY
Qty: 9 TABLET | Refills: 0 | Status: SHIPPED | OUTPATIENT
Start: 2025-02-18 | End: 2025-02-21

## 2025-02-18 RX ADMIN — OXYCODONE HYDROCHLORIDE AND ACETAMINOPHEN 1 TABLET: 5; 325 TABLET ORAL at 10:50

## 2025-02-18 RX ADMIN — LIDOCAINE 4% 1 PATCH: 40 PATCH TOPICAL at 14:17

## 2025-02-18 RX ADMIN — IBUPROFEN 600 MG: 600 TABLET, FILM COATED ORAL at 14:17

## 2025-02-18 ASSESSMENT — PAIN DESCRIPTION - LOCATION
LOCATION: GENERALIZED
LOCATION: BACK

## 2025-02-18 ASSESSMENT — ENCOUNTER SYMPTOMS
JOINT SWELLING: 0
SHORTNESS OF BREATH: 0
NECK PAIN: 1
HEADACHES: 1
DIZZINESS: 0
RHINORRHEA: 0
WEAKNESS: 0
SPEECH DIFFICULTY: 0
SORE THROAT: 0
CHILLS: 0
WOUND: 0
FEVER: 0
DYSURIA: 0
COUGH: 0
NAUSEA: 0
VOMITING: 0
DIARRHEA: 0
ABDOMINAL PAIN: 0
CONSTIPATION: 0
BACK PAIN: 1

## 2025-02-18 ASSESSMENT — PAIN SCALES - GENERAL
PAINLEVEL_OUTOF10: 8
PAINLEVEL_OUTOF10: 6
PAINLEVEL_OUTOF10: 0 - NO PAIN
PAINLEVEL_OUTOF10: 6
PAINLEVEL_OUTOF10: 0 - NO PAIN

## 2025-02-18 ASSESSMENT — PAIN DESCRIPTION - PAIN TYPE: TYPE: ACUTE PAIN

## 2025-02-18 ASSESSMENT — COLUMBIA-SUICIDE SEVERITY RATING SCALE - C-SSRS
1. IN THE PAST MONTH, HAVE YOU WISHED YOU WERE DEAD OR WISHED YOU COULD GO TO SLEEP AND NOT WAKE UP?: NO
2. HAVE YOU ACTUALLY HAD ANY THOUGHTS OF KILLING YOURSELF?: NO
6. HAVE YOU EVER DONE ANYTHING, STARTED TO DO ANYTHING, OR PREPARED TO DO ANYTHING TO END YOUR LIFE?: NO

## 2025-02-18 ASSESSMENT — PAIN - FUNCTIONAL ASSESSMENT
PAIN_FUNCTIONAL_ASSESSMENT: 0-10
PAIN_FUNCTIONAL_ASSESSMENT: 0-10

## 2025-02-18 NOTE — ED PROVIDER NOTES
HPI   Chief Complaint   Patient presents with    Fall    Head Injury       HPI    This is a 64 year old female with no significant medical history who presents to the Emergency Department today with complaints of fall, head injury and tailbone pain. Reports that she slipped on ice on Thursday, fell backwards and struck her head. She denies any LOC. Endorses generalize head 'pressure' since Friday along with tailbone pain. She reports increased pain when climbing steps and with movement. No difficulty urinating or pain with bowel movements. Additionally, reports bilateral rib pain. No associated chest pain or SOB. Denies fevers, dizziness, vision changes, paresthesias, weakness. Denies use  of anticoagulants.     Review of Systems   Constitutional:  Negative for chills and fever.   HENT:  Negative for ear pain, rhinorrhea and sore throat.    Respiratory:  Negative for cough and shortness of breath.    Cardiovascular:  Negative for chest pain.   Gastrointestinal:  Negative for abdominal pain, constipation, diarrhea, nausea and vomiting.   Genitourinary:  Negative for dysuria.   Musculoskeletal:  Positive for back pain and neck pain. Negative for gait problem and joint swelling.   Skin:  Negative for wound.   Neurological:  Positive for headaches. Negative for dizziness, syncope, speech difficulty and weakness.         Patient History   Past Medical History:   Diagnosis Date    Cervical high risk human papillomavirus (HPV) DNA test positive     Cervical high risk HPV (human papillomavirus) test positive    Cough variant asthma ([facility]) 11/25/2017    Cough variant asthma    Lateral epicondylitis, unspecified elbow     Lateral epicondylitis (tennis elbow)    Personal history of diseases of the blood and blood-forming organs and certain disorders involving the immune mechanism     History of neutropenia    Personal history of other diseases of the musculoskeletal system and connective tissue     History of scoliosis     Urge incontinence 09/16/2022    Urge incontinence    Urinary tract infection, site not specified 09/15/2022    Recurrent UTI     Past Surgical History:   Procedure Laterality Date    BACK SURGERY  07/06/2016    Back Surgery    HYSTERECTOMY  07/06/2016    Hysterectomy    MOUTH SURGERY  11/09/2017    Oral Surgery Tooth Extraction    OTHER SURGICAL HISTORY  11/09/2017    Cervical Conization By Cold Knife    TONSILLECTOMY  11/09/2017    Tonsillectomy    TUBAL LIGATION  07/06/2016    Tubal Ligation     Family History   Problem Relation Name Age of Onset    Other (Diabetes mellitus) Mother      Hypertension Mother      Cataracts Mother      Glaucoma Mother      Other (Diabetes mellitus) Father      Hypertension Father      Liver cancer Father      Cataracts Father      Glaucoma Father      Decreased libido Sister       Social History     Tobacco Use    Smoking status: Never     Passive exposure: Never    Smokeless tobacco: Never   Substance Use Topics    Alcohol use: Never    Drug use: Never       Physical Exam   ED Triage Vitals [02/18/25 0930]   Temperature Heart Rate Respirations BP   36.4 °C (97.5 °F) 87 16 (!) 156/99      Pulse Ox Temp src Heart Rate Source Patient Position   100 % -- -- --      BP Location FiO2 (%)     -- --       Physical Exam  Vitals reviewed.   Constitutional:       Appearance: She is not ill-appearing.   HENT:      Head: Normocephalic and atraumatic.      Right Ear: Tympanic membrane, ear canal and external ear normal.      Left Ear: Tympanic membrane, ear canal and external ear normal.      Mouth/Throat:      Mouth: Mucous membranes are moist.      Pharynx: No oropharyngeal exudate or posterior oropharyngeal erythema.   Eyes:      Extraocular Movements: Extraocular movements intact.      Pupils: Pupils are equal, round, and reactive to light.   Cardiovascular:      Rate and Rhythm: Normal rate and regular rhythm.      Heart sounds: Normal heart sounds.   Pulmonary:      Effort: Pulmonary effort  is normal. No respiratory distress.      Breath sounds: Normal breath sounds. No wheezing, rhonchi or rales.   Abdominal:      General: Bowel sounds are normal. There is no distension.      Palpations: Abdomen is soft.      Tenderness: There is no abdominal tenderness. There is no guarding or rebound.   Musculoskeletal:         General: Normal range of motion.      Cervical back: Normal range of motion and neck supple. No rigidity or tenderness.      Right lower leg: No edema.      Left lower leg: No edema.      Comments: +thoracic and lumbar spine tenderness. No cervical spine tenderness   Skin:     General: Skin is warm and dry.      Findings: No bruising.   Neurological:      General: No focal deficit present.      Mental Status: She is alert and oriented to person, place, and time.      Sensory: No sensory deficit.      Motor: No weakness.      Coordination: Coordination normal.      Gait: Gait normal.           ED Course & MDM   Diagnoses as of 02/18/25 1239   Closed fracture of sacrum, unspecified portion of sacrum, initial encounter (Multi)   Injury of head, initial encounter             XR ribs 3 views bilateral w chest pa or ap    Result Date: 2/18/2025  Interpreted By:  Griselda Knight, STUDY: XR RIBS 3 VIEWS BILATERAL WITH CHEST PA OR AP;   INDICATION: Signs/Symptoms:bilateral rib pain s/p fall on ice.   COMPARISON: Chest radiograph 11/30/2022.   ACCESSION NUMBER(S): [id number]   ORDERING CLINICIAN: CIPRIANO EVANS   FINDINGS: The cardiac silhouette size is within normal limits. There is no focal consolidation, edema or pneumothorax. No sizeable pleural effusion. No displaced rib fractures bilaterally. No acute osseous abnormality.       No acute cardiopulmonary process.   No displaced rib fractures bilaterally.   Signed by: Griselda Knight 2/18/2025 12:11 PM Dictation workstation:   [id number]    CT thoracic spine wo IV contrast    Result Date: 2/18/2025  Interpreted By:  Nelson Curry, STUDY: CT  LUMBAR SPINE WO IV CONTRAST; CT THORACIC SPINE WO IV CONTRAST 2/18/2025 11:17 am   INDICATION: Signs/Symptoms:tailbone pain s/p fall on ice; Signs/Symptoms:back pain s/p fall     COMPARISON: CT of the abdomen and pelvis dated 01/06/2025   ACCESSION NUMBER(S): [id number]; [id number]   ORDERING CLINICIAN: CIPRIANO EVANS   TECHNIQUE: Thin cut axial CT images through the thoracic and lumbar spine were obtained.   FINDINGS: The obtained images include the thoracic and lumbar spine and extend caudally to the S4 level of the sacrum.   There is a nondisplaced fracture involving the posterior elements of the partially imaged sacrum at the S3 level best appreciated on the sagittal and coronal reconstructed images. There is additional questionable subtle cortical irregularity along the anterior margin of the S4 vertebral body with an adjacent vague curvilinear lucency at the S4 level raising the possibility of an additional nondisplaced sacral fracture.   No acute fracture of the thoracic or lumbar spine is noted.   There is a levocurvature of the lumbar spine.   There is grade 1 anterolisthesis of L4 on L5. There is a minimal retrolisthesis of T11 on T12, T12 on L1, and L1 on L2.   There is multilevel spondylosis within the thoracic and lumbar regions. The soft tissues of the spinal canal and neural foramen are suboptimally evaluated on this CT study.       The obtained images include the thoracic and lumbar spine and extend caudally to the S4 level of the sacrum.   There is a nondisplaced fracture involving the posterior elements of the partially imaged sacrum at the S3 level best appreciated on the sagittal and coronal reconstructed images. There is additional questionable subtle cortical irregularity along the anterior margin of the S4 vertebral body with an adjacent vague curvilinear lucency at the S4 level raising the possibility of an additional nondisplaced sacral fracture.   No acute fracture of the thoracic or  lumbar spine is noted.   There is a levocurvature of the lumbar spine.   There is grade 1 anterolisthesis of L4 on L5. There is a minimal retrolisthesis of T11 on T12, T12 on L1, and L1 on L2.   There is multilevel spondylosis within the thoracic and lumbar regions. The soft tissues of the spinal canal and neural foramen are suboptimally evaluated on this CT study.   MACRO: None   Signed by: Nelson Curry 2/18/2025 11:48 AM Dictation workstation:   [id number]    CT lumbar spine wo IV contrast    Result Date: 2/18/2025  Interpreted By:  Nelson Curry, STUDY: CT LUMBAR SPINE WO IV CONTRAST; CT THORACIC SPINE WO IV CONTRAST 2/18/2025 11:17 am   INDICATION: Signs/Symptoms:tailbone pain s/p fall on ice; Signs/Symptoms:back pain s/p fall     COMPARISON: CT of the abdomen and pelvis dated 01/06/2025   ACCESSION NUMBER(S): [id number]; [id number]   ORDERING CLINICIAN: CIPRIANO EVANS   TECHNIQUE: Thin cut axial CT images through the thoracic and lumbar spine were obtained.   FINDINGS: The obtained images include the thoracic and lumbar spine and extend caudally to the S4 level of the sacrum.   There is a nondisplaced fracture involving the posterior elements of the partially imaged sacrum at the S3 level best appreciated on the sagittal and coronal reconstructed images. There is additional questionable subtle cortical irregularity along the anterior margin of the S4 vertebral body with an adjacent vague curvilinear lucency at the S4 level raising the possibility of an additional nondisplaced sacral fracture.   No acute fracture of the thoracic or lumbar spine is noted.   There is a levocurvature of the lumbar spine.   There is grade 1 anterolisthesis of L4 on L5. There is a minimal retrolisthesis of T11 on T12, T12 on L1, and L1 on L2.   There is multilevel spondylosis within the thoracic and lumbar regions. The soft tissues of the spinal canal and neural foramen are suboptimally evaluated on this CT study.       The  obtained images include the thoracic and lumbar spine and extend caudally to the S4 level of the sacrum.   There is a nondisplaced fracture involving the posterior elements of the partially imaged sacrum at the S3 level best appreciated on the sagittal and coronal reconstructed images. There is additional questionable subtle cortical irregularity along the anterior margin of the S4 vertebral body with an adjacent vague curvilinear lucency at the S4 level raising the possibility of an additional nondisplaced sacral fracture.   No acute fracture of the thoracic or lumbar spine is noted.   There is a levocurvature of the lumbar spine.   There is grade 1 anterolisthesis of L4 on L5. There is a minimal retrolisthesis of T11 on T12, T12 on L1, and L1 on L2.   There is multilevel spondylosis within the thoracic and lumbar regions. The soft tissues of the spinal canal and neural foramen are suboptimally evaluated on this CT study.   MACRO: None   Signed by: Nelson Curry 2/18/2025 11:48 AM Dictation workstation:   [id number]    CT cervical spine wo IV contrast    Result Date: 2/18/2025  Interpreted By:  Nelson Curry, STUDY: CT CERVICAL SPINE WO IV CONTRAST; ;  2/18/2025 11:17 am   INDICATION: Signs/Symptoms:posterior neck pain s/p fall.     COMPARISON: None.   ACCESSION NUMBER(S): [id number]   ORDERING CLINICIAN: CIPRIANO EVANS   TECHNIQUE: Thin cut axial CT images through the cervical spine were obtained and reconstructed in the coronal and sagittal planes.   FINDINGS: There is no acute cervical spine fracture.   The cervical vertebral bodies are in anatomic alignment.   There is multilevel cervical spondylosis with the most pronounced anterior and posterior osteophytic spurring noted at the C5/6 and C6/7 levels. Degenerative uncovertebral joint changes and degenerative facet changes contributing to bony encroachment upon the right neural foramen at the C5/6 and to a lesser degree C6/7 levels. There is mild bony  encroachment upon the left neural foramen at the C5/6 level. The soft tissues of the spinal canal and neural foramen are suboptimally evaluated on this CT study.   There is elongation of the styloid processes as well as ossification of the stylohyoid ligaments right greater than left. The finding is nonspecific and can be seen incidentally in asymptomatic patients as well as in patients with underlying Eagle syndrome.       There is no acute cervical spine fracture.   There is multilevel cervical spondylosis as noted above.   There is elongation of the styloid processes as well as ossification of the stylohyoid ligaments right greater than left. The finding is nonspecific and can be seen incidentally in asymptomatic patients as well as in patients with underlying Eagle syndrome.   MACRO: None   Signed by: Nelson Curry 2/18/2025 11:32 AM Dictation workstation:   [id number]    CT head wo IV contrast    Result Date: 2/18/2025  Interpreted By:  Nelson Curry, STUDY: CT HEAD WO IV CONTRAST;  2/18/2025 11:17 am   INDICATION: Signs/Symptoms:head injury s/p fall.   COMPARISON: CT dated 03/23/2022   ACCESSION NUMBER(S): [id number]   ORDERING CLINICIAN: CIPRIANO EVANS   TECHNIQUE: Axial CT images of the head were obtained without intravenous contrast administration.   FINDINGS: The ventricular system is nondilated.   There is no hyperdense acute intracranial hemorrhage or definite acute cortical infarction.   There is no midline shift.   There is again evidence of likely incidental scattered dural-based calcifications/ossifications.   The visualized paranasal sinuses and mastoid air cells are clear.   No acute fracture is noted.       There is no hyperdense acute intracranial hemorrhage or acute fracture.   MACRO: None.   Signed by: Nelson Curry 2/18/2025 11:27 AM Dictation workstation:   [id number]      No data recorded     [place] Coma Scale Score: 15 (02/18/25 1025 : Riaz Skelton RN)                           Medical  Decision Making  The patient remains clinically stable while in the ED. 64 year old female presents to the ED with complaints of mechanical fall 5 days ago which occurred after slipping and falling on ice. Reports that she fell backwards, striking her head and back. Denies any LOC. Not on anticoagulants. Reports generalized headache along with tailbone pain and bilateral rib pain. Assoc symptoms include some paresthesias to bilateral upper legs. No bowel/bladder incontinence. Has been able to ambulate with some discomfort . Neuro exam intact without deficits. Denies any visual changes, dizziness, n/v, CP, SOB. CT head unremarkable. CT C/T/L spine with nondisplaced fracture of S3 and questionable S4 nondisplaced fracture. Ortho spine consulted for further evaluation.     Procedure  Procedures

## 2025-02-18 NOTE — ED TRIAGE NOTES
States she fell Thursday and hit her head and tail bone. No loc, no thinners. C/o pain in shoulders and going down legs

## 2025-02-18 NOTE — PROGRESS NOTES
Emergency Medicine Transition of Care Note.    I received Lilli Aldridge in signout from previous team.  Please see the previous ED provider note for all HPI, PE and MDM up to the time of signout at 1400. This is in addition to the primary record.    In brief Lilli Aldridge is an 64 y.o. female presenting for slip and fall with sacral pain at the time of signout we were awaiting: CT and x-ray results with Ortho consult recommendations.    Diagnoses as of 02/18/25 1520   Closed fracture of sacrum, unspecified portion of sacrum, initial encounter (Multi)   Injury of head, initial encounter       Labs Reviewed - No data to display    CT pelvis wo IV contrast   Final Result   Nondisplaced fracture through the posterior elements of the sacrum at   the S3 level        MACRO:   None        Signed by: Popeye Virk 2/18/2025 2:47 PM   Dictation workstation:   [id number]      XR pelvis With Inlet/Outlet views   Final Result   1. Patient's known subtle nondisplaced sacral fracture is not well   appreciated due to radiographic modality.   2. Pelvic ring is intact.   3. Lower lumbar facet arthropathy.   4. Mild bilateral hip osteoarthrosis.        MACRO:   None.        Signed by: Griselda Knight 2/18/2025 2:20 PM   Dictation workstation:   [id number]      XR hips bilateral 5+ VW w pelvis when performed   Final Result   1. Patient's known subtle nondisplaced sacral fracture is not well   appreciated due to radiographic modality.   2. Pelvic ring is intact.   3. Lower lumbar facet arthropathy.   4. Mild bilateral hip osteoarthrosis.        MACRO:   None.        Signed by: Griselda Knight 2/18/2025 2:20 PM   Dictation workstation:   [id number]      CT head wo IV contrast   Final Result   There is no hyperdense acute intracranial hemorrhage or acute   fracture.        MACRO:   None.        Signed by: Nelson Curry 2/18/2025 11:27 AM   Dictation workstation:   [id number]      CT cervical spine wo IV contrast   Final Result    There is no acute cervical spine fracture.        There is multilevel cervical spondylosis as noted above.        There is elongation of the styloid processes as well as ossification   of the stylohyoid ligaments right greater than left. The finding is   nonspecific and can be seen incidentally in asymptomatic patients as   well as in patients with underlying Eagle syndrome.        MACRO:   None        Signed by: Nelson Curry 2/18/2025 11:32 AM   Dictation workstation:   [id number]      CT thoracic spine wo IV contrast   Final Result   The obtained images include the thoracic and lumbar spine and extend   caudally to the S4 level of the sacrum.        There is a nondisplaced fracture involving the posterior elements of   the partially imaged sacrum at the S3 level best appreciated on the   sagittal and coronal reconstructed images. There is additional   questionable subtle cortical irregularity along the anterior margin   of the S4 vertebral body with an adjacent vague curvilinear lucency   at the S4 level raising the possibility of an additional nondisplaced   sacral fracture.        No acute fracture of the thoracic or lumbar spine is noted.        There is a levocurvature of the lumbar spine.        There is grade 1 anterolisthesis of L4 on L5. There is a minimal   retrolisthesis of T11 on T12, T12 on L1, and L1 on L2.        There is multilevel spondylosis within the thoracic and lumbar   regions. The soft tissues of the spinal canal and neural foramen are   suboptimally evaluated on this CT study.        MACRO:   None        Signed by: Nelson Curry 2/18/2025 11:48 AM   Dictation workstation:   [id number]      CT lumbar spine wo IV contrast   Final Result   The obtained images include the thoracic and lumbar spine and extend   caudally to the S4 level of the sacrum.        There is a nondisplaced fracture involving the posterior elements of   the partially imaged sacrum at the S3 level best appreciated on the    sagittal and coronal reconstructed images. There is additional   questionable subtle cortical irregularity along the anterior margin   of the S4 vertebral body with an adjacent vague curvilinear lucency   at the S4 level raising the possibility of an additional nondisplaced   sacral fracture.        No acute fracture of the thoracic or lumbar spine is noted.        There is a levocurvature of the lumbar spine.        There is grade 1 anterolisthesis of L4 on L5. There is a minimal   retrolisthesis of T11 on T12, T12 on L1, and L1 on L2.        There is multilevel spondylosis within the thoracic and lumbar   regions. The soft tissues of the spinal canal and neural foramen are   suboptimally evaluated on this CT study.        MACRO:   None        Signed by: Nelson Curry 2/18/2025 11:48 AM   Dictation workstation:   [id number]      XR ribs 3 views bilateral w chest pa or ap   Final Result   No acute cardiopulmonary process.        No displaced rib fractures bilaterally.        Signed by: Griselda Knight 2/18/2025 12:11 PM   Dictation workstation:   [id number]            Medical Decision Making  64-year-old female presented initially for chief complaint of slip and fall on ice and Thursday.  Fell back and hit her head.  She denied losing consciousness.  No anticoagulation use.  Denies any incontinence or retention of urine or stool.  Pain was reported to be worse at the tailbone/sacrum area.  CT head show no acute intracranial hemorrhage or fracture.  CT C-spine showed no acute cervical spine fracture or malalignment.  CT thoracic and lumbar spine showed no acute fracture or malalignment.  However at the lower end of the scan there was some sacrum visible, which showed nondisplaced fractures at S3 and some cortical irregularity and S4.  Orthopedics was consulted.  X-ray of the ribs showed no acute cardiopulmonary process and no displaced rib fractures bilaterally.  Additional imaging was added on per Ortho  recommendations and we are waiting on those results.    X-ray of the hips showed known subtle nondisplaced sacral fracture not well appreciated due to radiographic modality.  Pelvic ring is intact.  X-ray of the pelvis showed the same thing.  CT of the pelvis showed nondisplaced fracture through the posterior elements of the sacrum at the S3 level.    I was able to reach out to orthopedics.  They said no acute intervention is needed at this point.  They recommend rest with a donut pillow for comfort.  They recommend follow-up in 1 week with Dr. Rios.  This was relayed to the patient.  Advised to use Tylenol, her home meloxicam, lidocaine patches, and Robaxin as needed for symptom management.  Advised to not use Robaxin except for when she would like to go to sleep and to be wary of falls on this medication.  Advised to return to the ED with any new or worsening symptoms.  MSPs intact.  Ambulatory with steady gait.  Patient in agreement this plan.  Discharged in stable condition.            Final diagnoses:   [S32.10XA] Closed fracture of sacrum, unspecified portion of sacrum, initial encounter (Multi)   [S09.90XA] Injury of head, initial encounter           Procedure  Procedures    Pratik Woods, APRN-CNP

## 2025-02-19 NOTE — CONSULTS
Orthopedic Surgery Consult Note    History of Present Illness:   64 y.o. year-old female with PMHx remote L5-S1 discectomy presents to [facility] complaining of sacral pain after mGLF 5 days ago during slip on ice. Presented to ED given persistent sacral pain however ambulatory since event. Patient denies numbness/tingling/weakness in lower extremities. Denies bowel/bladder symptoms. Denies saddle anesthesia. Denies hip/groin pain. Patient ambulates independently at baseline and was able to ambulate after the injury. Patient takes nothing for anticoagulation and is not a current smoker. No additional injuries.    Past Medical History:  As noted in HPI.    Past Surgical History:  As above    Social History:   Non-contributory    Family History:  Non-contributory to patient’s acute orthopaedic injury.    ROS:  Comprehensive review of systems negative except as noted in HPI and exam    Objective     Past Medical History  She has a past medical history of Cervical high risk human papillomavirus (HPV) DNA test positive, Cough variant asthma (HHS-HCC) (11/25/2017), Lateral epicondylitis, unspecified elbow, Personal history of diseases of the blood and blood-forming organs and certain disorders involving the immune mechanism, Personal history of other diseases of the musculoskeletal system and connective tissue, Urge incontinence (09/16/2022), and Urinary tract infection, site not specified (09/15/2022).    Surgical History  She has a past surgical history that includes Other surgical history (11/09/2017); Mouth surgery (11/09/2017); Tonsillectomy (11/09/2017); Back surgery (07/06/2016); Hysterectomy (07/06/2016); and Tubal ligation (07/06/2016).     Social History  She reports that she has never smoked. She has never been exposed to tobacco smoke. She has never used smokeless tobacco. She reports that she does not drink alcohol and does not use drugs.    Family History  Family History   Problem Relation Name Age of Onset     "Other (Diabetes mellitus) Mother      Hypertension Mother      Cataracts Mother      Glaucoma Mother      Other (Diabetes mellitus) Father      Hypertension Father      Liver cancer Father      Cataracts Father      Glaucoma Father      Decreased libido Sister          Allergies  Penicillins, Cat dander, Dog dander, Erythromycin, and Statins-hmg-coa reductase inhibitors    Physical Exam:  - Constitutional: No acute distress, cooperative  - Eyes: EOM grossly intact  - Head/Neck: Trachea midline  - Respiratory/Thorax: Normal work of breathing  - Cardiovascular: RRR on peripheral palpation  - Gastrointestinal: Nondistended  - Psychological: Appropriate mood/behavior  - Skin: Warm and dry. Additional findings in musculoskeletal evaluation  - Musculoskeletal:  BLE  - TTP over midline sacrum  - 5/5 strength in BLLE, SILT  - Foot warm, well perfused  - DP/PT pulse, brisk cap refill  - Compartments soft and compressible    Last Recorded Vitals  Blood pressure 134/80, pulse 67, temperature 36.4 °C (97.5 °F), resp. rate 18, height 1.626 m (5' 4\"), weight 61.2 kg (135 lb), SpO2 100%.  Intake/Output last 3 Shifts:  No intake/output data recorded.    Assessment & Plan:  Injury: S3 SP fx    64F (remote hx L5-S1 discectomy) p/a mGLF 5d ago. Ambulatory since event. Denies b/b sx or SA. No groing/LE pain. TTP over sacrum. Closed, NVI. 5/5 motor and SILT BLE. XR/CT w transverse fx through S3 SP fx and cortical irregularity at S4 anterior VB, no pelvic ring involvement.    Recommendations:  - No indication for acute ortho operative intervention  - Weightbearing: WBAT BLE   - Pain meds per primary team/ED  - Antibiotics: None from orthopaedic standpoint   - Diet: Okay for diet from orthopaedic standpoint  - Donut pillow for comfort  - Follow up with Dr. Rios in 1-2 wks for nonop mgmt    Dispo per primary/ED    This patient was seen within 30 minutes of consultation.    Arlin Polanco MD  Orthopaedic Surgery  PGY-2  Resident On " Call  Pager: [phone] (EpicChat preferred)

## 2025-03-03 NOTE — PATIENT INSTRUCTIONS
HAPPY BIRTHDAY!    -Continue Topamax 100 mg at night but try 50 mg in the morning if needed  -Continue Robaxin and meloxicam as needed   -Consider other medications in the future including Lyrica, Cymbalta, nortriptyline  -Consider left knee injection  in the future if needed  -Continue physical therapy daily home exercises  -Do not wear SIJ brace for more than 2 to 3 hours/day.  -consider cornerloc or transloc procedures with Dr. Artis versus iFuse procedure (with Dr. Matute).    -Follow-up with Dr. Coleman to discuss repeat left L4-5 and L5-S1 injection back to back  -[facility] referral provided previously: Dr. Reji Hauser [phone], or [facility] [phone] or Dr. Scott in [place] [phone]  -Follow-up 3 months or sooner if needed

## 2025-03-03 NOTE — PROGRESS NOTES
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 Physical exam is reassuring for lack of neurological compromise. Symptoms and physical exam findings in this complex patient and her of unclear etiology at the moment, but most likely a result of sacroiliac joint dysfunction and reactive myofascial pain/tension, possibly stemming from right knee osteoarthritis. However, lumbar radiculopathy and spondylosis are on the differential, as well as referral from ovarian distention into the posterior right knee, given her history of cervical cancer in the past.    HAPPY BIRTHDAY!    -Continue Topamax 100 mg at night but try 50 mg in the morning if needed  -Continue Robaxin and meloxicam as needed   -Consider other medications in the future including Lyrica, Cymbalta, nortriptyline  -Consider left knee injection  in the future if needed  -Continue physical therapy daily home exercises  -Do not wear SIJ brace for more than 2 to 3 hours/day.  -consider cornerloc or transloc procedures with Dr. Artis versus iFuse procedure (with Dr. Matute).    -Follow-up with Dr. Coleman to discuss repeat left L4-5 and L5-S1 injection back to back  -OMT referral provided previously: Dr. Reji Hauser [phone], or [facility] [phone] or Dr. Scott in [place] [phone]  -Follow-up 3 months or sooner if needed    The patient expressed understanding and agreement with the assessment and plan. Patient encouraged to contact us should they have any questions, concerns, or any changes in symptoms.     Thank you for allowing me to participate in the care of your patient.      ** Dictated with voice recognition software, please forgive any errors in grammar and/or spelling **      Chief Complaint    Follow up on back,  bilateral shoulder and right knee pain      History of Present Illness    This is a pleasant 65-year-old right-handed woman with past medical history significant for BPPV, HLD, cervical carcinoma in situ 2012 s/p hysterectomy, who presents for follow-up chronic back, shoulder, and right knee pain.    She was last seen here on 11/27/2024, at which point I advised her to continue Topamax 100 mg at night but try 25 mg in the morning.    I also advised her to try tumeric and to continue Robaxin and meloxicam as needed. Tumeric helped but she was having stomach upset. So she stopped taking it 2 days ago.     I advised her to continue her physical therapy and home exercises.    Advised her to follow-up with Dr. Coleman to discuss repeat left L4-5 and L5-S1 injections back-to-back as the first ones it helped especially in the thigh.    I referred her to OMT.    I ordered a left knee x-ray and this was done on 12/2/2024 and it showed chondrocalcinosis in the lateral compartment    She was in the ER on 2/18/2025 after a fall, when she slipped on ice and fell backward and hit her head 5 days prior.  Imaging was ordered including her hips.,  Possible nondisplaced S3 and questionable S4 fractures, orthospine was consulted in the ER for ongoing sacral pain..  Their note personally reviewed today.  They recommended a donut pillow for comfort only and nonoperative management.    === 02/18/25 ===  XR PELVIS WITH INLET AND OUTLET VIEWS  - Impression -  1. Patient's known subtle nondisplaced sacral fracture is not well  appreciated due to radiographic modality.  2. Pelvic ring is intact.  3. Lower lumbar facet arthropathy.  4. Mild bilateral hip osteoarthrosis.    She is still having the sacral pain, its a little better than it was but still difficulty with sitting, and going up the stairs. Trying to use her donut pillow. This has flared up her L4 and SIJ pain. L4-5 MJ that was done in November has probably since worn off. And her  SIJ RFA from April 2024 has since worn off. She will contact Dr. Coleman for this.     Last bone scan was in February 2023, normal.    he rates her pain as a 7/10, previously 4/10.    Otherwise, there've been no changes to her medications or past medical history since the last visit.    ___________________  4/1/2020: Continue home exercises, take meloxicam as needed, Voltaren gel as needed, follow up as needed  12/7/2020: Thoracolumbar trigger point injections, resume exercises, try diclofenac instead of meloxicam and ibuprofen, try Robaxin instead of Skelaxin  1/11/2021: Start gabapentin, continue home exercises, consider MRI, follow-up in 2 months  5/10/2021: Continue exercises, continue diclofenac and Robaxin as needed, consider other medications in the future, consider injections, avoid sitting crosslegged  8/23/2021: Continue exercises, medications as needed, consider MRIs and injections in the future  2/21/2022: Lumbar and thoracic MRI ordered, continue exercises  5/10/2022: Thoracic and lumbar MRI reviewed,, thoracic trigger point injections, referral for lumbar MJ versus facet block/RFA, continue exercises, meloxicam instead of diclofenac, continue Robaxin.  6/14/2022: Core exercises, continue medications as needed, consider repeat injections  8/29/2022: Continue medications as needed, daily home exercises focusing on active strengthening, consider repeat injections with Dr. Artis if needed  2/27/2023: Core exercises provided, continue meloxicam and Robaxin as needed, consider other medications, consider ultrasound-guided SI joint injections  6/5/2023: continue core exercises, continue meloxicam and Robaxin as needed, topamax 50 mg daily consider other medications, consider ultrasound-guided SI joint injections  10/13/2023: Left ultrasound-guided SI joint injection, bilateral thoracic trigger point injections, continue medications and exercises.  11/21/23: Continue medications, exercises, SI joint brace  "ordered, had a Licart provided, consider other SI joint options, follow-up for another injection in the meantime  12/5/2023: left US guided SIJ injection. Continue medications, exercises, SI joint brace ordered, had a Licart provided, consider other SI joint options  2/13/24: Prednisone taper, increase Topamax, restart meloxicam, continue exercises, do not wear brace for more than 2 to 3 hours, proceed with MRI, follow-up for tremor  3/19/24: same day visit, right upper back and neck trigger point injections, continue medications, proceed with repeat MRI, consider neck MRI, continue exercises, neck x-ray ordered   4/9/2024: Left upper back and neck trigger point injections, continue medications,  consider neck MRI, continue exercises, proceed with SI joint procedures with Dr. Artis   6/11/2024: Continue medications and exercises, EMG ordered for the left thigh, consider minimally invasive SI joint fusion procedures  7/24/24: follow up for LFCN with US, same as above   8/2/2024 : Ultrasound-guided lateral femoral cutaneous nerve block, continue medications and HEP, same as above  9/11/2024: Take Topamax 25 mg in the morning 100 mg at night, consider other medications, continue exercises, proceed with PT, lumbar MRI ordered, follow-up with Dr. Artis for L4 5 injection  11/27/2024: Try turmeric, left knee x-ray ordered, OMT referral provided, continue medications and exercises, do repeat injections with pain management  3/4/25: You can increase Topamax as tolerated, continue donut pillow and other medications, follow-up with Dr. Coleman repeat MJ versus SI joint RFA  _______________  As a reminder:    TIMELINE OF COMPLAINT(S):  The patient had back surgery back in 2002, she is not sure what level, may be something involving L4, and she is not sure if there was fusion. This was for \"sciatica or arthritis\", and at the time she had tingling and weakness during down her right foot. Surgery helped, and she had no issues " "until this past June 2019. There are x-rays in June 2018, but the patient is adamant that it only started in 2019. She said that she was doing well from 2002 until 2019. At some point in the middle, about 6 years ago, she was told that she has scoliosis, but she had no main issues.     When she started feeling the pain come back in June 2019, she had some x-rays done and was told that her \"bones were deteriorating\". Then a few months later in August, she fell when she missed a step as she ran down the stairs, and landed on the top of her right hand, and since then it cramps up with certain movements, like when she wipes herself. The back of her knee also started hurting in June 2019, and is worse with prolonged bending and then getting up to stand.     Also in the past month, she felt the tingling \"like warm ants tingling\" going from her lower back to her right shoulder blade. This past weekend she felt pain in both posterior shoulders and neck. The left side is fine now, but she still has a dull ache in the right posterior shoulder and neck area.    The low back is the most bothersome along with the right knee. She is also had tingling in the right foot since June .    Pain:  LOCATION- low back LSJ, L=R but alternates, L groin, bilateral posterior shoulder pain , post neck and right knee and thigh posteriorly  RADIATION- Down R upper arm to elbow. no pain below knee or elbow.  ASSOCIATED WITH- no falls  NUMBNESS/TINGLING- Yes, right foot  WEAKNESS- No  CONSTANT or INTERMITTENT- Intermittent  SEVERITY/QUANTITY- 8, sitting here now 6/10 in shoulder, but none in back  QUALITY- Dull, achy  EXACERBATED BY- Nothing  BETTER WITH- Cold or warm compress  TRIED- Naproxen doesn't help, flexeril doesn't help just sleepy. , Tylenol Arthritis takes edge off, meloxicam hasn't taken in while, medrol dose packs for respiratory issues and also helped with pain. No other meds for this    PHYSICAL THERAPY: Yes, last time was in 2002, " "does some HEP, no TENS  CHIROPRACTIC MANIPULATION: No  ACUPUNCTURE TREATMENTS: No  DEEP TISSUE MASSAGE THERAPY: Yes, helps temporarily  OSTEOPATHIC MANIPULATION THERAPY: No  INJECTIONS: No  EMG/NCS:   EMG 6/20/2024:  \"The results were essentially within normal limits. There is unobtainable peroneal motor response recording at the EDB muscle, with needle exam showing chronic denervation in the left EDB. This is a nonspecific and not necessarily abnormal finding in isolation especially in this age group. No electrical evidence of lumbar radiculopathy was seen. The patient describes clinical symptoms suggestive of left meralgia paresthetica which is not detectable on EMG/nerve conduction studies. Clinical correlation recommended. \"   neck x-ray was done on 3/19/2024 and it showed mild to moderate multilevel cervical spondylosis especially at C5-C7.    IMAGING: Yes,     LS xray 8/2018 L5-S1 disc space narrowing, no knee or neck imaging in our system.       [facility] pelvic MRI done on 4/4/2024:  IMPRESSION:  1.  Mild insertional tendinosis of bilateral gluteus medius tendons.  2. Mild bilateral trochanteric bursitis.  3. Mild bilateral hip osteoarthrosis.  4. Marked bilateral facet osteoarthropathy at L4-L5 and L5-S1 with  bilateral facet effusions at L4-L5. At least moderate grade bilateral  L4-5 osseous foraminal stenosis. Consider dedicated lumbar spine MRI  for further evaluation if clinically warranted.  5. Mild osteoarthrosis of the left sacroiliac joint.  6. Small amount of free fluid in the dependent pelvis, a nonspecific finding.      === 10/09/24 ===  MR LUMBAR SPINE WO CONTRAST  - Impression -  Multilevel degenerative changes are present similar to mildly worse the prior MR  especially at L4-5 where there was severe stenosis on the left and moderate on the right, severely arthritic facet joints bilaterally.  Mild to moderate neuroforaminal stenosis at L5/S1    left knee x-ray 12/2/2024 and it showed " chondrocalcinosis in the lateral compartment    === 02/18/25 ===  XR PELVIS WITH INLET AND OUTLET VIEWS  - Impression -  1. Patient's known subtle nondisplaced sacral fracture is not well  appreciated due to radiographic modality.  2. Pelvic ring is intact.  3. Lower lumbar facet arthropathy.  4. Mild bilateral hip osteoarthrosis.    FUNCTIONAL HISTORY: The patient is independent in all ADLs, mobility, and driving. The patient does not use any assistive device.    SOCIAL HISTORY:  Lives in: [facility]  Lives with: Spouse  Occupation: Clergy  Smoking:No  Alcohol: No  Drugs: No    ____________  ROS: The patient denies any bowel or bladder incontinence/accidents, night sweats, fevers, chills, recent significant weight loss. A 14 point review of systems was reviewed with the patient and is as above and otherwise negative. ROS questionnaire positive for back pain and joint pain      Physical Exam  GEN - Alert, well-developed, well-nourished, no apparent distress  PSYCH - Cooperative, appropriate mood and affect  HEENT - NC/AT  RESP - Non-labored respirations, equal expansion  CV - well-perfused, No cyanosis or edema in extremities.   ABD- soft, ND  SKIN - No rash.    Positive Yolette and Gaenslen test on the left, significant tenderness over the left SI joint and surrounding paraspinal and gluteal musculature.  Hip range of motion itself was okay and did not reproduce hip pain.    Previous BACK/SPINE - Symmetric posture, no erythema, edema, or swelling. Full lumbar range of motion without provocation of pain symptoms. There was tenderness over both SI joints significantly, as well as bilateral gluteal muscles. No significant tenderness over the greater trochanteric bursa areas. Mild tenderness over the lumbar paraspinal muscles.. Straight leg raise negative bilaterally. Sitting slump test negative bilaterally. Femoral stretch test negative bilaterally.     Previous HIPS/PELVIS - Symmetric in standing and lying Passive  hip flexion, internal rotation, and external rotation within functional normal limits bilaterally without provocation of pain symptoms. No tenderness over iliopsoas tendon.positive FABERs on the left, positive Gaenslen's on the right. Negative hip clicking. Negative hip impingement test. Negative log roll. Negative resisted active SLR. No pain with deep hip flexion.patient was not able to do single leg sit to stand on either side    Previous NEURO - UE strength 5/5 - including shoulder abduction, biceps, triceps, wrist extensors, finger flexors, interossei, and    LE strength 5/5 - including hip flexors, knee flexors, knee extensors, ankle dorsiflexors, ankle plantar flexors, and EHL   Sensation - intact to light touch in bilateral lower and upper extremities.   Reflexes - diminished but equal biceps, brachioradialis, triceps, patellar and Achilles reflexes bilaterally No Clonus, No Babinski, Renee's negative bilaterally  GAIT - Normal base, normal stride length, non-antalgic. Able to toe walk and heel walk without difficulty.

## 2025-03-04 ENCOUNTER — APPOINTMENT (OUTPATIENT)
Dept: PHYSICAL MEDICINE AND REHAB | Facility: CLINIC | Age: 65
End: 2025-03-04
Payer: COMMERCIAL

## 2025-03-04 VITALS
TEMPERATURE: 96.9 F | BODY MASS INDEX: 23.05 KG/M2 | SYSTOLIC BLOOD PRESSURE: 135 MMHG | HEART RATE: 75 BPM | HEIGHT: 64 IN | WEIGHT: 135 LBS | OXYGEN SATURATION: 100 % | DIASTOLIC BLOOD PRESSURE: 83 MMHG

## 2025-03-04 DIAGNOSIS — M41.9 SCOLIOSIS, UNSPECIFIED SCOLIOSIS TYPE, UNSPECIFIED SPINAL REGION: ICD-10-CM

## 2025-03-04 DIAGNOSIS — M25.561 CHRONIC PAIN OF RIGHT KNEE: ICD-10-CM

## 2025-03-04 DIAGNOSIS — M46.1 SACROILIITIS (CMS-HCC): ICD-10-CM

## 2025-03-04 DIAGNOSIS — M53.3 SACROILIAC JOINT DYSFUNCTION OF BOTH SIDES: ICD-10-CM

## 2025-03-04 DIAGNOSIS — M79.18 MYOFASCIAL PAIN: Primary | ICD-10-CM

## 2025-03-04 DIAGNOSIS — G89.29 CHRONIC PAIN OF LEFT UPPER EXTREMITY: ICD-10-CM

## 2025-03-04 DIAGNOSIS — G57.11 MERALGIA PARAESTHETICA, RIGHT: ICD-10-CM

## 2025-03-04 DIAGNOSIS — G89.29 CHRONIC THORACIC BACK PAIN, UNSPECIFIED BACK PAIN LATERALITY: ICD-10-CM

## 2025-03-04 DIAGNOSIS — G89.29 CHRONIC LOW BACK PAIN, UNSPECIFIED BACK PAIN LATERALITY, UNSPECIFIED WHETHER SCIATICA PRESENT: ICD-10-CM

## 2025-03-04 DIAGNOSIS — Z98.890 HISTORY OF LUMBAR SURGERY: ICD-10-CM

## 2025-03-04 DIAGNOSIS — M46.1 INFLAMMATION OF SACROILIAC JOINT (CMS-HCC): ICD-10-CM

## 2025-03-04 DIAGNOSIS — M54.2 NECK PAIN: ICD-10-CM

## 2025-03-04 DIAGNOSIS — G89.29 CHRONIC PAIN OF RIGHT KNEE: ICD-10-CM

## 2025-03-04 DIAGNOSIS — M54.16 LUMBAR RADICULOPATHY: ICD-10-CM

## 2025-03-04 DIAGNOSIS — M25.562 ACUTE PAIN OF LEFT KNEE: ICD-10-CM

## 2025-03-04 DIAGNOSIS — M54.42 LOW BACK PAIN WITH LEFT-SIDED SCIATICA, UNSPECIFIED BACK PAIN LATERALITY, UNSPECIFIED CHRONICITY: ICD-10-CM

## 2025-03-04 DIAGNOSIS — M17.11 PRIMARY OSTEOARTHRITIS OF RIGHT KNEE: ICD-10-CM

## 2025-03-04 DIAGNOSIS — M54.6 CHRONIC THORACIC BACK PAIN, UNSPECIFIED BACK PAIN LATERALITY: ICD-10-CM

## 2025-03-04 DIAGNOSIS — M79.602 CHRONIC PAIN OF LEFT UPPER EXTREMITY: ICD-10-CM

## 2025-03-04 DIAGNOSIS — M54.50 CHRONIC LOW BACK PAIN, UNSPECIFIED BACK PAIN LATERALITY, UNSPECIFIED WHETHER SCIATICA PRESENT: ICD-10-CM

## 2025-03-04 PROCEDURE — G2211 COMPLEX E/M VISIT ADD ON: HCPCS | Performed by: PHYSICAL MEDICINE & REHABILITATION

## 2025-03-04 PROCEDURE — 3008F BODY MASS INDEX DOCD: CPT | Performed by: PHYSICAL MEDICINE & REHABILITATION

## 2025-03-04 PROCEDURE — 99214 OFFICE O/P EST MOD 30 MIN: CPT | Performed by: PHYSICAL MEDICINE & REHABILITATION

## 2025-03-04 PROCEDURE — 1160F RVW MEDS BY RX/DR IN RCRD: CPT | Performed by: PHYSICAL MEDICINE & REHABILITATION

## 2025-03-04 PROCEDURE — 1159F MED LIST DOCD IN RCRD: CPT | Performed by: PHYSICAL MEDICINE & REHABILITATION

## 2025-03-04 PROCEDURE — 1125F AMNT PAIN NOTED PAIN PRSNT: CPT | Performed by: PHYSICAL MEDICINE & REHABILITATION

## 2025-03-04 ASSESSMENT — PAIN SCALES - GENERAL: PAINLEVEL_OUTOF10: 7

## 2025-03-04 NOTE — LETTER
March 4, 2025     No Recipients    Patient: Lilli Aldridge   YOB: 1960   Date of Visit: 3/4/2025       Dear Dr. Shah Recipients:    Thank you for referring Lilli Aldridge to me for evaluation. Below are my notes for this consultation.  If you have questions, please do not hesitate to call me. I look forward to following your patient along with you.       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 Physical exam is reassuring for lack of neurological compromise. Symptoms and physical exam findings in this complex patient and her of unclear etiology at the moment, but most likely a result of sacroiliac joint dysfunction and reactive myofascial pain/tension, possibly stemming from right knee osteoarthritis. However, lumbar radiculopathy and spondylosis are on the differential, as well as referral from ovarian distention into the posterior right knee, given her history of cervical cancer in the past.    HAPPY BIRTHDAY!    -Continue Topamax 100 mg at night but try 50 mg in the morning if needed  -Continue Robaxin and meloxicam as needed   -Consider other medications in the future including Lyrica, Cymbalta, nortriptyline  -Consider left knee injection  in the future if needed  -Continue physical therapy daily home exercises  -Do not wear SIJ brace for more than 2 to 3 hours/day.  -consider cornerloc or transloc procedures with Dr. Artis versus iFuse procedure (with Dr. Matute).    -Follow-up with Dr. Coleman to discuss repeat left L4-5 and L5-S1 injection back to back  -OMT referral provided previously: Dr. Reji Hauser [phone], or [facility]  [phone] or Dr. Scott in [place] [phone]  -Follow-up 3 months or sooner if needed    The patient expressed understanding and agreement with the assessment and plan. Patient encouraged to contact us should they have any questions, concerns, or any changes in symptoms.     Thank you for allowing me to participate in the care of your patient.      ** Dictated with voice recognition software, please forgive any errors in grammar and/or spelling **      Chief Complaint    Follow up on back, bilateral shoulder and right knee pain      History of Present Illness    This is a pleasant 65-year-old right-handed woman with past medical history significant for BPPV, HLD, cervical carcinoma in situ 2012 s/p hysterectomy, who presents for follow-up chronic back, shoulder, and right knee pain.    She was last seen here on 11/27/2024, at which point I advised her to continue Topamax 100 mg at night but try 25 mg in the morning.    I also advised her to try tumeric and to continue Robaxin and meloxicam as needed. Tumeric helped but she was having stomach upset. So she stopped taking it 2 days ago.     I advised her to continue her physical therapy and home exercises.    Advised her to follow-up with Dr. Coleman to discuss repeat left L4-5 and L5-S1 injections back-to-back as the first ones it helped especially in the thigh.    I referred her to OMT.    I ordered a left knee x-ray and this was done on 12/2/2024 and it showed chondrocalcinosis in the lateral compartment    She was in the ER on 2/18/2025 after a fall, when she slipped on ice and fell backward and hit her head 5 days prior.  Imaging was ordered including her hips.,  Possible nondisplaced S3 and questionable S4 fractures, orthospine was consulted in the ER for ongoing sacral pain..  Their note personally reviewed today.  They recommended a donut pillow for comfort only and nonoperative management.    === 02/18/25 ===  XR PELVIS WITH INLET AND OUTLET  VIEWS  - Impression -  1. Patient's known subtle nondisplaced sacral fracture is not well  appreciated due to radiographic modality.  2. Pelvic ring is intact.  3. Lower lumbar facet arthropathy.  4. Mild bilateral hip osteoarthrosis.    She is still having the sacral pain, its a little better than it was but still difficulty with sitting, and going up the stairs. Trying to use her donut pillow. This has flared up her L4 and SIJ pain. L4-5 MJ that was done in November has probably since worn off. And her SIJ RFA from April 2024 has since worn off. She will contact Dr. Coleman for this.     Last bone scan was in February 2023, normal.    he rates her pain as a 7/10, previously 4/10.    Otherwise, there've been no changes to her medications or past medical history since the last visit.    ___________________  4/1/2020: Continue home exercises, take meloxicam as needed, Voltaren gel as needed, follow up as needed  12/7/2020: Thoracolumbar trigger point injections, resume exercises, try diclofenac instead of meloxicam and ibuprofen, try Robaxin instead of Skelaxin  1/11/2021: Start gabapentin, continue home exercises, consider MRI, follow-up in 2 months  5/10/2021: Continue exercises, continue diclofenac and Robaxin as needed, consider other medications in the future, consider injections, avoid sitting crosslegged  8/23/2021: Continue exercises, medications as needed, consider MRIs and injections in the future  2/21/2022: Lumbar and thoracic MRI ordered, continue exercises  5/10/2022: Thoracic and lumbar MRI reviewed,, thoracic trigger point injections, referral for lumbar MJ versus facet block/RFA, continue exercises, meloxicam instead of diclofenac, continue Robaxin.  6/14/2022: Core exercises, continue medications as needed, consider repeat injections  8/29/2022: Continue medications as needed, daily home exercises focusing on active strengthening, consider repeat injections with Dr. Artis if needed  2/27/2023:  Core exercises provided, continue meloxicam and Robaxin as needed, consider other medications, consider ultrasound-guided SI joint injections  6/5/2023: continue core exercises, continue meloxicam and Robaxin as needed, topamax 50 mg daily consider other medications, consider ultrasound-guided SI joint injections  10/13/2023: Left ultrasound-guided SI joint injection, bilateral thoracic trigger point injections, continue medications and exercises.  11/21/23: Continue medications, exercises, SI joint brace ordered, had a Licart provided, consider other SI joint options, follow-up for another injection in the meantime  12/5/2023: left US guided SIJ injection. Continue medications, exercises, SI joint brace ordered, had a Licart provided, consider other SI joint options  2/13/24: Prednisone taper, increase Topamax, restart meloxicam, continue exercises, do not wear brace for more than 2 to 3 hours, proceed with MRI, follow-up for tremor  3/19/24: same day visit, right upper back and neck trigger point injections, continue medications, proceed with repeat MRI, consider neck MRI, continue exercises, neck x-ray ordered   4/9/2024: Left upper back and neck trigger point injections, continue medications,  consider neck MRI, continue exercises, proceed with SI joint procedures with Dr. Artis   6/11/2024: Continue medications and exercises, EMG ordered for the left thigh, consider minimally invasive SI joint fusion procedures  7/24/24: follow up for LFCN with US, same as above   8/2/2024 : Ultrasound-guided lateral femoral cutaneous nerve block, continue medications and HEP, same as above  9/11/2024: Take Topamax 25 mg in the morning 100 mg at night, consider other medications, continue exercises, proceed with PT, lumbar MRI ordered, follow-up with Dr. Artis for L4 5 injection  11/27/2024: Try turmeric, left knee x-ray ordered, OMT referral provided, continue medications and exercises, do repeat injections with pain  "management  3/4/25: You can increase Topamax as tolerated, continue donut pillow and other medications, follow-up with Dr. Coleman repeat MJ versus SI joint RFA  _______________  As a reminder:    TIMELINE OF COMPLAINT(S):  The patient had back surgery back in 2002, she is not sure what level, may be something involving L4, and she is not sure if there was fusion. This was for \"sciatica or arthritis\", and at the time she had tingling and weakness during down her right foot. Surgery helped, and she had no issues until this past June 2019. There are x-rays in June 2018, but the patient is adamant that it only started in 2019. She said that she was doing well from 2002 until 2019. At some point in the middle, about 6 years ago, she was told that she has scoliosis, but she had no main issues.     When she started feeling the pain come back in June 2019, she had some x-rays done and was told that her \"bones were deteriorating\". Then a few months later in August, she fell when she missed a step as she ran down the stairs, and landed on the top of her right hand, and since then it cramps up with certain movements, like when she wipes herself. The back of her knee also started hurting in June 2019, and is worse with prolonged bending and then getting up to stand.     Also in the past month, she felt the tingling \"like warm ants tingling\" going from her lower back to her right shoulder blade. This past weekend she felt pain in both posterior shoulders and neck. The left side is fine now, but she still has a dull ache in the right posterior shoulder and neck area.    The low back is the most bothersome along with the right knee. She is also had tingling in the right foot since June .    Pain:  LOCATION- low back LSJ, L=R but alternates, L groin, bilateral posterior shoulder pain , post neck and right knee and thigh posteriorly  RADIATION- Down R upper arm to elbow. no pain below knee or elbow.  ASSOCIATED WITH- no " "falls  NUMBNESS/TINGLING- Yes, right foot  WEAKNESS- No  CONSTANT or INTERMITTENT- Intermittent  SEVERITY/QUANTITY- 8, sitting here now 6/10 in shoulder, but none in back  QUALITY- Dull, achy  EXACERBATED BY- Nothing  BETTER WITH- Cold or warm compress  TRIED- Naproxen doesn't help, flexeril doesn't help just sleepy. , Tylenol Arthritis takes edge off, meloxicam hasn't taken in while, medrol dose packs for respiratory issues and also helped with pain. No other meds for this    PHYSICAL THERAPY: Yes, last time was in 2002, does some HEP, no TENS  CHIROPRACTIC MANIPULATION: No  ACUPUNCTURE TREATMENTS: No  DEEP TISSUE MASSAGE THERAPY: Yes, helps temporarily  OSTEOPATHIC MANIPULATION THERAPY: No  INJECTIONS: No  EMG/NCS:   EMG 6/20/2024:  \"The results were essentially within normal limits. There is unobtainable peroneal motor response recording at the EDB muscle, with needle exam showing chronic denervation in the left EDB. This is a nonspecific and not necessarily abnormal finding in isolation especially in this age group. No electrical evidence of lumbar radiculopathy was seen. The patient describes clinical symptoms suggestive of left meralgia paresthetica which is not detectable on EMG/nerve conduction studies. Clinical correlation recommended. \"   neck x-ray was done on 3/19/2024 and it showed mild to moderate multilevel cervical spondylosis especially at C5-C7.    IMAGING: Yes,     LS xray 8/2018 L5-S1 disc space narrowing, no knee or neck imaging in our system.       [facility] done on 4/4/2024:  IMPRESSION:  1.  Mild insertional tendinosis of bilateral gluteus medius tendons.  2. Mild bilateral trochanteric bursitis.  3. Mild bilateral hip osteoarthrosis.  4. Marked bilateral facet osteoarthropathy at L4-L5 and L5-S1 with  bilateral facet effusions at L4-L5. At least moderate grade bilateral  L4-5 osseous foraminal stenosis. Consider dedicated lumbar spine MRI  for further evaluation if clinically " warranted.  5. Mild osteoarthrosis of the left sacroiliac joint.  6. Small amount of free fluid in the dependent pelvis, a nonspecific finding.      === 10/09/24 ===  MR LUMBAR SPINE WO CONTRAST  - Impression -  Multilevel degenerative changes are present similar to mildly worse the prior MR  especially at L4-5 where there was severe stenosis on the left and moderate on the right, severely arthritic facet joints bilaterally.  Mild to moderate neuroforaminal stenosis at L5/S1    left knee x-ray 12/2/2024 and it showed chondrocalcinosis in the lateral compartment    === 02/18/25 ===  XR PELVIS WITH INLET AND OUTLET VIEWS  - Impression -  1. Patient's known subtle nondisplaced sacral fracture is not well  appreciated due to radiographic modality.  2. Pelvic ring is intact.  3. Lower lumbar facet arthropathy.  4. Mild bilateral hip osteoarthrosis.    FUNCTIONAL HISTORY: The patient is independent in all ADLs, mobility, and driving. The patient does not use any assistive device.    SOCIAL HISTORY:  L[facility] in: [facility] [place]  Lives with: Spouse  Occupation: Clergy  Smoking:No  Alcohol: No  Drugs: No    ____________  ROS: The patient denies any bowel or bladder incontinence/accidents, night sweats, fevers, chills, recent significant weight loss. A 14 point review of systems was reviewed with the patient and is as above and otherwise negative. ROS questionnaire positive for back pain and joint pain      Physical Exam  GEN - Alert, well-developed, well-nourished, no apparent distress  PSYCH - Cooperative, appropriate mood and affect  HEENT - NC/AT  RESP - Non-labored respirations, equal expansion  CV - well-perfused, No cyanosis or edema in extremities.   ABD- soft, ND  SKIN - No rash.    Positive Yolette and Gaenslen test on the left, significant tenderness over the left SI joint and surrounding paraspinal and gluteal musculature.  Hip range of motion itself was okay and did not reproduce hip pain.    Previous BACK/SPINE -  Symmetric posture, no erythema, edema, or swelling. Full lumbar range of motion without provocation of pain symptoms. There was tenderness over both SI joints significantly, as well as bilateral gluteal muscles. No significant tenderness over the greater trochanteric bursa areas. Mild tenderness over the lumbar paraspinal muscles.. Straight leg raise negative bilaterally. Sitting slump test negative bilaterally. Femoral stretch test negative bilaterally.     Previous HIPS/PELVIS - Symmetric in standing and lying Passive hip flexion, internal rotation, and external rotation within functional normal limits bilaterally without provocation of pain symptoms. No tenderness over iliopsoas tendon.positive FABERs on the left, positive Gaenslen's on the right. Negative hip clicking. Negative hip impingement test. Negative log roll. Negative resisted active SLR. No pain with deep hip flexion.patient was not able to do single leg sit to stand on either side    Previous NEURO - UE strength 5/5 - including shoulder abduction, biceps, triceps, wrist extensors, finger flexors, interossei, and    LE strength 5/5 - including hip flexors, knee flexors, knee extensors, ankle dorsiflexors, ankle plantar flexors, and EHL   Sensation - intact to light touch in bilateral lower and upper extremities.   Reflexes - diminished but equal biceps, brachioradialis, triceps, patellar and Achilles reflexes bilaterally No Clonus, No Babinski, Renee's negative bilaterally  GAIT - Normal base, normal stride length, non-antalgic. Able to toe walk and heel walk without difficulty.

## 2025-03-06 ENCOUNTER — APPOINTMENT (OUTPATIENT)
Dept: PAIN MEDICINE | Facility: CLINIC | Age: 65
End: 2025-03-06
Payer: COMMERCIAL

## 2025-03-06 ENCOUNTER — OFFICE VISIT (OUTPATIENT)
Dept: PAIN MEDICINE | Facility: CLINIC | Age: 65
End: 2025-03-06
Payer: COMMERCIAL

## 2025-03-06 DIAGNOSIS — M54.16 LUMBAR RADICULOPATHY: ICD-10-CM

## 2025-03-06 DIAGNOSIS — M46.1 SACROILIITIS (CMS-HCC): Primary | ICD-10-CM

## 2025-03-06 PROCEDURE — 99214 OFFICE O/P EST MOD 30 MIN: CPT | Performed by: ANESTHESIOLOGY

## 2025-03-06 NOTE — PROGRESS NOTES
Subjective   Patient ID: Lilli Aldridge is a 65 y.o. female with a past medical history of  BPPV, HLD, cervical carcinoma in situ 2012 s/p hysterectomy, presenting for follow up of left lower extremity radicular pain.     Patient had a left transforaminal epidural steroid injection in November 2024 with 60% relief for a few months. She also had SI joint RFA in April 2024 with 80% relief last for 6-7 months. Patient reports her pain has come back in the last month.  Describes pain as sharp and aching at her left buttocks and and radiates down the lateral aspect of the left thigh.  Pain can be anywhere from a 4-9 out of 10 in severity depending on the day.  Pain is worse with transitional movements and bending and prolonged sitting.  Of note patient sustained a sacral fracture after a fall last month.  Patient has been managing this conservatively with using a cushion when sitting and icing heat.  Patient taking meloxicam as needed for pain. Denies weakness, saddle anesthesia, bowel or bladder incontinence.     Recall HPI:   Lilli Aldridge is a 64 y.o. 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 Patient is s/p bilateral sacroiliac joint radiofrequency ablation on April 19, 2024.  Patient had previously undergone a left-sided L3 and L4 transforaminal epidural steroid injection on December 26, 2023.  Patient says that her pain tod.  Ay is mostly located to the left lower extremity and that it courses down the anterior aspect of her left lower extremity.  Patient describes her pain is about 6-7 out of 10 in intensity and dull in nature.  Patient says she takes Topamax 125 mg daily as well as meloxicam and acetaminophen as needed for additional pain relief. Patient says she also utilizes heating and icing for additional relief.    Physical Therapy: The patient has done six or more weeks of physical  therapy in the past six months with minimal improvement  Other Conservative Measures she has tried: Heating Pad, Ice, and Injections  Classes of medications tried in the past: Acetaminophen, NSAIDs, and Antiepileptic agents        Review of Systems   13-point ROS done and negative except for HPI.     Current Outpatient Medications   Medication Instructions    albuterol 90 mcg/actuation inhaler 2 puffs, Every 6 hours PRN    albuterol 90 mcg/actuation inhaler 2 puffs, inhalation, Every 6 hours PRN    calcium carbonate-vitamin D3 1,000 mg-20 mcg (800 unit) tablet Calcium 1000 + D 1000-800 MG-UNIT Oral Tablet Take 1 tablet daily Quantity: 0 Refills: 0 Ordered: 2-Sep-2021 Miracle Velasco MD Start : 2-Sep-2021 Active    chlorpheniramine (Chlor-Trimeton) 4 mg tablet 1 tablet    flunisolide (Nasalide) 25 mcg (0.025 %) spray,non-aerosol 2 sprays, 2 times daily    loratadine (Claritin) 10 mg tablet 1 tablet, Daily    meloxicam (MOBIC) 7.5-15 mg, oral, Daily, TAKE 1 TO 2 TABLETS(7.5 TO 15 MG) BY MOUTH DAILY    methocarbamol (ROBAXIN) 500 mg, oral, 3 times daily    topiramate (TOPAMAX) 100 mg, oral, Nightly    topiramate (TOPAMAX) 25 mg, oral, Daily       Past Medical History:   Diagnosis Date    Cervical high risk human papillomavirus (HPV) DNA test positive     Cervical high risk HPV (human papillomavirus) test positive    Cough variant asthma ([facility]) 11/25/2017    Cough variant asthma    Lateral epicondylitis, unspecified elbow     Lateral epicondylitis (tennis elbow)    Personal history of diseases of the blood and blood-forming organs and certain disorders involving the immune mechanism     History of neutropenia    Personal history of other diseases of the musculoskeletal system and connective tissue     History of scoliosis    Urge incontinence 09/16/2022    Urge incontinence    Urinary tract infection, site not specified 09/15/2022    Recurrent UTI        Past Surgical History:   Procedure Laterality Date    BACK SURGERY   07/06/2016    Back Surgery    HYSTERECTOMY  07/06/2016    Hysterectomy    MOUTH SURGERY  11/09/2017    Oral Surgery Tooth Extraction    OTHER SURGICAL HISTORY  11/09/2017    Cervical Conization By Cold Knife    TONSILLECTOMY  11/09/2017    Tonsillectomy    TUBAL LIGATION  07/06/2016    Tubal Ligation        Family History   Problem Relation Name Age of Onset    Other (Diabetes mellitus) Mother      Hypertension Mother      Cataracts Mother      Glaucoma Mother      Other (Diabetes mellitus) Father      Hypertension Father      Liver cancer Father      Cataracts Father      Glaucoma Father      Decreased libido Sister          Allergies   Allergen Reactions    Penicillins Hives and Unknown     Nausea    Cat Dander Unknown    Dog Dander Unknown    Erythromycin Other, Nausea And Vomiting, Nausea Only and Nausea/vomiting    Statins-Hmg-Coa Reductase Inhibitors Other     Muscle tightness and pain        Objective     There were no vitals filed for this visit.     Physical Exam  General: NAD, well groomed, well nourished  Eyes: Non-icteric sclera, EOMI  Ears, Nose, Mouth, and Throat: External ears and nose appear to be without deformity or rash. No lesions or masses noted. Hearing is grossly intact.   Neck: Trachea midline  Respiratory: Nonlabored breathing   Cardiovascular: No significant peripheral edema noted   Skin: No rashes or open lesions/ulcers identified on skin.  Back:  Palpation: tenderness to palpation over left lumbar paraspinous muscles, left gluteal line. PSIS and greater trochanter nontender bilaterally.     Neurologic:   Cranial nerves grossly intact.   Strength 5/5 and symmetric plantar/dorsiflexion   Sensation: Normal to light touch throughout  DTRs: normal and symmetric throughout    Psychiatric: Alert, orientation to person, place, and time. Cooperative.    Imaging personally reviewed and independently interpreted Lumbar MRI shows multilevel degeneration with facet edema and bilateral foraminal  stenosis worst at left L4-L5 and L5-S1 with near complete loss of L5-S1 disc height.    Assessment/Plan   Lilli Aldridge is a 64 y.o. female with a past medical history of  BPPV, HLD, cervical carcinoma in situ 2012 s/p hysterectomy, presenting for follow up of left lower extremity radicular pain. Her history, exam, and imaging are consistent with a left L4-L5 and L5-S1 radiculopathy. Of note patient had a sacral fracture after a fall last month which has been managed conservatively with cushion with sitting, ice, and medications. Discussed plan of repeating left lumbar transforaminal epidural steroid injection.     Plan:  - continue PT and home exercises  - continue topamax and meloxicam  - Left L4-L5 and L5-S1 TFESI under fluoroscopy  - repeat SI joint RFA given patient had 80% relief lasting for 6-7 months  - continue conservative management of sacral fracture    Medical Necessity  The patient has failed conservative treatment including different classes of medications and physical therapy.  Patient continues to rate their pain as moderate to severe, affecting quality of sleep, quality of life and  significantly impairing daily activities (ADLs)   We discussed  the risks, benefits and alternatives of the procedure including but not limited to: Lack of efficacy Transiently worsening pain , Bleeding, Infection , and Nerve Damage and patient is amicable to the plan    Follow up: after injection    The patient was invited to contact us back anytime with any questions or concerns and follow-up with us in the office as needed.     There are no diagnoses linked to this encounter.    Patient seen and discussed with Dr. Chandra Pelayo MD  Pain Fellow

## 2025-03-11 ENCOUNTER — HOSPITAL ENCOUNTER (OUTPATIENT)
Dept: RADIOLOGY | Facility: CLINIC | Age: 65
Discharge: HOME | End: 2025-03-11
Payer: MEDICARE

## 2025-03-11 VITALS — WEIGHT: 134.92 LBS | HEIGHT: 64 IN | BODY MASS INDEX: 23.03 KG/M2

## 2025-03-11 DIAGNOSIS — Z12.31 ENCOUNTER FOR SCREENING MAMMOGRAM FOR MALIGNANT NEOPLASM OF BREAST: ICD-10-CM

## 2025-03-11 PROCEDURE — 77067 SCR MAMMO BI INCL CAD: CPT

## 2025-03-11 PROCEDURE — 77063 BREAST TOMOSYNTHESIS BI: CPT | Performed by: RADIOLOGY

## 2025-03-11 PROCEDURE — 77067 SCR MAMMO BI INCL CAD: CPT | Performed by: RADIOLOGY

## 2025-03-17 ENCOUNTER — APPOINTMENT (OUTPATIENT)
Dept: ORTHOPEDIC SURGERY | Facility: HOSPITAL | Age: 65
End: 2025-03-17
Payer: MEDICARE

## 2025-03-17 DIAGNOSIS — M53.3 PAIN IN SACRUM: ICD-10-CM

## 2025-03-25 NOTE — H&P
HISTORY AND PHYSICAL UPDATE FOR PROCEDURE    History Of Present Illness  Lilli Aldridge is a 65 y.o. female presenting with back and left leg pain d/t foraminal stenosis.  Here for Lumbar transforaminal epidural steroid injection(s) on the left, likely L4    This note is intended to be an update to the H&P from the last office consult note. Please refer to the last pain management consult note for full H&P.    she denies any recent antibiotic use or infections, she denies any blood thinner use , and she denies contrast or local anesthetic allergies     Pre-sedation evaluation:  ASA Classification (bolded):   ASA I: Healthy patient, non-smoking, no none or minimal alcohol use  ASA II: Patient with mild systemic disease, without substantiative functional limitations.  Current smoker, social alcohol drinker, pregnancy, obesity (BMI 30-40)DM/HTN,, well-controlled mild lung disease  ASA III: Patient with severe systemic disease; substantiative of functional limitation; One or more moderate to severe diseases: Poorly controlled DM/HTN, COPD, morbid obesity (BMI>40), active hepatitis, alcohol abuse/dependence, implanted pacemaker, moderate reduction of ejection fraction, ESRD on dialysis, history (>3months) of MI, CVA, TIA or CAD/stents  ASA IV: Patient with severe systemic disease that is a constant threat to life; recent (<3 months) MI, CVA, TIA or CAD/stents, ongoing cardiac ischemia or severe valvular dysfunction, severely reduced ejection fraction, shock, sepsis, DIC, ESRD not undergoing regular scheduled dialysis    Mallampati score (bolded):   Class I: Complete visualization of the soft palate  Class II: Complete visualization of the uvula  Class III: Visualization of only the base of the uvula  Class IV: Soft palate is not visible at all    Past Medical History  Past Medical History:   Diagnosis Date    Cervical high risk human papillomavirus (HPV) DNA test positive     Cervical high risk HPV (human papillomavirus)  test positive    Cough variant asthma (HHS-HCC) 11/25/2017    Cough variant asthma    Lateral epicondylitis, unspecified elbow     Lateral epicondylitis (tennis elbow)    Personal history of diseases of the blood and blood-forming organs and certain disorders involving the immune mechanism     History of neutropenia    Personal history of other diseases of the musculoskeletal system and connective tissue     History of scoliosis    Urge incontinence 09/16/2022    Urge incontinence    Urinary tract infection, site not specified 09/15/2022    Recurrent UTI       Surgical History  Past Surgical History:   Procedure Laterality Date    BACK SURGERY  07/06/2016    Back Surgery    HYSTERECTOMY  07/06/2016    Hysterectomy    MOUTH SURGERY  11/09/2017    Oral Surgery Tooth Extraction    OTHER SURGICAL HISTORY  11/09/2017    Cervical Conization By Cold Knife    TONSILLECTOMY  11/09/2017    Tonsillectomy    TUBAL LIGATION  07/06/2016    Tubal Ligation        Social History  She reports that she has never smoked. She has never been exposed to tobacco smoke. She has never used smokeless tobacco. She reports that she does not drink alcohol and does not use drugs.    Family History  Family History   Problem Relation Name Age of Onset    Other (Diabetes mellitus) Mother      Hypertension Mother      Cataracts Mother      Glaucoma Mother      Other (Diabetes mellitus) Father      Hypertension Father      Liver cancer Father      Cataracts Father      Glaucoma Father      Decreased libido Sister          Allergies  Penicillins, Cat dander, Dog dander, Erythromycin, and Statins-hmg-coa reductase inhibitors    Review of Systems   12 point ROS done and negative except for the above.   Physical Exam     General: NAD, well groomed, well nourished  Eyes: Non-icteric sclera, EOMI  Ears, Nose, Mouth, and Throat: External ears and nose appear to be without deformity or rash. No lesions or masses noted. Hearing is grossly intact.   Neck:  "Trachea midline  Respiratory: Nonlabored breathing   Cardiovascular: No peripheral edema   Skin: No rashes or open lesions/ulcers identified on skin.    Last Recorded Vitals  There were no vitals taken for this visit.    Relevant Results  Current Outpatient Medications   Medication Instructions    albuterol 90 mcg/actuation inhaler 2 puffs, Every 6 hours PRN    albuterol 90 mcg/actuation inhaler 2 puffs, inhalation, Every 6 hours PRN    calcium carbonate-vitamin D3 1,000 mg-20 mcg (800 unit) tablet Calcium 1000 + D 1000-800 MG-UNIT Oral Tablet Take 1 tablet daily Quantity: 0 Refills: 0 Ordered: 2-Sep-2021 Miracle Velasco MD Start : 2-Sep-2021 Active    chlorpheniramine (Chlor-Trimeton) 4 mg tablet 1 tablet    flunisolide (Nasalide) 25 mcg (0.025 %) spray,non-aerosol 2 sprays, 2 times daily    loratadine (Claritin) 10 mg tablet 1 tablet, Daily    methocarbamol (ROBAXIN) 500 mg, oral, 3 times daily    topiramate (TOPAMAX) 100 mg, oral, Nightly    topiramate (TOPAMAX) 25 mg, oral, Daily      Lab Results   Component Value Date    WBC 7.0 01/06/2025    HGB 14.0 01/06/2025    HCT 44.2 01/06/2025    MCV 88 01/06/2025     01/06/2025      No results found for: \"INR\", \"PROTIME\"  No results found for: \"PTT\"  Lab Results   Component Value Date    GLUCOSE 88 01/06/2025    CALCIUM 9.5 01/06/2025     01/06/2025    K 3.9 01/06/2025    CO2 27 01/06/2025     (H) 01/06/2025    BUN 22 01/06/2025    CREATININE 1.02 01/06/2025       === 10/09/24 ===    MR LUMBAR SPINE WO CONTRAST    - Impression -  Multilevel degenerative changes are present similar to mildly worse  the prior MR    I personally reviewed the images/study and I agree with the findings  as stated. This study was interpreted at [place], [place].    MACRO:  None    Signed by: Rc Ramos 10/11/2024 2:23 PM  Dictation workstation:   [id number]       Assessment/Plan   Lilli Aldridge is a 65 y.o. F who presents for " LEFT Lumbar transforaminal epidural steroid injection.     Risks, benefits, alternatives discussed. All questions answered to the best of my ability. Patient agrees to proceed. Consent signed and patient marked appropriately.    -We will proceed with planned procedure  -Discussed with the patient that once appropriate from a recovery standpoint, they should continue physical therapy exercises at least 2-3 times per week for best long term outcomes  - discussed with the patient that if they take blood thinners, they may resume them in 24hrs        Pedro Phelan MD  Interventional Pain Fellow, PGY-5  [facility]

## 2025-03-27 ENCOUNTER — HOSPITAL ENCOUNTER (OUTPATIENT)
Facility: HOSPITAL | Age: 65
Discharge: HOME | End: 2025-03-27
Payer: MEDICARE

## 2025-03-27 VITALS
BODY MASS INDEX: 21.69 KG/M2 | DIASTOLIC BLOOD PRESSURE: 82 MMHG | HEART RATE: 61 BPM | WEIGHT: 135 LBS | HEIGHT: 66 IN | RESPIRATION RATE: 16 BRPM | TEMPERATURE: 97.7 F | OXYGEN SATURATION: 100 % | SYSTOLIC BLOOD PRESSURE: 145 MMHG

## 2025-03-27 DIAGNOSIS — M54.16 LUMBAR RADICULOPATHY: ICD-10-CM

## 2025-03-27 PROCEDURE — 64483 NJX AA&/STRD TFRM EPI L/S 1: CPT | Performed by: PAIN MEDICINE

## 2025-03-27 PROCEDURE — 64484 NJX AA&/STRD TFRM EPI L/S EA: CPT | Performed by: PAIN MEDICINE

## 2025-03-27 PROCEDURE — 99152 MOD SED SAME PHYS/QHP 5/>YRS: CPT | Performed by: PAIN MEDICINE

## 2025-03-27 PROCEDURE — 2550000001 HC RX 255 CONTRASTS: Performed by: PAIN MEDICINE

## 2025-03-27 PROCEDURE — 2500000004 HC RX 250 GENERAL PHARMACY W/ HCPCS (ALT 636 FOR OP/ED): Performed by: PAIN MEDICINE

## 2025-03-27 RX ORDER — MIDAZOLAM HYDROCHLORIDE 1 MG/ML
INJECTION, SOLUTION INTRAMUSCULAR; INTRAVENOUS
Status: COMPLETED | OUTPATIENT
Start: 2025-03-27 | End: 2025-03-27

## 2025-03-27 RX ORDER — FENTANYL CITRATE 50 UG/ML
INJECTION, SOLUTION INTRAMUSCULAR; INTRAVENOUS
Status: COMPLETED | OUTPATIENT
Start: 2025-03-27 | End: 2025-03-27

## 2025-03-27 RX ORDER — LIDOCAINE HYDROCHLORIDE 5 MG/ML
INJECTION, SOLUTION INFILTRATION; INTRAVENOUS
Status: COMPLETED | OUTPATIENT
Start: 2025-03-27 | End: 2025-03-27

## 2025-03-27 RX ORDER — DEXAMETHASONE SODIUM PHOSPHATE 10 MG/ML
INJECTION INTRAMUSCULAR; INTRAVENOUS
Status: COMPLETED | OUTPATIENT
Start: 2025-03-27 | End: 2025-03-27

## 2025-03-27 RX ADMIN — LIDOCAINE HYDROCHLORIDE 3 ML: 5 INJECTION, SOLUTION INFILTRATION at 10:03

## 2025-03-27 RX ADMIN — MIDAZOLAM 1 MG: 1 INJECTION INTRAMUSCULAR; INTRAVENOUS at 09:54

## 2025-03-27 RX ADMIN — DEXAMETHASONE SODIUM PHOSPHATE 10 MG: 10 INJECTION, SOLUTION INTRAMUSCULAR; INTRAVENOUS at 10:03

## 2025-03-27 RX ADMIN — FENTANYL CITRATE 50 MCG: 50 INJECTION INTRAMUSCULAR; INTRAVENOUS at 09:54

## 2025-03-27 RX ADMIN — IOHEXOL 2 ML: 240 INJECTION, SOLUTION INTRATHECAL; INTRAVASCULAR; INTRAVENOUS; ORAL at 10:03

## 2025-03-27 ASSESSMENT — PAIN - FUNCTIONAL ASSESSMENT
PAIN_FUNCTIONAL_ASSESSMENT: 0-10
PAIN_FUNCTIONAL_ASSESSMENT: WONG-BAKER FACES
PAIN_FUNCTIONAL_ASSESSMENT: 0-10
PAIN_FUNCTIONAL_ASSESSMENT: WONG-BAKER FACES

## 2025-03-27 ASSESSMENT — PAIN SCALES - GENERAL
PAINLEVEL_OUTOF10: 5 - MODERATE PAIN
PAINLEVEL_OUTOF10: 1
PAINLEVEL_OUTOF10: 0 - NO PAIN
PAINLEVEL_OUTOF10: 6

## 2025-03-27 ASSESSMENT — PAIN DESCRIPTION - DESCRIPTORS: DESCRIPTORS: THROBBING

## 2025-03-27 NOTE — DISCHARGE INSTRUCTIONS
DISCHARGE INSTRUCTIONS FOR INJECTIONS     You underwent LEFT Lumbar transforaminal epidural steroid injection today    After most injections, it is recommended that you relax and limit your activity for the remainder of the day unless you have been told otherwise by your pain physician.  You should not drive a car, operate machinery, or make important legal decisions unless otherwise directed by your pain physician.  You may resume your normal activity, including exercise, tomorrow.      Keep a written pain diary of how much pain relief you experienced following the injection procedure and the length of time of pain relief you experienced pain relief. Following diagnostic injections like medial branch nerve blocks, sacroiliac joint blocks, stellate ganglion injections and other blocks, it is very important you record the specific amount of pain relief you experienced immediately after the injectionand how long it lasted. Your doctor will ask you for this information at your follow up visit.     For all injections, please keep the injection site dry and inspect the site for a couple of days. You may remove the Band-Aid the day of the injection at any time.     Some discomfort, bruising or slight swelling may occur at the injection site. This is not abnormal if it occurs.  If needed you may:    -Take over the counter medication such as Tylenol or Motrin.   -Apply an ice pack for 30 minutes, 2 to 3 times a day for the first 24 hours.     You may shower today; no soaking baths, hot tubs, whirlpools or swimming pools for two days.      If you are given steroids in your injection, it may take 3-5 days for the steroid medication to take effect. You may notice a worsening of your symptoms for 1-2 days after the injection. This is not abnormal.  You may use acetaminophen, ibuprofen, or prescription medication that your doctor may have prescribed for you if you need to do so.     A few common side effects of steroids include  facial flushing, sweating, restlessness, irritability,difficulty sleeping, increase in blood sugar, and increased blood pressure. If you have diabetes, please monitor your blood sugar at least once a day for at least 5 days. If you have poorly controlled high blood pressure, monitoryour blood pressure for at least 2 days and contact your primary care physician if these numbers are unusually high for you.      If you take aspirin or non-steroidal anti-inflammatory drugs (examples are Motrin, Advil, ibuprofen, Naprosyn, Voltaren, Relafen, etc.) you may restart these this evening, but stop taking it 3 days before your next appointment, unless instructed otherwiseby your physician.      You do not need to discontinue non-aspirin-containing pain medications prior to an injection (examples: Celebrex, tramadol, hydrocodone and acetaminophen).      If you take a blood thinning medication (Coumadin, Lovenox, Fragmin,Ticlid, Plavix, Pradaxa, etc.), please discuss this with your primary care physician/cardiologist and your pain physician. These medications MUST be discontinued before you can have an injection safely, without the risk of uncontrolled bleeding. If these medications are not discontinued for an appropriate period of time, you will not be able to receivean injection. Please adhere to instructions given to you about when to restart your blood thinning medication. If you have any questions please reach out to our team.    If you are taking Coumadin, please have your INR checked the morning of your procedure and bring the result to your appointment unless otherwise instructed. If your INR is over 1.2, your injection may need to be rescheduled to avoid uncontrolled bleeding from the needle placement.     Call [facility]  and ask for Pain Management at [phone] between 8am-4pm Monday - Friday if you are experiencing the following:    If you received an epidural or spinal injection:    -Headache that doesnot go away  with medicine, is worse when sitting or standing up, and is greatly relieved upon lying down.   -Severe pain worse than or different than your baseline pain.   -Chills or fever (101º F or greater).   -Drainage or signs of infection at the injection site     Go directly to the Emergency Department if you are experiencing the following and received an epidural or spinal injection:   -Abrupt weakness or progressive weakness in your legs that starts after you leave the clinic.   -Abrupt severe or worsening numbness in your legs.   -Inability to urinate after the injection or loss of bowel or bladder control without the urge to defecate or urinate.     If you have a clinical question that cannot wait until your next appointment, please call [phone] between 8am-4pm Monday - Friday or send a [employer] message. We do our best to return all non-emergency messages within 24 hours, Monday - Friday. A nurse or physician will return your message. You may also try calling and they will do their best to answer your question(s):  - Dr. Matt Martinez's nurse ([phone])  - Dr. Donell Guevara/Dr. Mahmood's nurse ([phone])  - Dr. Virginia Ring/Chandra's nurse ([phone])     If you need to cancel an appointment, please call the scheduling staff at [phone] during normal business hours or leave a message at least 24 hours in advance.     If you are going to be sedated for your next procedure, you MUST have responsible adult who can legally drive accompany you home. You cannot eat or drink for at least eight hours prior to the planned procedure if you are going to receive sedation. You may take your non-blood thinning medications with a small sip of water.

## 2025-04-03 ENCOUNTER — HOSPITAL ENCOUNTER (OUTPATIENT)
Facility: HOSPITAL | Age: 65
Discharge: HOME | End: 2025-04-03
Payer: MEDICARE

## 2025-04-03 VITALS
WEIGHT: 135 LBS | DIASTOLIC BLOOD PRESSURE: 78 MMHG | HEIGHT: 66 IN | BODY MASS INDEX: 21.69 KG/M2 | OXYGEN SATURATION: 99 % | HEART RATE: 60 BPM | SYSTOLIC BLOOD PRESSURE: 132 MMHG | TEMPERATURE: 97.7 F | RESPIRATION RATE: 15 BRPM

## 2025-04-03 DIAGNOSIS — M46.1 SACROILIITIS (CMS-HCC): ICD-10-CM

## 2025-04-03 PROCEDURE — 2720000007 HC OR 272 NO HCPCS

## 2025-04-03 PROCEDURE — 64625 RF ABLTJ NRV NRVTG SI JT: CPT | Performed by: PAIN MEDICINE

## 2025-04-03 PROCEDURE — 2500000004 HC RX 250 GENERAL PHARMACY W/ HCPCS (ALT 636 FOR OP/ED): Mod: JZ | Performed by: PAIN MEDICINE

## 2025-04-03 PROCEDURE — 3700000012 HC SEDATION LEVEL 5+ TIME - INITIAL 15 MINUTES 5/> YEARS

## 2025-04-03 PROCEDURE — 99152 MOD SED SAME PHYS/QHP 5/>YRS: CPT | Performed by: PAIN MEDICINE

## 2025-04-03 RX ORDER — MIDAZOLAM HYDROCHLORIDE 1 MG/ML
INJECTION, SOLUTION INTRAMUSCULAR; INTRAVENOUS
Status: COMPLETED | OUTPATIENT
Start: 2025-04-03 | End: 2025-04-03

## 2025-04-03 RX ORDER — LIDOCAINE HYDROCHLORIDE 20 MG/ML
INJECTION, SOLUTION EPIDURAL; INFILTRATION; INTRACAUDAL; PERINEURAL
Status: COMPLETED | OUTPATIENT
Start: 2025-04-03 | End: 2025-04-03

## 2025-04-03 RX ORDER — MELOXICAM 7.5 MG/1
TABLET ORAL
COMMUNITY
Start: 2025-04-02

## 2025-04-03 RX ORDER — FENTANYL CITRATE 50 UG/ML
INJECTION, SOLUTION INTRAMUSCULAR; INTRAVENOUS
Status: COMPLETED | OUTPATIENT
Start: 2025-04-03 | End: 2025-04-03

## 2025-04-03 RX ORDER — METHYLPREDNISOLONE ACETATE 40 MG/ML
INJECTION, SUSPENSION INTRA-ARTICULAR; INTRALESIONAL; INTRAMUSCULAR; SOFT TISSUE
Status: COMPLETED | OUTPATIENT
Start: 2025-04-03 | End: 2025-04-03

## 2025-04-03 RX ORDER — BUPIVACAINE HYDROCHLORIDE 5 MG/ML
INJECTION, SOLUTION EPIDURAL; INTRACAUDAL; PERINEURAL
Status: COMPLETED | OUTPATIENT
Start: 2025-04-03 | End: 2025-04-03

## 2025-04-03 RX ADMIN — FENTANYL CITRATE 50 MCG: 50 INJECTION INTRAMUSCULAR; INTRAVENOUS at 10:22

## 2025-04-03 RX ADMIN — MIDAZOLAM 1 MG: 1 INJECTION INTRAMUSCULAR; INTRAVENOUS at 10:23

## 2025-04-03 RX ADMIN — METHYLPREDNISOLONE ACETATE 40 MG: 40 INJECTION, SUSPENSION INTRA-ARTICULAR; INTRALESIONAL; INTRAMUSCULAR; SOFT TISSUE at 10:37

## 2025-04-03 RX ADMIN — MIDAZOLAM 1 MG: 1 INJECTION INTRAMUSCULAR; INTRAVENOUS at 10:21

## 2025-04-03 RX ADMIN — LIDOCAINE HYDROCHLORIDE 7 ML: 20 INJECTION, SOLUTION EPIDURAL; INFILTRATION; INTRACAUDAL; PERINEURAL at 10:37

## 2025-04-03 RX ADMIN — BUPIVACAINE HYDROCHLORIDE 9 ML: 5 INJECTION, SOLUTION EPIDURAL; INTRACAUDAL; PERINEURAL at 10:37

## 2025-04-03 RX ADMIN — FENTANYL CITRATE 50 MCG: 50 INJECTION INTRAMUSCULAR; INTRAVENOUS at 10:24

## 2025-04-03 ASSESSMENT — PAIN - FUNCTIONAL ASSESSMENT
PAIN_FUNCTIONAL_ASSESSMENT: WONG-BAKER FACES
PAIN_FUNCTIONAL_ASSESSMENT: WONG-BAKER FACES
PAIN_FUNCTIONAL_ASSESSMENT: 0-10
PAIN_FUNCTIONAL_ASSESSMENT: WONG-BAKER FACES
PAIN_FUNCTIONAL_ASSESSMENT: 0-10
PAIN_FUNCTIONAL_ASSESSMENT: 0-10
PAIN_FUNCTIONAL_ASSESSMENT: WONG-BAKER FACES

## 2025-04-03 ASSESSMENT — PAIN SCALES - GENERAL
PAINLEVEL_OUTOF10: 0 - NO PAIN
PAINLEVEL_OUTOF10: 5 - MODERATE PAIN

## 2025-04-03 NOTE — H&P
Pain Management H&P    History Of Present Illness  Lilli Aldridge is a 65 y.o. female presents for procedure state below. Endorses no changes in past medical history or medical health since last seen in clinic.      Past Medical History  She has a past medical history of Cervical high risk human papillomavirus (HPV) DNA test positive, Cough variant asthma (HHS-HCC) (11/25/2017), Lateral epicondylitis, unspecified elbow, Personal history of diseases of the blood and blood-forming organs and certain disorders involving the immune mechanism, Personal history of other diseases of the musculoskeletal system and connective tissue, Urge incontinence (09/16/2022), and Urinary tract infection, site not specified (09/15/2022).    She has no past medical history of Personal history of irradiation.    Surgical History  She has a past surgical history that includes Other surgical history (11/09/2017); Mouth surgery (11/09/2017); Tonsillectomy (11/09/2017); Back surgery (07/06/2016); Hysterectomy (07/06/2016); and Tubal ligation (07/06/2016).     Social History  She reports that she has never smoked. She has never been exposed to tobacco smoke. She has never used smokeless tobacco. She reports that she does not drink alcohol and does not use drugs.    Family History  Family History   Problem Relation Name Age of Onset    Other (Diabetes mellitus) Mother      Hypertension Mother      Cataracts Mother      Glaucoma Mother      Other (Diabetes mellitus) Father      Hypertension Father      Liver cancer Father      Cataracts Father      Glaucoma Father      Decreased libido Sister          Allergies  Penicillins, Cat dander, Dog dander, Erythromycin, and Statins-hmg-coa reductase inhibitors    Review of Symptoms:   Constitutional: Negative for chills, diaphoresis or fever  HENT: Negative for neck swelling  Eyes:.  Negative for eye pain  Respiratory:.  Negative for cough, shortness of breath or wheezing    Cardiovascular:.  Negative  for chest pain or palpitations  Gastrointestinal:.  Negative for abdominal pain, nausea and vomiting  Genitourinary:.  Negative for urgency  Musculoskeletal:  Positive for back pain. Positive for joint pain. Denies falls within the past 3 months.  Skin: Negative for wounds or itching   Neurological: Negative for dizziness, seizures, loss of consciousness and weakness  Endo/Heme/Allergies: Does not bruise/bleed easily  Psychiatric/Behavioral: Negative for depression. The patient does not appear anxious.      Pre-sedation Evaluation  ASA class 2  Mallampati score 2     PHYSICAL EXAM  Vitals signs reviewed  Constitutional:       General: Not in acute distress     Appearance: Normal appearance. Not ill-appearing.  HENT:     Head: Normocephalic and atraumatic  Eyes:     Conjunctiva/sclera: Conjunctivae normal  Cardiovascular:     Rate and Rhythm: Normal rate and regular rhythm  Pulmonary:     Effort: No respiratory distress  Abdominal:     Palpations: Abdomen is soft  Musculoskeletal: DIAMOND  Skin:     General: Skin is warm and dry  Neurological:     General: No focal deficit present  Psychiatric:         Mood and Affect: Mood normal         Behavior: Behavior normal     Last Recorded Vitals  There were no vitals taken for this visit.    Relevant Results  Current Outpatient Medications   Medication Instructions    albuterol 90 mcg/actuation inhaler 2 puffs, Every 6 hours PRN    albuterol 90 mcg/actuation inhaler 2 puffs, inhalation, Every 6 hours PRN    calcium carbonate-vitamin D3 1,000 mg-20 mcg (800 unit) tablet Calcium 1000 + D 1000-800 MG-UNIT Oral Tablet Take 1 tablet daily Quantity: 0 Refills: 0 Ordered: 2-Sep-2021 Miracle Velasco MD Start : 2-Sep-2021 Active    chlorpheniramine (Chlor-Trimeton) 4 mg tablet 1 tablet    flunisolide (Nasalide) 25 mcg (0.025 %) spray,non-aerosol 2 sprays, 2 times daily    loratadine (Claritin) 10 mg tablet 1 tablet, Daily    methocarbamol (ROBAXIN) 500 mg, oral, 3 times daily    topiramate  (TOPAMAX) 100 mg, oral, Nightly    topiramate (TOPAMAX) 25 mg, oral, Daily         MR lumbar spine wo IV contrast 10/09/2024    Narrative  Interpreted By:  Rc Ramos,  STUDY:  MRI of the lumbar spine without IV contrast;  10/9/2024 12:11 pm    INDICATION:  Signs/Symptoms:low back pain.    ,M54.16 Radiculopathy, lumbar region    COMPARISON:  04/21/2022 lumbar MR    ACCESSION NUMBER(S):  [id number]    ORDERING CLINICIAN:  ISAMAR JOHNS    TECHNIQUE:  Sagittal and axial STIR and T1-weighted MRI images of the lumbar  spine were acquired using a spondylolysis protocol.  No contrast was  administered.    FINDINGS:  There are 5 lumbar type vertebrae.    L4-5 is grade 1 degenerative anterolisthesis more so than noted  previously. Mild retrolisthesis at L2-3 is unchanged.    The L5-S1 disc has complete loss of height with degenerative fatty  changes in the adjacent marrow similar to the prior M R. The  remaining discs have degenerative desiccation. The vertebral body  heights are normal.    Tip of the spinal cord ends normally at L1.    T10-11 and T11-12: No stenoses are noted on the sagittal images.    T12-L1: The lateral recesses and spinal canal are mildly stenosed by  disc bulge.    L1-2: The lateral recesses are mildly stenosed by disc bulge.    L2-3: Lateral recesses are mildly to moderately stenosed by disc  protrusion and ligament thickening similar to the prior MR. The  neural foramina are mildly stenosed by facet hypertrophy.    L3-4: The lateral recesses are mildly stenosed by disc bulge, facet  hypertrophy and ligament thickening. The facet joints are moderately  arthritic. Neural foramina are mildly stenosed by facet hypertrophy.    L4-5: The lateral recesses are moderately to severely stenosed by  disc bulge, ligament thickening and facet hypertrophy along with a  2-3 mm left facet joint cyst similar to mildly worse than the prior  MR The neural foramina are severely stenosed on the left, mildly  to  moderately on the right similar to the prior MR. The facet joints are  severely arthritic with small effusions bilaterally.    L5-S1. The right neural foramen has a 3 mm nerve root sleeve cyst,  unchanged. The neural foramina are mildly to moderately stenosed by  endplate spurring and facet hypertrophy bilaterally.    No paraspinous masses or inflammatory processes are noted.    Impression  Multilevel degenerative changes are present similar to mildly worse  the prior MR    I personally reviewed the images/study and I agree with the findings  as stated. This study was interpreted at [place], [place].    MACRO:  None    Signed by: Rc Ramos 10/11/2024 2:23 PM  Dictation workstation:   [id number]     Transforaminal  Table formatting from the original result was not included.  Procedure  Transforaminal    Indication  Lumbar radiculopathy    Medications  fentaNYL PF (Sublimaze) injection 50 mcg   midazolam (Versed) injection 1 mg   (Totals for administrations occurring from 0952 to 1003 on 03/27/25)     Preprocedure  A history and physical has been performed, and patient medication   allergies have been reviewed. The patient's tolerance of previous   anesthesia has been reviewed. The risks and benefits of the procedure and   the sedation options and risks were discussed with the patient. All   questions were answered and informed consent obtained.    Details of the Procedure  Pre-Op Diagnosis:  left lumbosacral radiculopathy  Post-Procedure Diagnosis: Same as preop diagnosis  Procedure: left transforaminal epidural steroid injection using   fluoroscopic guidance at L4/5 and L5/S1  Surgeon: Jamey Coleman MD, PhD   Resident/Fellow/Other Assistant: Pedro Phelan MD    Procedure Note:     Lilli Aldridge is a 65 y.o. year old female patient with  left lumbar   radiculopathy. Here for the above procedure.      The patient was identified in the preoperative holding area  and marked   according to protocol. After informed consent was obtained, the patient   was brought to the operating room and placed in the prone position.  A   timeout was performed and consent was verified.  ASA Standard monitors   were applied. The back area was prepped and draped in the usual sterile   fashion.  Using fluoroscopic guidance, the skin and subcutaneous tissue   overlying needle trajectory were anesthetized with a total 2mL of 0.5%   lidocaine at each level using a 27g hypodermic needle.     A 22-gauge blunt tipped needle was then advanced under fluoroscopic   guidance with a infrapedicular approach at the above levels.  Injection of   Iohexol  contrast revealed appropriate spread without vascular uptake.     A total of 2 mL of 0.5% lidocaine and 5 mg dexamethasone were injected at   L4/5 level. No pain on injection.The needle was removed.  Hemostasis was   ensured.  A bandage was applied.     Subsequently, a total of 2 mL of 0.5% lidocaine and 5 mg dexamethasone   were injected at L5/S1 level. No pain on injection.The needles were   removed.  Hemostasis was ensured.  A bandage was applied.     The patient tolerated the procedure well with no apparent complications.    The patient was then transferred to recovery room in stable condition.     All injected medications used were preservative-free and not .    Anesthesia: Local and conscious sedation     Pre-procedure Pain Score: 6/10  Post-procedure pain Score:0/10    Procedure Provider  Jamey Coleman MD PhD    Procedure Location  [facility]  [address] [address] [facility] [address] [phone]    Referring Provider  Jamey Coleman MD PhD  [address] [place] [address]       1. Sacroiliitis (CMS-HCC)  [place] pain management    [place] pain management    Radiofrequency Ablation    Radiofrequency Ablation           ASSESSMENT/PLAN  Lilli Aldridge is a 65 y.o. female here for bilateral sacroiliac joint  radiofrequency ablation under fluoroscopy    Patient denies any recent antibiotic use or infections, denies any blood thinner use, and denies contrast or local anesthetic allergies     Risks, benefits, alternatives discussed. All questions answered to the best of my ability. Patient agrees to proceed.      Our plan is as follows:  - Proceed with aforementioned procedure          DO RUSS Ward Pain fellow

## 2025-04-03 NOTE — DISCHARGE INSTRUCTIONS
DISCHARGE INSTRUCTIONS FOR INJECTIONS     You underwent Left sacroiliac joint radiofrequency ablation today    After most injections, it is recommended that you relax and limit your activity for the remainder of the day unless you have been told otherwise by your pain physician.  You should not drive a car, operate machinery, or make important legal decisions unless otherwise directed by your pain physician.  You may resume your normal activity, including exercise, tomorrow.      Keep a written pain diary of how much pain relief you experienced following the injection procedure and the length of time of pain relief you experienced pain relief. Following diagnostic injections like medial branch nerve blocks, sacroiliac joint blocks, stellate ganglion injections and other blocks, it is very important you record the specific amount of pain relief you experienced immediately after the injectionand how long it lasted. Your doctor will ask you for this information at your follow up visit.     For all injections, please keep the injection site dry and inspect the site for a couple of days. You may remove the Band-Aid the day of the injection at any time.     Some discomfort, bruising or slight swelling may occur at the injection site. This is not abnormal if it occurs.  If needed you may:    -Take over the counter medication such as Tylenol or Motrin.   -Apply an ice pack for 30 minutes, 2 to 3 times a day for the first 24 hours.     You may shower today; no soaking baths, hot tubs, whirlpools or swimming pools for two days.      If you are given steroids in your injection, it may take 3-5 days for the steroid medication to take effect. You may notice a worsening of your symptoms for 1-2 days after the injection. This is not abnormal.  You may use acetaminophen, ibuprofen, or prescription medication that your doctor may have prescribed for you if you need to do so.     A few common side effects of steroids include facial  flushing, sweating, restlessness, irritability,difficulty sleeping, increase in blood sugar, and increased blood pressure. If you have diabetes, please monitor your blood sugar at least once a day for at least 5 days. If you have poorly controlled high blood pressure, monitoryour blood pressure for at least 2 days and contact your primary care physician if these numbers are unusually high for you.      If you take aspirin or non-steroidal anti-inflammatory drugs (examples are Motrin, Advil, ibuprofen, Naprosyn, Voltaren, Relafen, etc.) you may restart these this evening, but stop taking it 3 days before your next appointment, unless instructed otherwiseby your physician.      You do not need to discontinue non-aspirin-containing pain medications prior to an injection (examples: Celebrex, tramadol, hydrocodone and acetaminophen).      If you take a blood thinning medication (Coumadin, Lovenox, Fragmin,Ticlid, Plavix, Pradaxa, etc.), please discuss this with your primary care physician/cardiologist and your pain physician. These medications MUST be discontinued before you can have an injection safely, without the risk of uncontrolled bleeding. If these medications are not discontinued for an appropriate period of time, you will not be able to receivean injection. Please adhere to instructions given to you about when to restart your blood thinning medication. If you have any questions please reach out to our team.    If you are taking Coumadin, please have your INR checked the morning of your procedure and bring the result to your appointment unless otherwise instructed. If your INR is over 1.2, your injection may need to be rescheduled to avoid uncontrolled bleeding from the needle placement.     Call [facility]  and ask for Pain Management at [phone] between 8am-4pm Monday - Friday if you are experiencing the following:    If you received an epidural or spinal injection:    -Headache that doesnot go away with  medicine, is worse when sitting or standing up, and is greatly relieved upon lying down.   -Severe pain worse than or different than your baseline pain.   -Chills or fever (101º F or greater).   -Drainage or signs of infection at the injection site     Go directly to the Emergency Department if you are experiencing the following and received an epidural or spinal injection:   -Abrupt weakness or progressive weakness in your legs that starts after you leave the clinic.   -Abrupt severe or worsening numbness in your legs.   -Inability to urinate after the injection or loss of bowel or bladder control without the urge to defecate or urinate.     If you have a clinical question that cannot wait until your next appointment, please call [phone] between 8am-4pm Monday - Friday or send a [place] message. We do our best to return all non-emergency messages within 24 hours, Monday - Friday. A nurse or physician will return your message. You may also try calling Dr. Chandra Rign/[place]'s nurse ([phone]) and they will do their best to answer your question(s).    If you need to cancel an appointment, please call the scheduling staff at [phone] during normal business hours or leave a message at least 24 hours in advance.     If you are going to be sedated for your next procedure, you MUST have responsible adult who can legally drive accompany you home. You cannot eat or drink for at least eight hours prior to the planned procedure if you are going to receive sedation. You may take your non-blood thinning medications with a small sip of water.

## 2025-04-07 ENCOUNTER — HOSPITAL ENCOUNTER (OUTPATIENT)
Dept: RADIOLOGY | Facility: HOSPITAL | Age: 65
Discharge: HOME | End: 2025-04-07
Payer: MEDICARE

## 2025-04-07 DIAGNOSIS — K56.50: ICD-10-CM

## 2025-04-07 PROCEDURE — 74248 X-RAY SM INT F-THRU STD: CPT | Performed by: RADIOLOGY

## 2025-04-07 PROCEDURE — 74248 X-RAY SM INT F-THRU STD: CPT

## 2025-04-07 PROCEDURE — 74240 X-RAY XM UPR GI TRC 1CNTRST: CPT | Performed by: RADIOLOGY

## 2025-04-07 PROCEDURE — 2550000001 HC RX 255 CONTRASTS: Performed by: INTERNAL MEDICINE

## 2025-04-07 RX ORDER — DIATRIZOATE MEGLUMINE AND DIATRIZOATE SODIUM 660; 100 MG/ML; MG/ML
240 SOLUTION ORAL; RECTAL ONCE
Status: COMPLETED | OUTPATIENT
Start: 2025-04-07 | End: 2025-04-07

## 2025-04-07 RX ADMIN — DIATRIZOATE MEGLUMINE AND DIATRIZOATE SODIUM 240 ML: 660; 100 LIQUID ORAL; RECTAL at 13:27

## 2025-04-10 ENCOUNTER — APPOINTMENT (OUTPATIENT)
Dept: GASTROENTEROLOGY | Facility: CLINIC | Age: 65
End: 2025-04-10
Payer: COMMERCIAL

## 2025-04-10 DIAGNOSIS — K58.0 IRRITABLE BOWEL SYNDROME WITH DIARRHEA: Primary | ICD-10-CM

## 2025-04-10 DIAGNOSIS — R10.84 GENERALIZED ABDOMINAL PAIN: ICD-10-CM

## 2025-04-10 DIAGNOSIS — R19.7 DIARRHEA, UNSPECIFIED TYPE: ICD-10-CM

## 2025-04-10 PROCEDURE — 99214 OFFICE O/P EST MOD 30 MIN: CPT | Performed by: INTERNAL MEDICINE

## 2025-04-10 PROCEDURE — 1160F RVW MEDS BY RX/DR IN RCRD: CPT | Performed by: INTERNAL MEDICINE

## 2025-04-10 PROCEDURE — 1159F MED LIST DOCD IN RCRD: CPT | Performed by: INTERNAL MEDICINE

## 2025-04-10 RX ORDER — DESIPRAMINE HYDROCHLORIDE 10 MG/1
10 TABLET ORAL NIGHTLY
Qty: 30 TABLET | Refills: 2 | Status: SHIPPED | OUTPATIENT
Start: 2025-04-10 | End: 2025-07-09

## 2025-04-10 NOTE — PROGRESS NOTES
REASON FOR VISIT: Abdominal burning and diarrhea    HPI:  Lilli Aldridge is a 65 y.o. female here to follow-up regarding symptoms of abdominal discomfort.  Had been experiencing abdominal burning sensation.  CT abdomen did not show any inflammatory findings.  At last visit we discussed possible underlying adhesions.  I placed her on trial of dicyclomine to see if that helps with her pain and reports did not improve.  Recently had also an upper GI with small bowel follow-through with normal findings.  Describes intermittent diarrhea symptoms at this point.  No rectal bleeding.  Last colonoscopy about 2 years ago.        PRIOR ENDOSCOPY    PAST MEDICAL HISTORY  Past Medical History:   Diagnosis Date    Cervical high risk human papillomavirus (HPV) DNA test positive     Cervical high risk HPV (human papillomavirus) test positive    Cough variant asthma ([facility]) 11/25/2017    Cough variant asthma    Lateral epicondylitis, unspecified elbow     Lateral epicondylitis (tennis elbow)    Personal history of diseases of the blood and blood-forming organs and certain disorders involving the immune mechanism     History of neutropenia    Personal history of other diseases of the musculoskeletal system and connective tissue     History of scoliosis    Urge incontinence 09/16/2022    Urge incontinence    Urinary tract infection, site not specified 09/15/2022    Recurrent UTI       PAST SURGICAL HISTORY  Past Surgical History:   Procedure Laterality Date    BACK SURGERY  07/06/2016    Back Surgery    HYSTERECTOMY  07/06/2016    Hysterectomy    MOUTH SURGERY  11/09/2017    Oral Surgery Tooth Extraction    OTHER SURGICAL HISTORY  11/09/2017    Cervical Conization By Cold Knife    TONSILLECTOMY  11/09/2017    Tonsillectomy    TUBAL LIGATION  07/06/2016    Tubal Ligation       FAMILY HISTORY  Family History   Problem Relation Name Age of Onset    Other (Diabetes mellitus) Mother      Hypertension Mother      Cataracts Mother       Glaucoma Mother      Other (Diabetes mellitus) Father      Hypertension Father      Liver cancer Father      Cataracts Father      Glaucoma Father      Decreased libido Sister         SOCIAL HISTORY  Social History     Tobacco Use    Smoking status: Never     Passive exposure: Never    Smokeless tobacco: Never   Substance Use Topics    Alcohol use: Never       REVIEW OF SYSTEMS  CONSTITUTIONAL: negative for fever, chills, fatigue, or unintentional weight loss,   HEENT: negative for icteric sclera, eye pain/redness, or changes in vision/hearing  RESPIRATORY: negative for cough, hemoptysis, wheezing, orthopnea, or dyspnea on exertion  CARDIOVASCULAR: negative for chest pain, palpitations, or syncope   GASTROINTESTINAL: as noted per HPI  GENITOURINARY: negative for dysuria, polyuria, incontinence, or hematuria  MUSCULOSKELETAL: negative for arthralgia, myalgia, or joint swelling/stiffness   INTEGUMENTARY/SKIN: negative jaundice, rash, or skin lesion  HEMATOLOGIC/LYMPHATIC: negative for prolonged bleeding, easy bruising, or swollen lymph nodes  ENDOCRINE: negative for cold/heat intolerance, polydipsia, polyuria, or goiter  NEUROLOGIC: negative for headaches, dizziness, tremor, or gait abnormality  PSYCHIATRIC: negative for anxiety, depression, personality changes, or sleep disturbances      A 10 point review of systems was completed and was otherwise negative.    ALLERGIES  Allergies   Allergen Reactions    Penicillins Hives and Unknown     Nausea    Cat Dander Unknown    Dog Dander Unknown    Erythromycin Other, Nausea And Vomiting, Nausea Only and Nausea/vomiting    Statins-Hmg-Coa Reductase Inhibitors Other     Muscle tightness and pain       MEDICATIONS  Current Outpatient Medications   Medication Sig Dispense Refill    albuterol 90 mcg/actuation inhaler Inhale 2 puffs every 6 hours if needed.      albuterol 90 mcg/actuation inhaler Inhale 2 puffs every 6 hours if needed for wheezing. 18 g 0    calcium  carbonate-vitamin D3 1,000 mg-20 mcg (800 unit) tablet Calcium 1000 + D 1000-800 MG-UNIT Oral Tablet Take 1 tablet daily Quantity: 0 Refills: 0 Ordered: 2-Sep-2021 Jillian Velasco MDia Start : 2-Sep-2021 Active      desipramine (Norpramin) 10 mg tablet Take 1 tablet (10 mg) by mouth once daily at bedtime. 30 tablet 2    flunisolide (Nasalide) 25 mcg (0.025 %) spray,non-aerosol Administer 2 sprays into each nostril 2 times a day.      loratadine (Claritin) 10 mg tablet Take 1 tablet (10 mg) by mouth once daily.      meloxicam (Mobic) 7.5 mg tablet       methocarbamol (Robaxin) 500 mg tablet Take 1 tablet (500 mg) by mouth 3 times a day for 3 days. 9 tablet 0    topiramate (Topamax) 100 mg tablet Take 1 tablet (100 mg) by mouth once daily at bedtime. 90 tablet 1    topiramate (Topamax) 25 mg tablet Take 1 tablet (25 mg) by mouth once daily. 90 tablet 1     No current facility-administered medications for this visit.       VITALS  There were no vitals taken for this visit.     PHYSICAL EXAM  Alert oriented in no acute distress    ASSESSMENT/ PLAN  Patient with diarrhea and intermittent abdominal pain.  Trial of dicyclomine without improvement.  Possible abdominal adhesions given her history.  We did discuss possible visceral hypersensitivity with diarrhea-predominant IBS.  I will place her on trial of low-dose desipramine 10 mg daily to see if that helps her symptoms.  There has been no indication or finding of inflammatory process based on CT imaging as well as small bowel follow-through.  She is in agreement with the plan will see me back in 6 to 8 weeks.        Signature: Yared Merritt MD    Date: 4/10/2025  Time: 2:55 PM

## 2025-04-26 DIAGNOSIS — M54.16 LUMBAR RADICULOPATHY: ICD-10-CM

## 2025-04-26 DIAGNOSIS — M54.42 LOW BACK PAIN WITH LEFT-SIDED SCIATICA, UNSPECIFIED BACK PAIN LATERALITY, UNSPECIFIED CHRONICITY: ICD-10-CM

## 2025-04-26 RX ORDER — TOPIRAMATE 25 MG/1
25 TABLET ORAL DAILY
Qty: 90 TABLET | Refills: 1 | Status: SHIPPED | OUTPATIENT
Start: 2025-04-26

## 2025-04-28 DIAGNOSIS — M54.42 LOW BACK PAIN WITH LEFT-SIDED SCIATICA, UNSPECIFIED BACK PAIN LATERALITY, UNSPECIFIED CHRONICITY: Primary | ICD-10-CM

## 2025-04-28 RX ORDER — METHYLPREDNISOLONE 4 MG/1
TABLET ORAL
Qty: 21 TABLET | Refills: 0 | Status: SHIPPED | OUTPATIENT
Start: 2025-04-28 | End: 2025-05-04

## 2025-05-08 ENCOUNTER — HOSPITAL ENCOUNTER (OUTPATIENT)
Dept: RADIOLOGY | Facility: CLINIC | Age: 65
Discharge: HOME | End: 2025-05-08
Payer: MEDICARE

## 2025-05-08 ENCOUNTER — OFFICE VISIT (OUTPATIENT)
Dept: ORTHOPEDIC SURGERY | Facility: CLINIC | Age: 65
End: 2025-05-08
Payer: MEDICARE

## 2025-05-08 DIAGNOSIS — S32.10XA CLOSED FRACTURE OF SACRUM, UNSPECIFIED PORTION OF SACRUM, INITIAL ENCOUNTER (MULTI): ICD-10-CM

## 2025-05-08 DIAGNOSIS — S09.90XA INJURY OF HEAD, INITIAL ENCOUNTER: ICD-10-CM

## 2025-05-08 DIAGNOSIS — M53.3 PAIN IN SACRUM: ICD-10-CM

## 2025-05-08 PROCEDURE — 99213 OFFICE O/P EST LOW 20 MIN: CPT | Mod: 57 | Performed by: PHYSICIAN ASSISTANT

## 2025-05-08 PROCEDURE — 72190 X-RAY EXAM OF PELVIS: CPT

## 2025-05-08 NOTE — PROGRESS NOTES
Patient is a 65 y.o. female who is 10 weeks s/p closed treatment sacral fracture.  Date of injury was 2/18/2025.  Patient continues WBAT yaya LE without assistive device at this time and states she has hx of L5-S1 discectomy, so has been healing from that surgery and participating in PT sessions. She reports the soreness in her sacrum is much better, only occasional discomfort.  Patient denies fever or chills, N/T or calf pain.    ROS: All other systems have been reviewed and are negative except as previously noted in history of present illness.     Gen: The patient is alert and oriented ×3, is in no acute distress, and appear their stated age and weight.  Psychiatric: Mood and affect are appropriate.  Eyes: Sclera are white, and pupils are round and symmetric.  ENT: Mucous membranes are moist.   Neck: Supple. Thyroid is midline.  Respiratory: Respirations are nonlabored, chest rise is symmetric.  Cardiac: Rate is regular by palpation of distal pulses.   Abdomen: Nondistended.  Integument: No obvious cutaneous lesions are noted. No signs of lymphangitis. No signs of systemic edema.    Musculoskeletal: pelvis/BLE  Decreased tenderness to sacrum, improved since initial injury  compartments soft  no calf tenderness  sensation intact to light touch  motor intact including TA/GS/EHL  palpable DP/PT pulses 2+   Strength 4+/5    X-ray: Images of pelvis with inlet and outlet views reviewed personally by me today and reveal maintenance of alignment of sacral fracture with callus and signs of healing.    IMP:  Problem List Items Addressed This Visit    None  Visit Diagnoses         Closed fracture of sacrum, unspecified portion of sacrum, initial encounter (Multi)          Injury of head, initial encounter                PLAN:  The images were reviewed and discussed with patient.  The alignment of the fracture at this time is acceptable for nonoperative management and this was explained to the patient.  She may continue WBAT  SCOTTY KAT and she declined outpatient PT sessions as she states she just finished PT sessions for her low back and has been performing her HEP daily. I will see patient back in 6 months and I need x-rays pelvis 3 views (AP with inlet & outlet) next visit. All questions were answered today.

## 2025-05-22 ENCOUNTER — APPOINTMENT (OUTPATIENT)
Dept: GASTROENTEROLOGY | Facility: CLINIC | Age: 65
End: 2025-05-22
Payer: MEDICARE

## 2025-06-09 NOTE — PROGRESS NOTES
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 Physical exam is reassuring for lack of neurological compromise. Symptoms and physical exam findings in this complex patient and her of unclear etiology at the moment, but most likely a result of sacroiliac joint dysfunction and reactive myofascial pain/tension, possibly stemming from right knee osteoarthritis. However, lumbar radiculopathy and spondylosis are on the differential, as well as referral from ovarian distention into the posterior right knee, given her history of cervical cancer in the past.    ?  Intra-articular lateral left hip problem ?  Labral tearing      --Continue Topamax 100 mg at night and 25-50 mg in the morning if needed  -Continue Robaxin and meloxicam as needed . Try meloxicam before your walk.  -Consider other medications in the future including Lyrica, Cymbalta, nortriptyline  -Consider left knee, US guided L hip joint injection  in the future if needed  -Continue dynamic active strengthening daily home exercises  -Do not wear SIJ brace for more than 2 to 3 hours/day.  -consider cornerloc or transloc procedures with Dr. Artis versus iFuse procedure (with Dr. Matute).    -Follow-up with Dr. Coleman as needed  -OMT referral provided previously: Dr. Reji Hauser [phone], or [facility] [phone] or our resident clinic in [place] [phone]  -Consider a left hip MRI in the future  -Follow-up 3-4 months or sooner if needed    The patient expressed understanding and agreement with the assessment and plan. Patient encouraged to contact us should they have any questions, concerns, or any changes in symptoms.     Thank you for allowing me to participate in the care of your patient.      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    She was last seen here on 3/4/2025, at which point I advised her to continue Topamax 100 mg at night and try 50 mg in the morning if needed.    I advised her to continue Robaxin and meloxicam as needed. This helps. Tumeric helps too    I advised her to continue physical therapy and daily home exercises. She finished PT and is doing HEP.     I advised her to follow-up with Dr. Coleman for repeat left L4-L5 and L5-S1 MJ's  as the first ones it helped especially in the thigh.  She underwent repeat left L4-L5 and L5-S1 MJ with him on 3/27/2025 and also left sacral lateral branches S1-3 and L5-S1 RFA on 4/3/2025. After a brief flare up for which a steroid pack helped, she is now about 80-85% better.     I referred her to OMT. She forgot to do thi.    Her sacral pain from her fall on 2/18/2025 has improved.    She is still having trouble walking for more than 10-15 min at a time and this is a goal she is trying to work up to. For example walking the mall. She used to walk 30-60 min daily. Wearing the brace doesn't help. She has not tried meloxciam yet before a walk.    She rates her pain as a 3-4/10, previously 7/10.    Otherwise, there've been no changes to her medications or past medical history since the last visit.    ___________________  4/1/2020: Continue home exercises, take meloxicam as needed, Voltaren gel as needed, follow up as needed  12/7/2020: Thoracolumbar trigger point injections, resume exercises, try diclofenac instead of meloxicam and ibuprofen, try Robaxin instead of Skelaxin  1/11/2021: Start  gabapentin, continue home exercises, consider MRI, follow-up in 2 months  5/10/2021: Continue exercises, continue diclofenac and Robaxin as needed, consider other medications in the future, consider injections, avoid sitting crosslegged  8/23/2021: Continue exercises, medications as needed, consider MRIs and injections in the future  2/21/2022: Lumbar and thoracic MRI ordered, continue exercises  5/10/2022: Thoracic and lumbar MRI reviewed,, thoracic trigger point injections, referral for lumbar MJ versus facet block/RFA, continue exercises, meloxicam instead of diclofenac, continue Robaxin.  6/14/2022: Core exercises, continue medications as needed, consider repeat injections  8/29/2022: Continue medications as needed, daily home exercises focusing on active strengthening, consider repeat injections with Dr. Artis if needed  2/27/2023: Core exercises provided, continue meloxicam and Robaxin as needed, consider other medications, consider ultrasound-guided SI joint injections  6/5/2023: continue core exercises, continue meloxicam and Robaxin as needed, topamax 50 mg daily consider other medications, consider ultrasound-guided SI joint injections  10/13/2023: Left ultrasound-guided SI joint injection, bilateral thoracic trigger point injections, continue medications and exercises.  11/21/23: Continue medications, exercises, SI joint brace ordered, had a Licart provided, consider other SI joint options, follow-up for another injection in the meantime  12/5/2023: left US guided SIJ injection. Continue medications, exercises, SI joint brace ordered, had a Licart provided, consider other SI joint options  2/13/24: Prednisone taper, increase Topamax, restart meloxicam, continue exercises, do not wear brace for more than 2 to 3 hours, proceed with MRI, follow-up for tremor  3/19/24: same day visit, right upper back and neck trigger point injections, continue medications, proceed with repeat MRI, consider neck MRI,  "continue exercises, neck x-ray ordered   4/9/2024: Left upper back and neck trigger point injections, continue medications,  consider neck MRI, continue exercises, proceed with SI joint procedures with Dr. Artis   6/11/2024: Continue medications and exercises, EMG ordered for the left thigh, consider minimally invasive SI joint fusion procedures  7/24/24: follow up for LFCN with US, same as above   8/2/2024 : Ultrasound-guided lateral femoral cutaneous nerve block, continue medications and HEP, same as above  9/11/2024: Take Topamax 25 mg in the morning 100 mg at night, consider other medications, continue exercises, proceed with PT, lumbar MRI ordered, follow-up with Dr. Artis for L4 5 injection  11/27/2024: Try turmeric, left knee x-ray ordered, OMT referral provided, continue medications and exercises, do repeat injections with pain management  3/4/25: You can increase Topamax as tolerated, continue donut pillow and other medications, follow-up with Dr. Coleman repeat MJ versus SI joint RFA  6/10/2025: Try meloxicam before your walk, continue Topamax and Robaxin, consider left hip MRI and left ultrasound-guided hip joint injection, continue dynamic exercises, try OMT  _______________  As a reminder:    TIMELINE OF COMPLAINT(S):  The patient had back surgery back in 2002, she is not sure what level, may be something involving L4, and she is not sure if there was fusion. This was for \"sciatica or arthritis\", and at the time she had tingling and weakness during down her right foot. Surgery helped, and she had no issues until this past June 2019. There are x-rays in June 2018, but the patient is adamant that it only started in 2019. She said that she was doing well from 2002 until 2019. At some point in the middle, about 6 years ago, she was told that she has scoliosis, but she had no main issues.     When she started feeling the pain come back in June 2019, she had some x-rays done and was told that her \"bones were " "deteriorating\". Then a few months later in August, she fell when she missed a step as she ran down the stairs, and landed on the top of her right hand, and since then it cramps up with certain movements, like when she wipes herself. The back of her knee also started hurting in June 2019, and is worse with prolonged bending and then getting up to stand.     Also in the past month, she felt the tingling \"like warm ants tingling\" going from her lower back to her right shoulder blade. This past weekend she felt pain in both posterior shoulders and neck. The left side is fine now, but she still has a dull ache in the right posterior shoulder and neck area.    The low back is the most bothersome along with the right knee. She is also had tingling in the right foot since June .    Pain:  LOCATION- low back LSJ, L=R but alternates, L groin, bilateral posterior shoulder pain , post neck and right knee and thigh posteriorly  RADIATION- Down R upper arm to elbow. no pain below knee or elbow.  ASSOCIATED WITH- no falls  NUMBNESS/TINGLING- Yes, right foot  WEAKNESS- No  CONSTANT or INTERMITTENT- Intermittent  SEVERITY/QUANTITY- 8, sitting here now 6/10 in shoulder, but none in back  QUALITY- Dull, achy  EXACERBATED BY- Nothing  BETTER WITH- Cold or warm compress  TRIED- Naproxen doesn't help, flexeril doesn't help just sleepy. , Tylenol Arthritis takes edge off, meloxicam hasn't taken in while, medrol dose packs for respiratory issues and also helped with pain. No other meds for this    PHYSICAL THERAPY: Yes, last time was in 2002, does some HEP, no TENS  CHIROPRACTIC MANIPULATION: No  ACUPUNCTURE TREATMENTS: No  DEEP TISSUE MASSAGE THERAPY: Yes, helps temporarily  OSTEOPATHIC MANIPULATION THERAPY: No  INJECTIONS:   -left L4-L5 and L5-S1 MJ with Dr. Coleman on 3/27/2025 in November 2024 with 60% relief for several  months  - left sacral lateral branches S1-3 and L5-S1 RFA on 4/3/2025 with Dr. Coleman  - SI joint RFA also in " "April 2024, helped 80% for 6 to 7 months    EMG/NCS:   EMG 6/20/2024:  \"The results were essentially within normal limits. There is unobtainable peroneal motor response recording at the EDB muscle, with needle exam showing chronic denervation in the left EDB. This is a nonspecific and not necessarily abnormal finding in isolation especially in this age group. No electrical evidence of lumbar radiculopathy was seen. The patient describes clinical symptoms suggestive of left meralgia paresthetica which is not detectable on EMG/nerve conduction studies. Clinical correlation recommended. \"   neck x-ray was done on 3/19/2024 and it showed mild to moderate multilevel cervical spondylosis especially at C5-C7.    IMAGING: Yes,     LS xray 8/2018 L5-S1 disc space narrowing, no knee or neck imaging in our system.       [facility] pelvic MRI done on 4/4/2024:  IMPRESSION:  1.  Mild insertional tendinosis of bilateral gluteus medius tendons.  2. Mild bilateral trochanteric bursitis.  3. Mild bilateral hip osteoarthrosis.  4. Marked bilateral facet osteoarthropathy at L4-L5 and L5-S1 with  bilateral facet effusions at L4-L5. At least moderate grade bilateral  L4-5 osseous foraminal stenosis. Consider dedicated lumbar spine MRI  for further evaluation if clinically warranted.  5. Mild osteoarthrosis of the left sacroiliac joint.  6. Small amount of free fluid in the dependent pelvis, a nonspecific finding.      === 10/09/24 ===  MR LUMBAR SPINE WO CONTRAST  - Impression -  Multilevel degenerative changes are present similar to mildly worse the prior MR  especially at L4-5 where there was severe stenosis on the left and moderate on the right, severely arthritic facet joints bilaterally.  Mild to moderate neuroforaminal stenosis at L5/S1    left knee x-ray 12/2/2024 and it showed chondrocalcinosis in the lateral compartment    === 02/18/25 ===  XR PELVIS WITH INLET AND OUTLET VIEWS  - Impression -  1. Patient's known subtle nondisplaced " sacral fracture is not well  appreciated due to radiographic modality.  2. Pelvic ring is intact.  3. Lower lumbar facet arthropathy.  4. Mild bilateral hip osteoarthrosis.    FUNCTIONAL HISTORY: The patient is independent in all ADLs, mobility, and driving. The patient does not use any assistive device.    SOCIAL HISTORY:  [facility] in: [facility]  Lives with: Spouse  Occupation: Clergy  Smoking:No  Alcohol: No  Drugs: No    ____________  ROS: The patient denies any bowel or bladder incontinence/accidents, night sweats, fevers, chills, recent significant weight loss. A 14 point review of systems was reviewed with the patient and is as above and otherwise negative. ROS questionnaire positive for back pain and joint pain      Physical Exam  GEN - Alert, well-developed, well-nourished, no apparent distress  PSYCH - Cooperative, appropriate mood and affect  HEENT - NC/AT  RESP - Non-labored respirations, equal expansion  CV - well-perfused, No cyanosis or edema in extremities.   ABD- soft, ND  SKIN - No rash.    There was no pain with deep hip flexion, but there was reproduction of pain both with internal more than external rotation, positive Yolette on the left.    Previously:    Positive Yolette and Gaenslen test on the left, significant tenderness over the left SI joint and surrounding paraspinal and gluteal musculature.  Hip range of motion itself was okay and did not reproduce hip pain.    Previous BACK/SPINE - Symmetric posture, no erythema, edema, or swelling. Full lumbar range of motion without provocation of pain symptoms. There was tenderness over both SI joints significantly, as well as bilateral gluteal muscles. No significant tenderness over the greater trochanteric bursa areas. Mild tenderness over the lumbar paraspinal muscles.. Straight leg raise negative bilaterally. Sitting slump test negative bilaterally. Femoral stretch test negative bilaterally.     Previous HIPS/PELVIS - Symmetric in standing and lying  Passive hip flexion, internal rotation, and external rotation within functional normal limits bilaterally without provocation of pain symptoms. No tenderness over iliopsoas tendon.positive FABERs on the left, positive Gaenslen's on the right. Negative hip clicking. Negative hip impingement test. Negative log roll. Negative resisted active SLR. No pain with deep hip flexion.patient was not able to do single leg sit to stand on either side    Previous NEURO - UE strength 5/5 - including shoulder abduction, biceps, triceps, wrist extensors, finger flexors, interossei, and    LE strength 5/5 - including hip flexors, knee flexors, knee extensors, ankle dorsiflexors, ankle plantar flexors, and EHL   Sensation - intact to light touch in bilateral lower and upper extremities.   Reflexes - diminished but equal biceps, brachioradialis, triceps, patellar and Achilles reflexes bilaterally No Clonus, No Babinski, Renee's negative bilaterally  GAIT - Normal base, normal stride length, non-antalgic. Able to toe walk and heel walk without difficulty.

## 2025-06-09 NOTE — PATIENT INSTRUCTIONS
-Continue Topamax 100 mg at night and 25-50 mg in the morning if needed  -Continue Robaxin and meloxicam as needed . Try meloxicam before your walk.  -Consider other medications in the future including Lyrica, Cymbalta, nortriptyline  -Consider left knee, US guided L hip joint injection  in the future if needed  -Continue dynamic active strengthening daily home exercises  -Do not wear SIJ brace for more than 2 to 3 hours/day.  -consider cornerloc or transloc procedures with Dr. Artis versus iFuse procedure (with Dr. Matute).    -Follow-up with Dr. Coleman as needed  -OMT referral provided previously: Dr. Reji Hauser [phone], or [facility] [phone] or our resident clinic in [place] [phone]  -Consider a left hip MRI in the future  -Follow-up 3-4 months or sooner if needed

## 2025-06-10 ENCOUNTER — APPOINTMENT (OUTPATIENT)
Dept: PHYSICAL MEDICINE AND REHAB | Facility: CLINIC | Age: 65
End: 2025-06-10
Payer: MEDICARE

## 2025-06-10 VITALS
BODY MASS INDEX: 21.69 KG/M2 | HEART RATE: 64 BPM | DIASTOLIC BLOOD PRESSURE: 84 MMHG | TEMPERATURE: 97.2 F | WEIGHT: 135 LBS | SYSTOLIC BLOOD PRESSURE: 161 MMHG | OXYGEN SATURATION: 100 % | HEIGHT: 66 IN

## 2025-06-10 DIAGNOSIS — M46.1 SACROILIITIS: ICD-10-CM

## 2025-06-10 DIAGNOSIS — G89.29 CHRONIC PAIN OF LEFT UPPER EXTREMITY: ICD-10-CM

## 2025-06-10 DIAGNOSIS — M54.6 CHRONIC THORACIC BACK PAIN, UNSPECIFIED BACK PAIN LATERALITY: ICD-10-CM

## 2025-06-10 DIAGNOSIS — M25.562 ACUTE PAIN OF LEFT KNEE: ICD-10-CM

## 2025-06-10 DIAGNOSIS — M79.602 CHRONIC PAIN OF LEFT UPPER EXTREMITY: ICD-10-CM

## 2025-06-10 DIAGNOSIS — M41.9 SCOLIOSIS, UNSPECIFIED SCOLIOSIS TYPE, UNSPECIFIED SPINAL REGION: ICD-10-CM

## 2025-06-10 DIAGNOSIS — M79.18 MYOFASCIAL PAIN: Primary | ICD-10-CM

## 2025-06-10 DIAGNOSIS — M54.42 LOW BACK PAIN WITH LEFT-SIDED SCIATICA, UNSPECIFIED BACK PAIN LATERALITY, UNSPECIFIED CHRONICITY: ICD-10-CM

## 2025-06-10 DIAGNOSIS — M53.3 SACROILIAC JOINT DYSFUNCTION OF BOTH SIDES: ICD-10-CM

## 2025-06-10 DIAGNOSIS — G89.29 CHRONIC THORACIC BACK PAIN, UNSPECIFIED BACK PAIN LATERALITY: ICD-10-CM

## 2025-06-10 DIAGNOSIS — M25.561 CHRONIC PAIN OF RIGHT KNEE: ICD-10-CM

## 2025-06-10 DIAGNOSIS — G89.29 CHRONIC PAIN OF RIGHT KNEE: ICD-10-CM

## 2025-06-10 DIAGNOSIS — M54.50 CHRONIC LOW BACK PAIN, UNSPECIFIED BACK PAIN LATERALITY, UNSPECIFIED WHETHER SCIATICA PRESENT: ICD-10-CM

## 2025-06-10 DIAGNOSIS — M54.16 LUMBAR RADICULOPATHY: ICD-10-CM

## 2025-06-10 DIAGNOSIS — G57.11 MERALGIA PARAESTHETICA, RIGHT: ICD-10-CM

## 2025-06-10 DIAGNOSIS — M54.2 NECK PAIN: ICD-10-CM

## 2025-06-10 DIAGNOSIS — G89.29 CHRONIC LOW BACK PAIN, UNSPECIFIED BACK PAIN LATERALITY, UNSPECIFIED WHETHER SCIATICA PRESENT: ICD-10-CM

## 2025-06-10 DIAGNOSIS — M46.1 INFLAMMATION OF SACROILIAC JOINT: ICD-10-CM

## 2025-06-10 DIAGNOSIS — M17.11 PRIMARY OSTEOARTHRITIS OF RIGHT KNEE: ICD-10-CM

## 2025-06-10 DIAGNOSIS — Z98.890 HISTORY OF LUMBAR SURGERY: ICD-10-CM

## 2025-06-10 PROCEDURE — 3008F BODY MASS INDEX DOCD: CPT | Performed by: PHYSICAL MEDICINE & REHABILITATION

## 2025-06-10 PROCEDURE — 1125F AMNT PAIN NOTED PAIN PRSNT: CPT | Performed by: PHYSICAL MEDICINE & REHABILITATION

## 2025-06-10 PROCEDURE — 99214 OFFICE O/P EST MOD 30 MIN: CPT | Performed by: PHYSICAL MEDICINE & REHABILITATION

## 2025-06-10 PROCEDURE — 1160F RVW MEDS BY RX/DR IN RCRD: CPT | Performed by: PHYSICAL MEDICINE & REHABILITATION

## 2025-06-10 PROCEDURE — G2211 COMPLEX E/M VISIT ADD ON: HCPCS | Performed by: PHYSICAL MEDICINE & REHABILITATION

## 2025-06-10 PROCEDURE — 1159F MED LIST DOCD IN RCRD: CPT | Performed by: PHYSICAL MEDICINE & REHABILITATION

## 2025-06-10 RX ORDER — TOPIRAMATE 25 MG/1
25-50 TABLET, FILM COATED ORAL DAILY
Qty: 180 TABLET | Refills: 1 | Status: SHIPPED | OUTPATIENT
Start: 2025-06-10 | End: 2025-12-07

## 2025-06-10 RX ORDER — METHOCARBAMOL 500 MG/1
500-1000 TABLET, FILM COATED ORAL 4 TIMES DAILY PRN
Qty: 240 TABLET | Refills: 1 | Status: SHIPPED | OUTPATIENT
Start: 2025-06-10 | End: 2025-08-09

## 2025-06-10 RX ORDER — TOPIRAMATE 100 MG/1
100 TABLET, FILM COATED ORAL NIGHTLY
Qty: 90 TABLET | Refills: 1 | Status: SHIPPED | OUTPATIENT
Start: 2025-06-10 | End: 2025-12-07

## 2025-06-10 ASSESSMENT — PAIN SCALES - GENERAL: PAINLEVEL_OUTOF10: 4

## 2025-06-11 ENCOUNTER — HOSPITAL ENCOUNTER (OUTPATIENT)
Dept: RADIOLOGY | Facility: CLINIC | Age: 65
Discharge: HOME | End: 2025-06-11
Payer: MEDICARE

## 2025-06-11 DIAGNOSIS — R09.89 OTHER SPECIFIED SYMPTOMS AND SIGNS INVOLVING THE CIRCULATORY AND RESPIRATORY SYSTEMS: ICD-10-CM

## 2025-06-11 DIAGNOSIS — R05.9 COUGH: ICD-10-CM

## 2025-06-11 PROCEDURE — 71046 X-RAY EXAM CHEST 2 VIEWS: CPT

## 2025-07-02 ENCOUNTER — APPOINTMENT (OUTPATIENT)
Dept: OBSTETRICS AND GYNECOLOGY | Facility: CLINIC | Age: 65
End: 2025-07-02
Payer: MEDICARE

## 2025-07-02 VITALS
HEIGHT: 66 IN | BODY MASS INDEX: 21.05 KG/M2 | SYSTOLIC BLOOD PRESSURE: 126 MMHG | WEIGHT: 131 LBS | DIASTOLIC BLOOD PRESSURE: 80 MMHG

## 2025-07-02 DIAGNOSIS — Z13.820 OSTEOPOROSIS SCREENING: ICD-10-CM

## 2025-07-02 DIAGNOSIS — Z01.419 ENCOUNTER FOR ANNUAL ROUTINE GYNECOLOGICAL EXAMINATION: Primary | ICD-10-CM

## 2025-07-02 DIAGNOSIS — N95.9 UNSPECIFIED MENOPAUSAL AND PERIMENOPAUSAL DISORDER: ICD-10-CM

## 2025-07-02 DIAGNOSIS — R61 NIGHT SWEATS: ICD-10-CM

## 2025-07-02 DIAGNOSIS — Z12.31 SCREENING MAMMOGRAM FOR BREAST CANCER: ICD-10-CM

## 2025-07-02 DIAGNOSIS — N95.2 VAGINAL ATROPHY: ICD-10-CM

## 2025-07-02 PROCEDURE — 99397 PER PM REEVAL EST PAT 65+ YR: CPT | Performed by: OBSTETRICS & GYNECOLOGY

## 2025-07-02 PROCEDURE — 3008F BODY MASS INDEX DOCD: CPT | Performed by: OBSTETRICS & GYNECOLOGY

## 2025-07-02 PROCEDURE — 1159F MED LIST DOCD IN RCRD: CPT | Performed by: OBSTETRICS & GYNECOLOGY

## 2025-07-02 PROCEDURE — 1126F AMNT PAIN NOTED NONE PRSNT: CPT | Performed by: OBSTETRICS & GYNECOLOGY

## 2025-07-02 ASSESSMENT — ENCOUNTER SYMPTOMS
CONSTITUTIONAL NEGATIVE: 0
CARDIOVASCULAR NEGATIVE: 0
GASTROINTESTINAL NEGATIVE: 0
HEMATOLOGIC/LYMPHATIC NEGATIVE: 0
EYES NEGATIVE: 0
PSYCHIATRIC NEGATIVE: 0
NEUROLOGICAL NEGATIVE: 0
ALLERGIC/IMMUNOLOGIC NEGATIVE: 0
RESPIRATORY NEGATIVE: 0
MUSCULOSKELETAL NEGATIVE: 0
ENDOCRINE NEGATIVE: 0

## 2025-07-02 ASSESSMENT — PAIN SCALES - GENERAL: PAINLEVEL_OUTOF10: 0-NO PAIN

## 2025-07-02 NOTE — PATIENT INSTRUCTIONS
Thanks for coming in today for your annual GYN exam.    You have had a hysterectomy and several normal Paps after.  Additional Pap smears are not needed.  However, please return to the office every 1 to 2 years for your annual GYN exam.    Arrange to have a mammogram performed once a year.    Review the information about ways to treat hot flashes/night sweats.    Follow-up with your PCP and other healthcare specialist as needed.    Feel free to call the office with any problems, questions or concerns prior to next scheduled visit.    Arrange to have a DEXA/bone density scan performed every 2 to 3 years.

## 2025-07-03 NOTE — PROGRESS NOTES
Assessment and Plan:  Lilli Aldridge is a 64 y/o woman presenting today for annual GYN exam.     Diagnoses:  #1 Routine annual gynecologic exam  #2 Beast cancer screening  #3 Osteoporosis  #4 Night sweats  #5 Menopausal disorder  #6 Vaginal atrophy    Assessment/Plan:  1. Annual GYN exam  - Additional paps not needed as patient has had several normal after her hysterectomy for cervical cancer.  - Mammogram requisition provided.  - DEXA scan ordered.  - PCP follow up PRN.    2. New onset of night sweats  - Briefly discussed with patient.  - Will await labs ordered by her PCP, hopefully an HbA1c and TSH.  - Patient should follow up with her PCP.  - Information about OTC medications as well as Veozah to treat menopause symptoms provided.  - The patient will call us back if she would like to further explore management options.    Follow up with Dr. Pozo.    [employer] Attestation  By signing my name below, I, Vincenzo Lemons, attest that this documentation has been prepared under the direction and in the presence of Adriana Pozo MD on 2025 at 12:06 AM.     HPI:   Lilli Aldridge is a 64 y/o woman presenting today for annual GYN exam. She reports night sweats that started recently. She denies abnormal discharge, vaginal bleeding, or pelvic pain.    She reports a history of pelvic pain which has improved with physical therapy.    The patient recently marked the one year anniversary of her 's passing due to a heart attack.    Past medical hx: Chronic back pain and scoliosis. Asthma.     Surgical hx: Hysterectomy for cervical cancer. Back surgery.    Social hx: No smoking, vaping, alcohol use, or drug use.    GYN hx: M13y/o. Menses stopped after hysterectomy. No hx of STIs. Not currently SA.    OB hx: .  x2.     ROS:  Review of Systems   Constitutional:         Positive for night sweats.   Genitourinary:  Negative for pelvic pain, vaginal bleeding and vaginal discharge.     Physical Exam:    Physical Exam  Constitutional:       General: She is not in acute distress.     Appearance: Normal appearance. She is normal weight.   Genitourinary:      Vulva exam comments: Normal appearing external female genitalia, normal Bartholin's and [place]'s glands bilaterally  .      Vaginal exam comments: Narrow and atrophic but fairly well supported. Consistent with postmenopausal atrophy..        Right Adnexa: not tender and no mass present.     Left Adnexa: not tender and no mass present.     Cervix is absent.      Uterus is absent.   Breasts:     Right: No inverted nipple, mass, nipple discharge or skin change.      Left: No inverted nipple, mass, nipple discharge or skin change.   HENT:      Head: Normocephalic and atraumatic.      Right Ear: External ear normal.      Left Ear: External ear normal.      Nose: Nose normal.      Mouth/Throat:      Mouth: Mucous membranes are moist.      Pharynx: Oropharynx is clear.   Eyes:      Extraocular Movements: Extraocular movements intact.      Conjunctiva/sclera: Conjunctivae normal.      Pupils: Pupils are equal, round, and reactive to light.   Neck:      Thyroid: No thyromegaly.   Cardiovascular:      Rate and Rhythm: Normal rate and regular rhythm.      Pulses: Normal pulses.      Heart sounds: Normal heart sounds.   Pulmonary:      Effort: Pulmonary effort is normal.      Breath sounds: Normal breath sounds and air entry.   Abdominal:      Palpations: Abdomen is soft.      Tenderness: There is no abdominal tenderness.   Musculoskeletal:      Cervical back: Neck supple.   Lymphadenopathy:      Upper Body:      Right upper body: No axillary adenopathy.      Left upper body: No axillary adenopathy.   Neurological:      Mental Status: She is alert and oriented to person, place, and time.      Comments: Pleasant and cooperative

## 2025-07-10 ENCOUNTER — HOSPITAL ENCOUNTER (OUTPATIENT)
Dept: RADIOLOGY | Facility: HOSPITAL | Age: 65
Discharge: HOME | End: 2025-07-10
Payer: MEDICARE

## 2025-07-10 DIAGNOSIS — N95.9 UNSPECIFIED MENOPAUSAL AND PERIMENOPAUSAL DISORDER: ICD-10-CM

## 2025-07-10 DIAGNOSIS — Z13.820 OSTEOPOROSIS SCREENING: ICD-10-CM

## 2025-07-10 PROCEDURE — 77080 DXA BONE DENSITY AXIAL: CPT

## 2025-07-28 RX ORDER — MECLIZINE HYDROCHLORIDE 25 MG/1
TABLET ORAL EVERY 24 HOURS
COMMUNITY
Start: 2025-07-07

## 2025-07-31 ENCOUNTER — RESULTS FOLLOW-UP (OUTPATIENT)
Dept: OBSTETRICS AND GYNECOLOGY | Facility: CLINIC | Age: 65
End: 2025-07-31

## 2025-07-31 ENCOUNTER — APPOINTMENT (OUTPATIENT)
Dept: GASTROENTEROLOGY | Facility: CLINIC | Age: 65
End: 2025-07-31
Payer: MEDICARE

## 2025-07-31 ENCOUNTER — TELEPHONE (OUTPATIENT)
Dept: OBSTETRICS AND GYNECOLOGY | Facility: CLINIC | Age: 65
End: 2025-07-31

## 2025-07-31 VITALS — HEIGHT: 66 IN | WEIGHT: 136 LBS | BODY MASS INDEX: 21.86 KG/M2

## 2025-07-31 DIAGNOSIS — K21.9 GASTROESOPHAGEAL REFLUX DISEASE, UNSPECIFIED WHETHER ESOPHAGITIS PRESENT: ICD-10-CM

## 2025-07-31 DIAGNOSIS — R10.33 PERIUMBILICAL ABDOMINAL PAIN: Primary | ICD-10-CM

## 2025-07-31 DIAGNOSIS — R10.13 EPIGASTRIC PAIN: ICD-10-CM

## 2025-07-31 PROCEDURE — 1036F TOBACCO NON-USER: CPT | Performed by: INTERNAL MEDICINE

## 2025-07-31 PROCEDURE — 99214 OFFICE O/P EST MOD 30 MIN: CPT | Performed by: INTERNAL MEDICINE

## 2025-07-31 PROCEDURE — 3008F BODY MASS INDEX DOCD: CPT | Performed by: INTERNAL MEDICINE

## 2025-07-31 PROCEDURE — 1159F MED LIST DOCD IN RCRD: CPT | Performed by: INTERNAL MEDICINE

## 2025-07-31 PROCEDURE — 1160F RVW MEDS BY RX/DR IN RCRD: CPT | Performed by: INTERNAL MEDICINE

## 2025-07-31 NOTE — TELEPHONE ENCOUNTER
Pt verified by name and .  Pt is aware of Dr. Pozo's message:  Osteopenia - the patient should make sure she is getting enough calcium and vitamin D in her diet and she should engage in weight bearing activity. She may want to consider medical management.   Pt states she has been taking calcium and Vit D for over 10 years.  Pt advised to make virtual appt with Dr. Pozo to discuss options.  Pt given number to schedule appt.

## 2025-07-31 NOTE — PROGRESS NOTES
"REASON FOR VISIT: Follow-up abdominal pain    HPI:  Lilli Aldridge is a 65 y.o. female here to follow-up on epigastric pain/dyspepsia.  CT imaging had been negative for inflammatory process.  Small bowel follow-through normal.  Continuing to have burning and also intermittent burning into the chest.  Dyspepsia intermittently.  Also periumbilical discomfort that is intermittent.  No rectal bleeding or melena.  Intermittently does take meloxicam.          PRIOR ENDOSCOPY    PAST MEDICAL HISTORY  Medical History[1]    PAST SURGICAL HISTORY  Surgical History[2]    FAMILY HISTORY  Family History[3]    SOCIAL HISTORY  Social History     Tobacco Use    Smoking status: Never     Passive exposure: Never    Smokeless tobacco: Never   Substance Use Topics    Alcohol use: Never       REVIEW OF SYSTEMS  CONSTITUTIONAL: negative for fever, chills, fatigue, or unintentional weight loss,   HEENT: negative for icteric sclera, eye pain/redness, or changes in vision/hearing  RESPIRATORY: negative for cough, hemoptysis, wheezing, orthopnea, or dyspnea on exertion  CARDIOVASCULAR: negative for chest pain, palpitations, or syncope   GASTROINTESTINAL: as noted per HPI  GENITOURINARY: negative for dysuria, polyuria, incontinence, or hematuria  MUSCULOSKELETAL: negative for arthralgia, myalgia, or joint swelling/stiffness   INTEGUMENTARY/SKIN: negative jaundice, rash, or skin lesion  HEMATOLOGIC/LYMPHATIC: negative for prolonged bleeding, easy bruising, or swollen lymph nodes  ENDOCRINE: negative for cold/heat intolerance, polydipsia, polyuria, or goiter  NEUROLOGIC: negative for headaches, dizziness, tremor, or gait abnormality  PSYCHIATRIC: negative for anxiety, depression, personality changes, or sleep disturbances      A 10 point review of systems was completed and was otherwise negative.    ALLERGIES  Allergies[4]    MEDICATIONS  Current Medications[5]    VITALS  Ht 1.676 m (5' 6\")   Wt 61.7 kg (136 lb)   BMI 21.95 kg/m²  "     PHYSICAL EXAM  Alert oriented in no acute distress  Epigastric tenderness to palpation, no rebound tenderness, abdomen is soft    ASSESSMENT/ PLAN  Patient with dyspepsia, epigastric burning and periumbilical burning as well as intermittent heartburn symptoms.  History of intermittent NSAID use with meloxicam.  Given persistent symptoms recommended upper endoscopy to out underlying peptic ulcer disease or H. pylori gastritis.  CT imaging without inflammatory process.  She is in agreement with the plan will follow-up with me at upper endoscopy.        Signature: Yared Merritt MD    Date: 7/31/2025  Time: 2:44 PM         [1]   Past Medical History:  Diagnosis Date    Asthma     Cervical high risk human papillomavirus (HPV) DNA test positive     Cervical high risk HPV (human papillomavirus) test positive    Chronic back pain     Cough variant asthma (HHS-HCC) 11/25/2017    Cough variant asthma    Lateral epicondylitis, unspecified elbow     Lateral epicondylitis (tennis elbow)    Personal history of diseases of the blood and blood-forming organs and certain disorders involving the immune mechanism     History of neutropenia    Personal history of other diseases of the musculoskeletal system and connective tissue     History of scoliosis    Scoliosis     Urge incontinence 09/16/2022    Urge incontinence    Urinary tract infection, site not specified 09/15/2022    Recurrent UTI   [2]   Past Surgical History:  Procedure Laterality Date    BACK SURGERY  07/06/2016    Back Surgery    HYSTERECTOMY  07/06/2016    Hysterectomy    MOUTH SURGERY  11/09/2017    Oral Surgery Tooth Extraction    OTHER SURGICAL HISTORY  11/09/2017    Cervical Conization By Cold Knife    TONSILLECTOMY  11/09/2017    Tonsillectomy    TUBAL LIGATION  07/06/2016    Tubal Ligation   [3]   Family History  Problem Relation Name Age of Onset    Other (Diabetes mellitus) Mother      Hypertension Mother      Cataracts Mother      Glaucoma Mother      Other  (Diabetes mellitus) Father      Hypertension Father      Liver cancer Father      Cataracts Father      Glaucoma Father      Decreased libido Sister     [4]   Allergies  Allergen Reactions    Penicillins Hives and Unknown     Nausea    Cat Dander Unknown    Dog Dander Unknown    Erythromycin Other, Nausea And Vomiting, Nausea Only and Nausea/vomiting    Statins-Hmg-Coa Reductase Inhibitors Other     Muscle tightness and pain   [5]   Current Outpatient Medications   Medication Sig Dispense Refill    albuterol 90 mcg/actuation inhaler Inhale 2 puffs every 6 hours if needed for wheezing. 18 g 0    calcium carbonate-vitamin D3 1,000 mg-20 mcg (800 unit) tablet Calcium 1000 + D 1000-800 MG-UNIT Oral Tablet Take 1 tablet daily Quantity: 0 Refills: 0 Ordered: 2-Sep-2021 Miracle Velasco MD Start : 2-Sep-2021 Active      flunisolide (Nasalide) 25 mcg (0.025 %) spray,non-aerosol Administer 2 sprays into each nostril 2 times a day.      loratadine (Claritin) 10 mg tablet Take 1 tablet (10 mg) by mouth once daily.      meclizine (Antivert) 25 mg tablet once every 24 hours.      meloxicam (Mobic) 7.5 mg tablet       methocarbamol (Robaxin) 500 mg tablet Take 1-2 tablets (500-1,000 mg) by mouth 4 times a day as needed for muscle spasms. 240 tablet 1    topiramate (Topamax) 100 mg tablet Take 1 tablet (100 mg) by mouth once daily at bedtime. In addition to 25-50 mg in the morning 90 tablet 1    topiramate (Topamax) 25 mg tablet Take 1-2 tablets (25-50 mg) by mouth once daily. In addition to the 100 mg at night 180 tablet 1    desipramine (Norpramin) 10 mg tablet Take 1 tablet (10 mg) by mouth once daily at bedtime. (Patient not taking: Reported on 7/31/2025) 30 tablet 2     No current facility-administered medications for this visit.

## 2025-08-04 ENCOUNTER — OFFICE VISIT (OUTPATIENT)
Dept: URGENT CARE | Age: 65
End: 2025-08-04
Payer: MEDICARE

## 2025-08-04 VITALS
SYSTOLIC BLOOD PRESSURE: 158 MMHG | OXYGEN SATURATION: 98 % | RESPIRATION RATE: 19 BRPM | TEMPERATURE: 97.7 F | HEART RATE: 77 BPM | DIASTOLIC BLOOD PRESSURE: 90 MMHG

## 2025-08-04 DIAGNOSIS — B96.89 ACUTE BACTERIAL SINUSITIS: Primary | ICD-10-CM

## 2025-08-04 DIAGNOSIS — J01.90 ACUTE BACTERIAL SINUSITIS: Primary | ICD-10-CM

## 2025-08-04 DIAGNOSIS — H10.31 ACUTE BACTERIAL CONJUNCTIVITIS OF RIGHT EYE: ICD-10-CM

## 2025-08-04 PROCEDURE — 1159F MED LIST DOCD IN RCRD: CPT | Performed by: NURSE PRACTITIONER

## 2025-08-04 PROCEDURE — 1036F TOBACCO NON-USER: CPT | Performed by: NURSE PRACTITIONER

## 2025-08-04 PROCEDURE — 99213 OFFICE O/P EST LOW 20 MIN: CPT | Performed by: NURSE PRACTITIONER

## 2025-08-04 PROCEDURE — 1160F RVW MEDS BY RX/DR IN RCRD: CPT | Performed by: NURSE PRACTITIONER

## 2025-08-04 RX ORDER — POLYMYXIN B SULFATE AND TRIMETHOPRIM 1; 10000 MG/ML; [USP'U]/ML
1 SOLUTION OPHTHALMIC EVERY 4 HOURS
Qty: 10 ML | Refills: 0 | Status: SHIPPED | OUTPATIENT
Start: 2025-08-04 | End: 2025-08-14

## 2025-08-04 RX ORDER — DOXYCYCLINE 100 MG/1
100 CAPSULE ORAL 2 TIMES DAILY
Qty: 20 CAPSULE | Refills: 0 | Status: SHIPPED | OUTPATIENT
Start: 2025-08-04 | End: 2025-08-14

## 2025-08-04 ASSESSMENT — ENCOUNTER SYMPTOMS
COUGH: 1
SINUS PAIN: 1
FATIGUE: 1
SINUS PRESSURE: 1
SORE THROAT: 1

## 2025-08-04 NOTE — PROGRESS NOTES
Subjective   Patient ID: Lilli Aldridge is a 65 y.o. female. They present today with a chief complaint of Cough (Green mucus and a sinus HA or one week. This morning RT eye was closed with puss in it.).    History of Present Illness  Patient is a 65-year-old female present today complaining of ongoing cough with green sputum as well as sinus headache.  Patient symptoms ongoing for a week.  Patient also noted right eye discharge since this morning.  Patient denies visual changes, denies fever, denies mental status changes.    Past Medical History  Allergies as of 08/04/2025 - Reviewed 08/04/2025   Allergen Reaction Noted    Penicillins Hives and Unknown 08/22/2011    Cat dander Unknown 11/01/2023    Dog dander Unknown 11/01/2023    Erythromycin Other, Nausea And Vomiting, Nausea Only, and Nausea/vomiting 08/22/2011    Statins-hmg-coa reductase inhibitors Other 11/07/2024       Prescriptions Prior to Admission[1]     Medical History[2]    Surgical History[3]     reports that she has never smoked. She has never been exposed to tobacco smoke. She has never used smokeless tobacco. She reports that she does not drink alcohol and does not use drugs.    Review of Systems  Review of Systems   Constitutional:  Positive for fatigue.   HENT:  Positive for congestion, sinus pressure, sinus pain and sore throat.    Respiratory:  Positive for cough.                                   Objective    Vitals:    08/04/25 1115   BP: 158/90   BP Location: Left arm   Patient Position: Sitting   Pulse: 77   Resp: 19   Temp: 36.5 °C (97.7 °F)   TempSrc: Oral   SpO2: 98%     No LMP recorded. Patient has had a hysterectomy.    Physical Exam  Vitals reviewed.   General: Vitals Noted. No distress. Normocephalic.  Cardiovascular:     Heart sounds: Normal heart sounds, S1 normal and S2 normal. No murmur heard.     No friction rub.   Pulmonary:      Effort: Pulmonary effort is normal.      Breath sounds:  Lungs clear to auscultation bilaterally    HEENT: Left and right TM is within normal limits No drainage.  EOMI, normal conjunctiva to the left eye, erythema to the right conjunctiva, patent nares, Normal OP Noted maxillary and frontal sinus tenderness on palpation is present. Pharynx and tonsils are hyperemic, and without exudate.   Neck: Supple with no adenopathy.  Lymphadenopathy:   No cervical adenopathy on palpation  Lower Body: No right inguinal adenopathy. No left inguinal adenopathy.   Abdominal:      Palpations: There is no hepatomegaly, splenomegaly or mass. Abdomen is soft, non-tender, and non-distended. No suprapubic tenderness. No CVA tenderness.   Skin:     Comments: no rash   Neurological:      Cranial Nerves: Cranial nerves 2-12 are intact.      Sensory: No sensory deficit.      Motor: Motor function is intact.      Deep Tendon Reflexes: Reflexes are normal and symmetric.       Procedures    Point of Care Test & Imaging Results from this visit  No results found for this visit on 08/04/25.   Imaging  No results found.    Cardiology, Vascular, and Other Imaging  No other imaging results found for the past 2 days      Diagnostic study results (if any) were reviewed by BILLIE Escobar.    Assessment/Plan   Allergies, medications, history, and pertinent labs/EKGs/Imaging reviewed by BILLIE Escobar.     Medical Decision Making  Patient is alert and oriented x 4, in no acute distress.  Presents with concern for sinus pressure with thick green discharge and pain as well as right eye discharge.  Upon presentation examination facial tenderness over maxillary and frontal sinuses noted on palpation.  Concern for acute bacterial sinusitis.  Will start patient on doxycycline today as patient is allergic to penicillin.  Erythema noted to the right conjunctiva with purulent discharge prior to patient cleaning the eye this morning.  Suggestive of bacterial conjunctivitis.  Will issue eyedrops with antibiotics to bilateral eyes.  Significant  sinus symptoms were discussed with the patient today warranting representation to urgent care.  Patient relates understanding and agrees with the plan.    Orders and Diagnoses  Diagnoses and all orders for this visit:  Acute bacterial sinusitis  -     doxycycline (Vibramycin) 100 mg capsule; Take 1 capsule (100 mg) by mouth 2 times a day for 10 days. Take with at least 8 ounces (large glass) of water, do not lie down for 30 minutes after  Acute bacterial conjunctivitis of right eye  -     polymyxin B sulf-trimethoprim (Polytrim) ophthalmic solution; Administer 1 drop into both eyes every 4 hours for 10 days.      Medical Admin Record      Patient disposition: Home    Electronically signed by BILLIE Escobar  5:06 PM           [1] (Not in a hospital admission)   [2]   Past Medical History:  Diagnosis Date    Asthma     Cervical high risk human papillomavirus (HPV) DNA test positive     Cervical high risk HPV (human papillomavirus) test positive    Chronic back pain     Cough variant asthma (HHS-HCC) 11/25/2017    Cough variant asthma    Lateral epicondylitis, unspecified elbow     Lateral epicondylitis (tennis elbow)    Personal history of diseases of the blood and blood-forming organs and certain disorders involving the immune mechanism     History of neutropenia    Personal history of other diseases of the musculoskeletal system and connective tissue     History of scoliosis    Scoliosis     Urge incontinence 09/16/2022    Urge incontinence    Urinary tract infection, site not specified 09/15/2022    Recurrent UTI   [3]   Past Surgical History:  Procedure Laterality Date    BACK SURGERY  07/06/2016    Back Surgery    HYSTERECTOMY  07/06/2016    Hysterectomy    MOUTH SURGERY  11/09/2017    Oral Surgery Tooth Extraction    OTHER SURGICAL HISTORY  11/09/2017    Cervical Conization By Cold Knife    TONSILLECTOMY  11/09/2017    Tonsillectomy    TUBAL LIGATION  07/06/2016    Tubal Ligation

## 2025-08-19 ENCOUNTER — APPOINTMENT (OUTPATIENT)
Dept: OBSTETRICS AND GYNECOLOGY | Facility: CLINIC | Age: 65
End: 2025-08-19
Payer: MEDICARE

## 2025-08-19 DIAGNOSIS — M85.852 OSTEOPENIA OF NECK OF LEFT FEMUR: Primary | ICD-10-CM

## 2025-08-19 PROCEDURE — 99214 OFFICE O/P EST MOD 30 MIN: CPT | Performed by: OBSTETRICS & GYNECOLOGY

## 2025-08-21 ENCOUNTER — APPOINTMENT (OUTPATIENT)
Dept: GASTROENTEROLOGY | Facility: EXTERNAL LOCATION | Age: 65
End: 2025-08-21
Payer: MEDICARE

## 2025-08-21 DIAGNOSIS — R10.13 EPIGASTRIC PAIN: Primary | ICD-10-CM

## 2025-08-21 DIAGNOSIS — K31.89 OTHER DISEASES OF STOMACH AND DUODENUM: ICD-10-CM

## 2025-08-21 DIAGNOSIS — K21.00 GASTROESOPHAGEAL REFLUX DISEASE WITH ESOPHAGITIS, UNSPECIFIED WHETHER HEMORRHAGE: ICD-10-CM

## 2025-08-21 PROCEDURE — 43239 EGD BIOPSY SINGLE/MULTIPLE: CPT | Performed by: INTERNAL MEDICINE

## 2025-10-16 ENCOUNTER — APPOINTMENT (OUTPATIENT)
Dept: PHYSICAL MEDICINE AND REHAB | Facility: CLINIC | Age: 65
End: 2025-10-16
Payer: MEDICARE

## 2026-07-09 ENCOUNTER — APPOINTMENT (OUTPATIENT)
Dept: OBSTETRICS AND GYNECOLOGY | Facility: CLINIC | Age: 66
End: 2026-07-09
Payer: MEDICARE